# Patient Record
Sex: FEMALE | Race: WHITE
[De-identification: names, ages, dates, MRNs, and addresses within clinical notes are randomized per-mention and may not be internally consistent; named-entity substitution may affect disease eponyms.]

---

## 2018-01-20 ENCOUNTER — HOSPITAL ENCOUNTER (EMERGENCY)
Dept: HOSPITAL 75 - ER | Age: 48
Discharge: HOME | End: 2018-01-20
Payer: COMMERCIAL

## 2018-01-20 VITALS — SYSTOLIC BLOOD PRESSURE: 134 MMHG | DIASTOLIC BLOOD PRESSURE: 71 MMHG

## 2018-01-20 VITALS — HEIGHT: 62 IN | WEIGHT: 156 LBS | BODY MASS INDEX: 28.71 KG/M2

## 2018-01-20 DIAGNOSIS — Z90.710: ICD-10-CM

## 2018-01-20 DIAGNOSIS — D72.829: Primary | ICD-10-CM

## 2018-01-20 DIAGNOSIS — N39.0: ICD-10-CM

## 2018-01-20 DIAGNOSIS — Z90.89: ICD-10-CM

## 2018-01-20 DIAGNOSIS — Z90.49: ICD-10-CM

## 2018-01-20 DIAGNOSIS — J02.0: ICD-10-CM

## 2018-01-20 DIAGNOSIS — J45.909: ICD-10-CM

## 2018-01-20 LAB
ALBUMIN SERPL-MCNC: 4.1 GM/DL (ref 3.2–4.5)
ALP SERPL-CCNC: 161 U/L (ref 40–136)
ALT SERPL-CCNC: 45 U/L (ref 0–55)
APTT PPP: YELLOW S
BACTERIA #/AREA URNS HPF: (no result) /HPF
BASOPHILS # BLD AUTO: 0.2 10^3/UL (ref 0–0.1)
BASOPHILS NFR BLD AUTO: 1 % (ref 0–10)
BASOPHILS NFR BLD MANUAL: 0 %
BILIRUB SERPL-MCNC: 0.5 MG/DL (ref 0.1–1)
BILIRUB UR QL STRIP: NEGATIVE
BUN/CREAT SERPL: 14
CALCIUM SERPL-MCNC: 9.6 MG/DL (ref 8.5–10.1)
CHLORIDE SERPL-SCNC: 98 MMOL/L (ref 98–107)
CO2 SERPL-SCNC: 24 MMOL/L (ref 21–32)
CREAT SERPL-MCNC: 0.73 MG/DL (ref 0.6–1.3)
EOSINOPHIL # BLD AUTO: 0.3 10^3/UL (ref 0–0.3)
EOSINOPHIL NFR BLD AUTO: 2 % (ref 0–10)
EOSINOPHIL NFR BLD MANUAL: 3 %
ERYTHROCYTE [DISTWIDTH] IN BLOOD BY AUTOMATED COUNT: 13 % (ref 10–14.5)
FIBRINOGEN PPP-MCNC: (no result) MG/DL
GFR SERPLBLD BASED ON 1.73 SQ M-ARVRAT: > 60 ML/MIN
GLUCOSE SERPL-MCNC: 133 MG/DL (ref 70–105)
GLUCOSE UR STRIP-MCNC: NEGATIVE MG/DL
HCT VFR BLD CALC: 39 % (ref 35–52)
HGB BLD-MCNC: 13.4 G/DL (ref 11.5–16)
KETONES UR QL STRIP: (no result)
LEUKOCYTE ESTERASE UR QL STRIP: (no result)
LIPASE SERPL-CCNC: 34 U/L (ref 8–78)
LYMPHOCYTES # BLD AUTO: 2.4 X 10^3 (ref 1–4)
LYMPHOCYTES NFR BLD AUTO: 11 % (ref 12–44)
MANUAL DIFFERENTIAL PERFORMED BLD QL: YES
MCH RBC QN AUTO: 30 PG (ref 25–34)
MCHC RBC AUTO-ENTMCNC: 35 G/DL (ref 32–36)
MCV RBC AUTO: 86 FL (ref 80–99)
MONOCYTES # BLD AUTO: 1.1 X 10^3 (ref 0–1)
MONOCYTES NFR BLD AUTO: 5 % (ref 0–12)
MONOCYTES NFR BLD: 1 %
NEUTROPHILS # BLD AUTO: 17.5 X 10^3 (ref 1.8–7.8)
NEUTROPHILS NFR BLD AUTO: 81 % (ref 42–75)
NEUTS BAND NFR BLD MANUAL: 80 %
NEUTS BAND NFR BLD: 1 %
NITRITE UR QL STRIP: NEGATIVE
PH UR STRIP: 5 [PH] (ref 5–9)
PLATELET # BLD: 293 10^3/UL (ref 130–400)
PMV BLD AUTO: 10.1 FL (ref 7.4–10.4)
POTASSIUM SERPL-SCNC: 3.2 MMOL/L (ref 3.6–5)
PROT SERPL-MCNC: 8.6 GM/DL (ref 6.4–8.2)
PROT UR QL STRIP: (no result)
RBC # BLD AUTO: 4.46 10^6/UL (ref 4.35–5.85)
RBC #/AREA URNS HPF: (no result) /HPF
RBC MORPH BLD: NORMAL
SODIUM SERPL-SCNC: 140 MMOL/L (ref 135–145)
SP GR UR STRIP: 1.01 (ref 1.02–1.02)
UROBILINOGEN UR-MCNC: NORMAL MG/DL
VARIANT LYMPHS NFR BLD MANUAL: 15 %
WBC # BLD AUTO: 21.5 10^3/UL (ref 4.3–11)
WBC #/AREA URNS HPF: (no result) /HPF
YEAST #/AREA URNS HPF: (no result) /HPF

## 2018-01-20 PROCEDURE — 81000 URINALYSIS NONAUTO W/SCOPE: CPT

## 2018-01-20 PROCEDURE — 84703 CHORIONIC GONADOTROPIN ASSAY: CPT

## 2018-01-20 PROCEDURE — 87430 STREP A AG IA: CPT

## 2018-01-20 PROCEDURE — 83690 ASSAY OF LIPASE: CPT

## 2018-01-20 PROCEDURE — 87088 URINE BACTERIA CULTURE: CPT

## 2018-01-20 PROCEDURE — 96374 THER/PROPH/DIAG INJ IV PUSH: CPT

## 2018-01-20 PROCEDURE — 96375 TX/PRO/DX INJ NEW DRUG ADDON: CPT

## 2018-01-20 PROCEDURE — 80053 COMPREHEN METABOLIC PANEL: CPT

## 2018-01-20 PROCEDURE — 85027 COMPLETE CBC AUTOMATED: CPT

## 2018-01-20 PROCEDURE — 36415 COLL VENOUS BLD VENIPUNCTURE: CPT

## 2018-01-20 PROCEDURE — 71046 X-RAY EXAM CHEST 2 VIEWS: CPT

## 2018-01-20 PROCEDURE — 83605 ASSAY OF LACTIC ACID: CPT

## 2018-01-20 PROCEDURE — 85007 BL SMEAR W/DIFF WBC COUNT: CPT

## 2018-01-20 PROCEDURE — 87040 BLOOD CULTURE FOR BACTERIA: CPT

## 2018-01-20 PROCEDURE — 74177 CT ABD & PELVIS W/CONTRAST: CPT

## 2018-01-20 NOTE — DIAGNOSTIC IMAGING REPORT
PROCEDURE: CT abdomen and pelvis with contrast.



TECHNIQUE: Multiple contiguous axial images were obtained through

the abdomen and pelvis after administration of intravenous

contrast. 



INDICATION:  Left side abdominal pain



Lung bases are clear. Liver appears normal. Gallbladder is

present. Pancreas normal. Spleen is not enlarged. Kidneys and

adrenals appear normal. There is diverticulosis of the colon with

no evidence of diverticulitis. Small bowel is not dilated.

Appendix appears to be surgically absent. Uterus appears to be

surgically absent. There is no intraperitoneal free air or free

fluid.



IMPRESSION: Uncomplicated diverticulosis of the colon. No acute

abnormalities seen.



Dictated by: 



  Dictated on workstation # [id number]

## 2018-01-20 NOTE — DIAGNOSTIC IMAGING REPORT
INDICATION: Left-sided chest pain



PA and lateral chest



Heart size and pulmonary vascularity are normal. Lungs are clear.

There are no effusions or pneumothoraces.



IMPRESSION: Negative chest



Dictated by: 



  Dictated on workstation # [id number]

## 2018-01-20 NOTE — ED BACK PAIN
General


Chief Complaint:  Back Problems


Stated Complaint:  BACK PAIN


Nursing Triage Note:  


LOWER BACK PAIN, NAUSEA TODAY.


Nursing Sepsis Screen:  No Definite Risk


Source of Information:  Patient


Exam Limitations:  No Limitations





History of Present Illness


Date Seen by Provider:  2018


Time Seen by Provider:  19:26


Initial Comments


To ER with mid back pain, nausea today. No fever. Slight cough. She is on 

Tamiflu, hydrocodone/chlorpheniramine, Omnicef for testing positive for strep 

throat and influenza-like symptoms. She's been on these medicines since 18 

prescribed by [facility] urgent care.


Location:  Paraspinous Muscles


Timing/Duration:  1-2 Days


Severity:  Moderate





Allergies and Home Medications


Allergies


Coded Allergies:  


     No Known Drug Allergies (Unverified , 18)





Home Medications


Cefdinir 300 Mg Capsule, (Reported)


Clonidine HCl 0.1 Mg Tablet, (Reported)


Hydrocodone/Chlorphen P-Stirex 473 Ml Ne.er.12h, (Reported)


Oseltamivir Phosphate 75 Mg Capsule, (Reported)





Constitutional:  see HPI


EENTM:  see HPI


Respiratory:  see HPI, cough


Cardiovascular:  no symptoms reported


Genitourinary:  no symptoms reported


Musculoskeletal:  no symptoms reported


Skin:  no symptoms reported


Psychiatric/Neurological:  No Symptoms Reported





Past Medical-Social-Family Hx


Patient Social History


Alcohol Use:  Rarely Uses


Recreational Drug Use:  No


Smoking Status:  Never a Smoker


2nd Hand Smoke Exposure:  No


Recent Foreign Travel:  No


Contact w/Someone Who Travel:  No


Recent Infectious Disease Expo:  No


Recent Hopitalizations:  No





Immunizations Up To Date


Tetanus Booster (TDap):  Unknown





Seasonal Allergies


Seasonal Allergies:  Yes





Surgeries


History of Surgeries:  Yes


Surgeries:  Appendectomy, Hysterectomy, Tonsillectomy





Respiratory


History of Respiratory Disorde:  Yes


Respiratory Disorders:  Asthma





Cardiovascular


History of Cardiac Disorders:  No





Neurological


History of Neurological Disord:  No





Reproductive System


Pregnant:  No


GYN History:  Hysterectomy





Genitourinary


History of Genitourinary Disor:  No





Gastrointestinal


History of Gastrointestinal Di:  No





Musculoskeletal


History of Musculoskeletal Dis:  No





Endocrine


History of Endocrine Disorders:  No





HEENT


History of HEENT Disorders:  No





Cancer


History of Cancer:  No





Psychosocial


History of Psychiatric Problem:  No





Integumentary


History of Skin or Integumenta:  No





Blood Transfusions


History of Blood Disorders:  No





Physical Exam


Vital Signs





Vital Sign - Last 12Hours








 18





 19:15


 


Temp 98.3


 


Pulse 109


 


Resp 18


 


B/P (MAP) 133/82 (99)


 


Pulse Ox 95


 


O2 Delivery Room Air





Capillary Refill : Less Than 3 Seconds


General Appearance:  No Apparent Distress, WD/WN


HEENT:  PERRL/EOMI, TMs Normal, Other (mild cobblestoning and erythema of the 

oropharynx without exudate. Tonsils are absent.)


Neck:  Full Range of Motion, Normal Inspection, Lymphadenopathy (L), 

Lymphadenopathy (R)


Respiratory:  Normal Breath Sounds, No Accessory Muscle Use, No Respiratory 

Distress


Gastrointestinal:  Non Tender, Soft


Extremity:  Normal Capillary Refill, Normal Inspection


Neurologic/Psychiatric:  Alert, Oriented x3


Skin:  Normal Color, Warm/Dry





Progress/Results/Core Measures


Results/Orders


Lab Results





Laboratory Tests








Test


  18


19:20 18


19:25 18


20:10 18


20:38 Range/Units


 


 


White Blood Count


  21.5 H


  


  


  


  4.3-11.0


10^3/uL


 


Red Blood Count


  4.46 


  


  


  


  4.35-5.85


10^6/uL


 


Hemoglobin 13.4     11.5-16.0  G/DL


 


Hematocrit 39     35-52  %


 


Mean Corpuscular Volume 86     80-99  FL


 


Mean Corpuscular Hemoglobin 30     25-34  PG


 


Mean Corpuscular Hemoglobin


Concent 35 


  


  


  


  32-36  G/DL


 


 


Red Cell Distribution Width 13.0     10.0-14.5  %


 


Platelet Count


  293 


  


  


  


  130-400


10^3/uL


 


Mean Platelet Volume 10.1     7.4-10.4  FL


 


Neutrophils (%) (Auto) 81 H    42-75  %


 


Lymphocytes (%) (Auto) 11 L    12-44  %


 


Monocytes (%) (Auto) 5     0-12  %


 


Eosinophils (%) (Auto) 2     0-10  %


 


Basophils (%) (Auto) 1     0-10  %


 


Neutrophils # (Auto) 17.5 H    1.8-7.8  X 10^3


 


Lymphocytes # (Auto) 2.4     1.0-4.0  X 10^3


 


Monocytes # (Auto) 1.1 H    0.0-1.0  X 10^3


 


Eosinophils # (Auto)


  0.3 


  


  


  


  0.0-0.3


10^3/uL


 


Basophils # (Auto)


  0.2 H


  


  


  


  0.0-0.1


10^3/uL


 


Neutrophils % (Manual) 80      %


 


Lymphocytes % (Manual) 15      %


 


Monocytes % (Manual) 1      %


 


Eosinophils % (Manual) 3      %


 


Basophils % (Manual) 0      %


 


Band Neutrophils 1      %


 


Blood Morphology Comment NORMAL      


 


Sodium Level 140     135-145  MMOL/L


 


Potassium Level 3.2 L    3.6-5.0  MMOL/L


 


Chloride Level 98       MMOL/L


 


Carbon Dioxide Level 24     21-32  MMOL/L


 


Anion Gap 18 H    5-14  MMOL/L


 


Blood Urea Nitrogen 10     7-18  MG/DL


 


Creatinine


  0.73 


  


  


  


  0.60-1.30


MG/DL


 


Estimat Glomerular Filtration


Rate > 60 


  


  


  


   


 


 


BUN/Creatinine Ratio 14      


 


Glucose Level 133 H      MG/DL


 


Calcium Level 9.6     8.5-10.1  MG/DL


 


Total Bilirubin 0.5     0.1-1.0  MG/DL


 


Aspartate Amino Transf


(AST/SGOT) 20 


  


  


  


  5-34  U/L


 


 


Alanine Aminotransferase


(ALT/SGPT) 45 


  


  


  


  0-55  U/L


 


 


Alkaline Phosphatase 161 H      U/L


 


Total Protein 8.6 H    6.4-8.2  GM/DL


 


Albumin 4.1     3.2-4.5  GM/DL


 


Lipase 34     8-78  U/L


 


Urine Color  YELLOW     


 


Urine Clarity


  


  SLIGHTLY


CLOUDY 


  


   


 


 


Urine pH  5    5-9  


 


Urine Specific Gravity  1.015 L   1.016-1.022  


 


Urine Protein  2+ H   NEGATIVE  


 


Urine Glucose (UA)  NEGATIVE    NEGATIVE  


 


Urine Ketones  3+ H   NEGATIVE  


 


Urine Nitrite  NEGATIVE    NEGATIVE  


 


Urine Bilirubin  NEGATIVE    NEGATIVE  


 


Urine Urobilinogen  NORMAL    NORMAL  MG/DL


 


Urine Leukocyte Esterase  2+ H   NEGATIVE  


 


Urine RBC (Auto)  3+ H   NEGATIVE  


 


Urine RBC  2-5 H    /HPF


 


Urine WBC  5-10 H    /HPF


 


Urine Squamous Epithelial


Cells 


  10-25 H


  


  


   /HPF


 


 


Urine Crystals  NONE     /LPF


 


Urine Bacteria  TRACE     /HPF


 


Urine Casts  NONE     /LPF


 


Urine Mucus  MODERATE H    /LPF


 


Urine Yeast  FEW H    /HPF


 


Urine Culture Indicated  YES     


 


Urine Pregnancy Test  NEGATIVE    NEGATIVE  


 


Lactic Acid Level


  


  


  1.08 


  


  0.50-2.00


MMOL/L


 


Group A Streptococcus Screen    NEGATIVE  NEGATIVE  








My Orders





Orders - JAKE HERBERT


Cbc With Automated Diff (18 19:25)


Comprehensive Metabolic Panel (18 19:25)


Ua Culture If Indicated (18 19:25)


Ketorolac Injection (Toradol Injection) (18 19:30)


Lactated Ringers (Lr 1000 Ml Iv Solution (18 19:30)


Lipase (18 19:25)


Chest Pa/Lat (2 View) (18 19:25)


Ondansetron Injection (Zofran Injectio (18 19:30)


Saline Lock/Iv-Start (18 19:34)


Manual Differential (18 19:20)


Urine Culture (18 19:25)


Ct Abdomen/Pelvis W (18 19:48)


Hcg,Qualitative Urine (18 19:49)


Iohexol Injection (Omnipaque 350 Mg/Ml 1 (18 20:00)


Ns (Ivpb) (Sodium Chloride 0.9% Ivpb Bag (18 20:00)


Blood Culture (18 19:52)


Lactic Acid Analyzer (18 19:52)


Rapid Strep A Screen (18 20:08)


Ceftriaxone Injection (Rocephin Injectio (18 21:00)


Potassium Chloride (Tablet) (Klor Con Ta (18 21:15)


Fluconazole Tablet (Diflucan Tablet) (18 21:15)


Potassium Chloride (Tablet) (K Dur Table (18 21:03)


Fluconazole Tablet (Ed Only) (Diflucan T (18 21:03)





Medications Given in ED





Vital Signs/I&O





Vital Sign - Last 12Hours








 18





 19:15 19:30 21:20


 


Temp 98.3 98.3 98.3


 


Pulse 109  72


 


Resp 18  16


 


B/P (MAP) 133/82 (99)  


 


Pulse Ox 95  98


 


O2 Delivery Room Air  Room Air














Intake and Output 


 


 18





 00:00


 


Intake Total 1050 ml


 


Balance 1050 ml














Blood Pressure Mean:  99











Diagnostic Imaging





   Diagonstic Imaging:  Xray


   Plain Films/CT/US/NM/MRI:  chest


Comments


NAME:   MIO CAPPS WIN


MED REC#:   [id number]


ACCOUNT#:   [id number]


PT STATUS:   REG ER


:   1970


PHYSICIAN:   JAKE HERBERT


ADMIT DATE:   18/ER


 ***Draft***


Date of Exam:18





CHEST PA/LAT (2 VIEW)








INDICATION: Left-sided chest pain





PA and lateral chest





Heart size and pulmonary vascularity are normal. Lungs are clear.


There are no effusions or pneumothoraces.





IMPRESSION: Negative chest





  Dictated on workstation # [id number]








Dict:   18


Trans:   18


ARTURO [id number]





Interpreted by:     VANESSA MACKAY MD


Electronically signed by:  


NAME:   MIO CAPPS


MED REC#:   [id number]


ACCOUNT#:   [id number]


PT STATUS:   REG ER


:   1970


PHYSICIAN:   JAKE HERBERT APRN


ADMIT DATE:   18/ER


 ***Signed***


Date of Exam:18





CT ABDOMEN/PELVIS W








PROCEDURE: CT abdomen and pelvis with contrast.





TECHNIQUE: Multiple contiguous axial images were obtained through


the abdomen and pelvis after administration of intravenous


contrast. 





INDICATION:  Left side abdominal pain





Lung bases are clear. Liver appears normal. Gallbladder is


present. Pancreas normal. Spleen is not enlarged. Kidneys and


adrenals appear normal. There is diverticulosis of the colon with


no evidence of diverticulitis. Small bowel is not dilated.


Appendix appears to be surgically absent. Uterus appears to be


surgically absent. There is no intraperitoneal free air or free


fluid.





IMPRESSION: Uncomplicated diverticulosis of the colon. No acute


abnormalities seen.





Dictated by: 





  Dictated on workstation # [id number]








Dict:   18


Trans:   18


ARTURO [id number]





Interpreted by:     VANESSA MACKAY MD


Electronically signed by: VANESSA MACKAY MD 18





Departure


Communication (Admissions)


Progress Notes


-patient states that her back is feeling much better at this time. Her 

heart rate is down to 72, blood pressure 130s over 80s. Afebrile. She does have 

leukocytosis without obvious source. I will give Rocephin 1 g for the bladder 

infection as well as Diflucan and discharged home with return precautions.





Impression


Impression:  


 Primary Impression:  


 Leukocytosis


 Additional Impressions:  


 Urinary tract infection


 recent streptococcal pharyngitis


Disposition:   HOME, SELF-CARE


Condition:  Stable





Departure-Patient Inst.


Decision time for Depature:  21:05


Referrals:  


[facility] (PCP/Family)


Primary Care Physician


Patient Instructions:  Low Back Pain  (DC), Urinary Tract Infection, Adult (DC)





Add. Discharge Instructions:  


1. Follow-up with your doctor next week for recheck. Please follow up within 

about 3-4 days. Your white blood cells were high tonight so we should recheck 

this next week.


2. Return to ER for any pain, fevers, any other concerns.. Continue taking your 

antibiotics. All discharge instructions reviewed with patient and/or family. 

Voiced understanding.





Images


Torso/Trunk











1 - Tenderness














JAKE HERBERT 2018 19:28

## 2019-08-22 ENCOUNTER — HOSPITAL ENCOUNTER (OUTPATIENT)
Dept: HOSPITAL 75 - PREOP | Age: 49
LOS: 1 days | Discharge: HOME | End: 2019-08-23
Attending: SURGERY
Payer: COMMERCIAL

## 2019-08-22 VITALS — HEIGHT: 62 IN | BODY MASS INDEX: 28.71 KG/M2 | WEIGHT: 156 LBS

## 2019-08-22 DIAGNOSIS — Z01.818: Primary | ICD-10-CM

## 2019-08-27 ENCOUNTER — HOSPITAL ENCOUNTER (OUTPATIENT)
Dept: HOSPITAL 75 - ENDO | Age: 49
Discharge: HOME | End: 2019-08-27
Attending: SURGERY
Payer: COMMERCIAL

## 2019-08-27 VITALS — DIASTOLIC BLOOD PRESSURE: 59 MMHG | SYSTOLIC BLOOD PRESSURE: 110 MMHG

## 2019-08-27 VITALS — DIASTOLIC BLOOD PRESSURE: 55 MMHG | SYSTOLIC BLOOD PRESSURE: 110 MMHG

## 2019-08-27 VITALS — DIASTOLIC BLOOD PRESSURE: 55 MMHG | SYSTOLIC BLOOD PRESSURE: 105 MMHG

## 2019-08-27 VITALS — WEIGHT: 156 LBS | HEIGHT: 62 IN | BODY MASS INDEX: 28.71 KG/M2

## 2019-08-27 VITALS — DIASTOLIC BLOOD PRESSURE: 87 MMHG | SYSTOLIC BLOOD PRESSURE: 134 MMHG

## 2019-08-27 VITALS — DIASTOLIC BLOOD PRESSURE: 72 MMHG | SYSTOLIC BLOOD PRESSURE: 130 MMHG

## 2019-08-27 DIAGNOSIS — K51.40: ICD-10-CM

## 2019-08-27 DIAGNOSIS — K21.9: ICD-10-CM

## 2019-08-27 DIAGNOSIS — Z88.0: ICD-10-CM

## 2019-08-27 DIAGNOSIS — Z90.710: ICD-10-CM

## 2019-08-27 DIAGNOSIS — F41.9: ICD-10-CM

## 2019-08-27 DIAGNOSIS — Z82.49: ICD-10-CM

## 2019-08-27 DIAGNOSIS — K62.1: Primary | ICD-10-CM

## 2019-08-27 DIAGNOSIS — Z90.89: ICD-10-CM

## 2019-08-27 DIAGNOSIS — K92.1: ICD-10-CM

## 2019-08-27 DIAGNOSIS — J45.909: ICD-10-CM

## 2019-08-27 DIAGNOSIS — Z79.899: ICD-10-CM

## 2019-08-27 DIAGNOSIS — K57.30: ICD-10-CM

## 2019-08-27 PROCEDURE — 88305 TISSUE EXAM BY PATHOLOGIST: CPT

## 2019-08-27 NOTE — PROGRESS NOTE-POST OPERATIVE
Post-Operative Progess Note


Surgeon (s)/Assistant (s)


Surgeon


KIM WAYNE DO


Assistant:  na





Pre-Operative Diagnosis


blood in stools





Post-Operative Diagnosis





minimal diverticulosis, rectosigmoid polyp





Procedure & Operative Findings


Date of Procedure


8/27/19


Procedure Performed/Findings


colonoscopy c snare polypectomy and eda inking


Anesthesia Type


per crna





Estimated Blood Loss


Estimated blood loss (mL):  min





Specimens/Packing


Specimens Removed


rectosigmoid polyp











KIM WAYNE DO              Aug 27, 2019 08:28

## 2019-08-27 NOTE — ANESTHESIA-GENERAL POST-OP
MAC


Patient Condition


Mental Status/LOC:  Same as Preop


Cardiovascular:  Satisfactory


Nausea/Vomiting:  Absent


Respiratory:  Satisfactory


Pain:  Controlled


Complications:  Absent





Post Op Complications


Complications


None





Follow Up Care/Instructions


Patient Instructions


None needed.





Anesthesiology Discharge Order


Discharge Order


Patient was seen after the procedure and she was doing well, no complaints, 

stable vital signs, no apparent adverse anesthesia problems.











RONALD AGUAYO DO         Aug 27, 2019 15:27

## 2019-08-27 NOTE — DISCHARGE INST-SIMPLE/STANDARD
Discharge Inst-Standard


Patient Instructions/Follow Up


Plan of Care/Instructions/FU:  


2 weeks ai


Activity as Tolerated:  Yes


Discharge Diet:  Regular Diet (high fiber)











KIM WAYNE DO              Aug 27, 2019 08:30

## 2019-08-27 NOTE — PROGRESS NOTE-PRE OPERATIVE
Pre-Operative Progress Note


H&P Reviewed


The H&P was reviewed, patient examined and no changes noted.


Date Seen by Provider:  Aug 27, 2019


Time Seen by Provider:  07:44


Date H&P Reviewed:  Aug 27, 2019


Time H&P Reviewed:  07:45


Pre-Operative Diagnosis:  blood in stools











KIM WAYNE DO              Aug 27, 2019 07:45

## 2019-08-27 NOTE — OPERATIVE REPORT
DATE OF SERVICE:  08/27/2019



PREOPERATIVE DIAGNOSIS:

Blood in stool.



POSTOPERATIVE DIAGNOSES:

1.  Minimal diverticulosis.

2.  Rectosigmoid polyp.



PROCEDURE:

Colonoscopy with snare polypectomy with Celina inking.



SURGEON:

Kim Ray DO



ANESTHESIA:

Per CRNA.



ESTIMATED BLOOD LOSS:

None.



COMPLICATIONS:

None.



INDICATIONS:

The patient is a 48-year-old female with blood in stools.  She understands risks

and benefits of procedure and wished to proceed with procedure.  Consent was

signed in the chart.



DESCRIPTION OF PROCEDURE:

The patient was taken to the endoscopy suite, placed in left lateral recumbent

position.  Timeout was performed.  Digital rectal exam was performed.  There

were no palpable polyps, masses or ulcerations.  Scope was inserted in the

rectum and advanced all the way to the cecum with minimal difficulty.  Prep was

adequate.  Scope was then slowly retracted back.  There were no polyps, masses

or ulcerations of the cecum, ascending, transverse and descending colon.  In the

sigmoid colon, minimal amount of diverticulosis was present.  No polyps, masses

or ulcerations except over the rectosigmoid junction, a large pedunculated polyp

was present.  Snare polypectomy was performed.  This had to be suctioned and

withdrawn.  Scope was inserted back into the area of snare polypectomy.  Just

distal to this area, 2 mL of Celina ink was injected.  The scope was then

continuously retracted back noting no other polyps, masses or ulcerations.  Once

in the rectum, scope was retroflexed noting no other pathology.  Scope was

returned to its normal position, slowly withdrawn until completely removed.  The

patient tolerated the procedure well without any complications.  She was taken

to recovery room in stable condition.



RECOMMENDATION:

The patient will need repeat colonoscopy in 6 months to reevaluate this area. 

Any issues before that, will be seen at that time.  The patient was recommended

high fiber diet.





Job ID: [id number]

DocumentID: [id number]

Dictated Date:  08/27/2019 08:33:16

Transcription Date: 08/27/2019 09:36:07

Dictated By: KIM RAY DO

## 2019-12-09 ENCOUNTER — HOSPITAL ENCOUNTER (OUTPATIENT)
Dept: HOSPITAL 75 - RAD | Age: 49
End: 2019-12-09
Attending: ORTHOPAEDIC SURGERY
Payer: COMMERCIAL
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
## 2019-12-09 DIAGNOSIS — M65.872: ICD-10-CM

## 2019-12-09 DIAGNOSIS — S86.012D: Primary | ICD-10-CM

## 2019-12-09 DIAGNOSIS — M67.472: ICD-10-CM

## 2019-12-09 PROCEDURE — 73721 MRI JNT OF LWR EXTRE W/O DYE: CPT

## 2019-12-09 NOTE — DIAGNOSTIC IMAGING REPORT
PROCEDURE: MRI left joint lower extremity without contrast.



TECHNIQUE: Multiplanar, multisequence non contrast-enhanced MRI

of the left lower extremity was accomplished.



INDICATION: Left ankle pain after injury. Pain is most pronounced

at the posterolateral ankle.



COMPARISON: None.



FINDINGS:



No acute fracture is seen in the left ankle. Alignment appears

normal. No joint effusion is seen.



The anterior and posterior syndesmotic ligaments are intact. The

anterior talofibular ligament and posterior talofibular ligament

are intact. The calcaneofibular ligament is intact. The deep

fibers of the deltoid ligament demonstrate mild irregularity with

small subcortical fluid signal at the medial talus. The spring

ligament appears intact. There is thickening and increased signal

at the origin of the central plantar fascia. There is partial

tearing present. The sinus tarsi demonstrates normal fatty

signal.



The Achilles tendon is intact. There is mildly increased fluid in

the peroneal tendon sheath without a tear seen. The flexor

tendons appear intact. The extensor tendons are intact. The soft

tissues about the ankle demonstrate no focal muscular atrophy.

The tarsal tunnel is unremarkable. There is a small fluid

collection at the lateral aspect of the posterior subtalar joint

which measures 7 x 7 x 5 mm in size and likely represents a

ganglion cyst. There is a fatty prominence overlying the

anterolateral ankle measuring approximately 3.5 x 4.1 x 1.4 cm in

size, may represent a subcutaneous lipoma.



IMPRESSION:

1. Findings concerning for grade 2 sprain with underlying

avulsive injury at the deep fibers of the deltoid ligament,

however this does not correlate with the indicated site of pain.

2. Mild peroneal tenosynovitis. No tear is seen.

3. Small ganglion cyst at the lateral aspect of the posterior

subtalar joint.

4. Fatty prominence at the anterolateral aspect of the left

ankle, may represent a subcutaneous lipoma.

5. Findings concerning for plantar fasciitis with low-grade

partial tearing of the fascia.



Dictated by: 



  Dictated on workstation # [id number]

## 2020-05-08 ENCOUNTER — HOSPITAL ENCOUNTER (OUTPATIENT)
Dept: HOSPITAL 75 - PREOP | Age: 50
Discharge: HOME | End: 2020-05-08
Attending: SURGERY
Payer: COMMERCIAL

## 2020-05-08 VITALS — HEIGHT: 62.99 IN | BODY MASS INDEX: 28.05 KG/M2 | WEIGHT: 158.29 LBS

## 2020-05-08 DIAGNOSIS — Z01.818: Primary | ICD-10-CM

## 2020-05-08 DIAGNOSIS — Z11.59: ICD-10-CM

## 2020-05-08 DIAGNOSIS — Z86.010: ICD-10-CM

## 2020-05-08 PROCEDURE — 87635 SARS-COV-2 COVID-19 AMP PRB: CPT

## 2020-05-12 ENCOUNTER — HOSPITAL ENCOUNTER (OUTPATIENT)
Dept: HOSPITAL 75 - ENDO | Age: 50
Discharge: HOME | End: 2020-05-12
Attending: SURGERY
Payer: COMMERCIAL

## 2020-05-12 VITALS — DIASTOLIC BLOOD PRESSURE: 77 MMHG | SYSTOLIC BLOOD PRESSURE: 132 MMHG

## 2020-05-12 VITALS — DIASTOLIC BLOOD PRESSURE: 65 MMHG | SYSTOLIC BLOOD PRESSURE: 109 MMHG

## 2020-05-12 VITALS — SYSTOLIC BLOOD PRESSURE: 112 MMHG | DIASTOLIC BLOOD PRESSURE: 65 MMHG

## 2020-05-12 VITALS — DIASTOLIC BLOOD PRESSURE: 56 MMHG | SYSTOLIC BLOOD PRESSURE: 108 MMHG

## 2020-05-12 VITALS — DIASTOLIC BLOOD PRESSURE: 54 MMHG | SYSTOLIC BLOOD PRESSURE: 103 MMHG

## 2020-05-12 VITALS — HEIGHT: 62.99 IN | BODY MASS INDEX: 28.05 KG/M2 | WEIGHT: 158.29 LBS

## 2020-05-12 VITALS — SYSTOLIC BLOOD PRESSURE: 109 MMHG | DIASTOLIC BLOOD PRESSURE: 65 MMHG

## 2020-05-12 VITALS — SYSTOLIC BLOOD PRESSURE: 110 MMHG | DIASTOLIC BLOOD PRESSURE: 56 MMHG

## 2020-05-12 DIAGNOSIS — Z87.891: ICD-10-CM

## 2020-05-12 DIAGNOSIS — Z90.710: ICD-10-CM

## 2020-05-12 DIAGNOSIS — Z88.0: ICD-10-CM

## 2020-05-12 DIAGNOSIS — J45.909: ICD-10-CM

## 2020-05-12 DIAGNOSIS — Z86.010: ICD-10-CM

## 2020-05-12 DIAGNOSIS — K21.9: ICD-10-CM

## 2020-05-12 DIAGNOSIS — Z79.899: ICD-10-CM

## 2020-05-12 DIAGNOSIS — Z82.49: ICD-10-CM

## 2020-05-12 DIAGNOSIS — Z12.11: Primary | ICD-10-CM

## 2020-05-12 NOTE — PROGRESS NOTE-POST OPERATIVE
Post-Operative Progess Note


Surgeon (s)/Assistant (s)


Surgeon


KIM WAYNE DO


Assistant:  na





Pre-Operative Diagnosis


history of colon polyps





Post-Operative Diagnosis





normal colon





Procedure & Operative Findings


Date of Procedure


5/12/20


Procedure Performed/Findings


colonoscopy


Anesthesia Type


per crna





Estimated Blood Loss


Estimated blood loss (mL):  min





Specimens/Packing


Specimens Removed


KIM Demarco DO              May 12, 2020 09:08

## 2020-05-12 NOTE — XMS REPORT
CCD document using C-CDA

                             Created on: 2019



Rosibel Cole

External Reference #: [id number]

: 1970

Sex: Female



Demographics





                          Address                   [address]

[place]  [address]

 

                          Home Phone                [phone]

 

                          Preferred Language        English

 

                          Marital Status            Unknown

 

                          Anabaptism Affiliation     Unknown

 

                          Race                      White

 

                          Ethnic Group              Not  or 





Author





                          Author                    Rosibel Crump

 

                          Organization              [facility]

 

                          Address                   [address] [place]  [address]



 

                          Phone                     [phone]







Care Team Providers





                    Care Team Member Name Role                Phone

 

                          PP                        Unavailable

 

                          CCM                       Unavailable



                                            



Summary Purpose

          Interface Exchange                                                    
               



Insurance Providers

                      



                    Payer name                   Policy type / Coverage type    

               

Covered party ID                   Effective Begin Date                   

Effective End Date                

 

                          [facility]

mercial Insurance         

                    [id number]                   Unknown                   Unk

nown       

        



                                                                              



Family history

                      



Mother            



                          Diagnosis                   Age At Onset              

  

 

                          Diabetes mellitus Type 2                   Unknown    

            

 

                          Alcoholism                   Unknown                

 

                          Depression                   Unknown                



            



Father            



                          Diagnosis                   Age At Onset              

  

 

                          Diabetes mellitus Type 2                   Unknown    

            



                                                                                
       



Social History

                      



                    Social History Element                   Codes              

     Description    

                                        Effective Dates                

 

                    Marital status                   Unknown                   M

arried

Ha                                   08/15/2019                

 

                    Number of children                   Unknown                

   2                

                                        08/15/2019                

 

                    Employment                   Unknown                   [employer]

ntly employed

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                

 

                    Tobacco history                   SNOMED CT: [id number]

                   

Current some days smoker                   08/15/2019                

 

                    Alcohol history                   SNOMED CT: [id number]      

             Never 

drinks alcohol                          08/15/2019                



                                                                                
                                               



Allergies, Adverse Reactions, Alerts

                      



                Substance                   Reaction                   Codes    

               

Entered Date                   Inactivated Date                   Status        

       

 

                                              Penicillin                        

                          

                    Unknown                   08/15/2019                   No In

active Date   

                                        Active                



                                                                              



Past Medical History

                      



                    Illness                   Codes                   Condition 

Status              

                          Onset Date                   Resolved Date            

    

 

                                                            Pain in left ankle a

nd joints of left foot                  

                                        ICD-9: 719.47

ICD-10: M25.572                   Active                    2019          

 

                                        Unknown                

 

                                              Pain in left knee                 

                    ICD-9:

719.46

ICD-10: M25.562                   Active                    2019          

 

                                        Unknown                

 

                                                            Encounter for genera

l adult medical examination without 

abnormal findings                                     ICD-9: V70.0

ICD-10: Z00.00                   Active                    08/15/2019           

 

                                        Unknown                

 

                                                            Generalized anxiety 

disorder                                

                                        ICD-9: 300.00

ICD-10: F41.1                   Active                    08/15/2019            

 

                                        Unknown                

 

                                                            Menopausal and femal

e climacteric states                    

                                        ICD-9: 627.2

ICD-10: N95.1                   Active                    08/15/2019            

 

                                        Unknown                



                                                                                
                                               



Problems

                      



                    Condition                   Codes                   Effectiv

e Dates             

                                        Condition Status                

 

                                                            Pain in left ankle a

nd joints of left foot                  

                                        ICD-9: 719.47

ICD-10: M25.572                   2019                   Active           

    

 

                                              Pain in left knee                 

                    ICD-9:

719.46

ICD-10: M25.562                   2019                   Active           

    

 

                                                            Encounter for genera

l adult medical examination without 

abnormal findings                                     ICD-9: V70.0

ICD-10: Z00.00                   08/15/2019                   Active            

   

 

                                                            Generalized anxiety 

disorder                                

                                        ICD-9: 300.00

ICD-10: F41.1                   08/15/2019                   Active             

  

 

                                                            Menopausal and femal

e climacteric states                    

                                        ICD-9: 627.2

ICD-10: N95.1                   08/15/2019                   Active             

  



                                                                                
                                               



Medications

                      



                    Medication                   Codes                   Instruc

tions               

                    Start Date                   Stop Date                   Sta

tus              

                                        Fill Instructions                

 

                                                            gabapentin 100 mg ca

psule                                   

                    RxNorm: 154035                   1 Capsule(s) PO QHS        

           

2019                   10/11/2019                   Active                

                                                        

 

                                              naproxen 500 mg tablet            

                         

RxNorm: 237886                   1 Tablet(s) PO BID                   2019                   Active                         

  

       

 

                                                            escitalopram 5 mg ta

blet                                    

                    RxNorm: 373755                   1 Tablet(s) PO QHS         

          08/15/2019

                    02/10/2020                   Active                         

  

       

 

                                                            ranitidine 150 mg ca

psule                                   

                    RxNorm: 444563                   1 Capsule(s) PO BID        

           No Start

Date                                       Active                               

   

 

                                              eszopiclone 1 mg tablet           

                          

RxNorm: 978126                   Tablet(s) PO as needed                   No 

Start Date                                       Active                         

         

 

                                              acyclovir 400 mg tablet           

                          

RxNorm: 732616                          1 Tablet(s) PO PRN fever blisters       

       

                    No Start Date                                       Active  

                

                                                        

 

                                                            clonidine HCl 0.1 mg

 tablet                                 

                    RxNorm: 319029                   Tablet(s) PO as needed     

              No 

Start Date                   2019                   Inactive              

                                                        



                                                                                
                                                         



Medication Administered

          No Medication Administered data                                       
                            



Immunizations

          No Immunization data                                                  
       



Assessments

                      



                    Condition                   Codes                   Effectiv

e Dates             

  

 

                          Pain in left knee                   ICD-10: M25.562

ICD-9: 719.46                           2019                

 

                          Pain in left ankle and joints of left foot            

       ICD-10: M25.572

ICD-9: 719.47                           2019                

 

                          Generalized anxiety disorder                   ICD-10:

 F41.1

ICD-9: 300.00                           08/15/2019                

 

                                        Encounter for general adult medical exam

ination without abnormal findings       

                                        ICD-10: Z00.00

ICD-9: V70.0                            08/15/2019                

 

                          Menopausal and female climacteric states              

     ICD-10: N95.1

ICD-9: 627.2                            08/15/2019                



                                                                    



Reason For Visit

                      



                    Reason For Visit                   Effective Dates          

         Notes      

         

 

                    lower leg pain                   2019                 

                  

 

                    lower leg pain                   2019                 

                  

 

                    anxiety                   08/15/2019                        

           



                                                                              



Results

          No Results data                                                       
  



Review of Systems

                      



                    System                   Result                   Effective 

Dates               



 

                    Constitutional                   No recent illness          

         2019 

              

 

                    Constitutional                   No chills                  

 2019         

      

 

                    Constitutional                   No fever                   

2019          

     

 

                    Eyes                   No eye erythema                   2019             

  

 

                    Ears/Nose/Throat/Neck                   No nasal discharge  

                 

2019                

 

                    Cardiovascular                   No chest pain/pressure     

              

2019                

 

                    Cardiovascular                   No dyspnea                 

  2019        

       

 

                    Respiratory                   No cough                   2019             

  

 

                    Respiratory                   No dyspnea                   0

2019           

    

 

                    Musculoskeletal                   joint complaint           

        2019  

             

 

                    Neurologic                   No alteration of consciousness 

                  

2019                

 

                    Neurologic                   No mental status change        

           

2019                

 

                    Constitutional                   No recent illness          

         2019 

              

 

                    Constitutional                   No chills                  

 2019         

      

 

                    Constitutional                   No fever                   

2019          

     

 

                    Eyes                   No eye erythema                   2019             

  

 

                    Ears/Nose/Throat/Neck                   No nasal discharge  

                 

2019                

 

                    Cardiovascular                   No chest pain/pressure     

              

2019                

 

                    Cardiovascular                   No dyspnea                 

  2019        

       

 

                    Respiratory                   No cough                   2019             

  

 

                    Respiratory                   No dyspnea                   0

2019           

    

 

                    Musculoskeletal                   joint complaint           

        2019  

             

 

                    Neurologic                   No alteration of consciousness 

                  

2019                

 

                    Neurologic                   No mental status change        

           

2019                

 

                    Constitutional                   No recent illness          

         08/15/2019 

              

 

                    Constitutional                   No chills                  

 08/15/2019         

      

 

                    Constitutional                   No fatigue                 

  08/15/2019        

       

 

                    Constitutional                   No fever                   

08/15/2019          

     

 

                    Constitutional                   No insomnia                

   08/15/2019       

        

 

                    Constitutional                   No malaise                 

  08/15/2019        

       

 

                    Eyes                   No vision change                   08

/15/2019            

   

 

                    Ears/Nose/Throat/Neck                   No dental pain      

             

08/15/2019                

 

                    Ears/Nose/Throat/Neck                   No dizziness        

           

08/15/2019                

 

                    Ears/Nose/Throat/Neck                   No dysphagia        

           

08/15/2019                

 

                    Ears/Nose/Throat/Neck                   No headache         

          08/15/2019

               

 

                    Ears/Nose/Throat/Neck                   No hearing loss     

              

08/15/2019                

 

                    Ears/Nose/Throat/Neck                   No nasal allergies  

                 

08/15/2019                

 

                    Ears/Nose/Throat/Neck                   No sore throat      

             

08/15/2019                

 

                    Ears/Nose/Throat/Neck                   No postnasal drip   

                

08/15/2019                

 

                    Ears/Nose/Throat/Neck                   No sinus congestion 

                  

08/15/2019                

 

                    Cardiovascular                   No chest pain/pressure     

              

08/15/2019                

 

                    Cardiovascular                   No dyspnea                 

  08/15/2019        

       

 

                    Cardiovascular                   No edema                   

08/15/2019          

     

 

                    Cardiovascular                   No exercise intolerance    

               

08/15/2019                

 

                    Cardiovascular                   No fatigue                 

  08/15/2019        

       

 

                    Cardiovascular                   No near-syncope/dizziness  

                 

08/15/2019                

 

                    Respiratory                   No chest tightness            

       08/15/2019   

            

 

                    Respiratory                   No cough                   08/

15/2019             

  

 

                    Respiratory                   No dyspnea                   0

8/15/2019           

    

 

                    Respiratory                   No pedal edema                

   08/15/2019       

        

 

                    Gastrointestinal                   No abdominal pain        

           

08/15/2019                

 

                    Gastrointestinal                   No constipation          

         08/15/2019 

              

 

                    Gastrointestinal                   No diarrhea              

     08/15/2019     

          

 

                    Gastrointestinal                   No gastroesophageal reflu

x                   

08/15/2019                

 

                    Gastrointestinal                   No nausea                

   08/15/2019       

        

 

                    Gastrointestinal                   No vomiting              

     08/15/2019     

          

 

                    Genitourinary/Nephrology                   No dysuria       

            

08/15/2019                

 

                    Genitourinary/Nephrology                   No nocturia      

             

08/15/2019                

 

                          Genitourinary/Nephrology                   No urinary 

incontinence              

                                        08/15/2019                

 

                    Musculoskeletal                   No stiffness              

     08/15/2019     

          

 

                    Musculoskeletal                   No swelling               

    08/15/2019      

         

 

                    Musculoskeletal                   No muscle weakness        

           

08/15/2019                

 

                    Musculoskeletal                   No myalgias               

    08/15/2019      

         

 

                    Dermatologic                   No rash                   08/

15/2019             

  

 

                    Dermatologic                   No sores                   08

/15/2019            

   

 

                    Neurologic                   No dizziness                   

08/15/2019          

     

 

                    Neurologic                   No headache                   0

8/15/2019           

    

 

                    Neurologic                   No neck pain                   

08/15/2019          

     

 

                    Neurologic                   No syncope                   08

/15/2019            

   

 

                    Psychiatric                   anxiety                                 

 

 

                    Psychiatric                   No depression                 

  08/15/2019        

       



                                                                    



Physical Exam

                      



                    Exam Name                   System Name                   It

em Name             

                    Status                   Result                   Effective 

Dates          

                                        Notes                

 

                    Full Exam - Orthopedics                   Constitutional    

                

general appearance                   Overall:                   well nourished  

                          2019                   None                

 

                    Full Exam - Orthopedics                   Constitutional    

                

general appearance                   Overall:                   well developed  

                          2019                   None                

 

                    Full Exam - Orthopedics                   Constitutional    

                

general appearance                   Overall:                   in no acute 

distress                   2019                   None                

 

                    Full Exam - Orthopedics                   Eyes              

     

conjunctiva/eyelids                   Overall:                   conjunctiva 

clear                     2019                   None                

 

                    Full Exam - Orthopedics                   Eyes              

     

conjunctiva/eyelids                   Overall:                   eyelids normal 

                          2019                   None                

 

                    Full Exam - Orthopedics                   Ears/Nose/Throat  

                 

lips/teeth/gingiva                   Overall:                   benign lips     

                          2019                   None                

 

                    Full Exam - Orthopedics                   Ears/Nose/Throat  

                 

oral cavity/pharynx/larynx                    Overall:                   oral 

mucosa clear                   2019                   None                

 

                    Full Exam - Orthopedics                   Respiratory       

            

respiratory effort/rhythm                   Overall:                   no 

retractions                   2019                   None                

 

                    Full Exam - Orthopedics                   Respiratory       

            

respiratory effort/rhythm                   Overall:                   normal 

rate                      2019                   None                

 

                    Full Exam - Orthopedics                   MS: head/neck     

              insp &

palp - H/N                   Overall:                   head atraumatic         

                          2019                   None                

 

                          Full Exam - Orthopedics                   MS: left low

er extremity              

                    insp & palp - LLE                   Ankle:                  

 joint swelling 

                          2019                   None                

 

                          Full Exam - Orthopedics                   MS: left low

er extremity              

                    insp & palp - LLE                   Ankle:                  

 tender         

                          2019                   None                

 

                          Full Exam - Orthopedics                   MS: left low

er extremity              

                    insp & palp - LLE                   Foot:                   

tenderness at 

the calcaneus                   2019                   None               



 

                    Full Exam - Orthopedics                   Psychiatric       

            

orientation/consciousness                   Overall:                   oriented 

to person, place and time                   2019                   None   

            

 

                    Full Exam - Orthopedics                   Psychiatric       

            mood and

affect                    Overall:                   normal mood and affect     

 

                          2019                   None                

 

                    Full Exam - Orthopedics                   Psychiatric       

            

appearance                   Overall:                   well-groomed, good eye 

contact                   2019                   None                

 

                          Full Exam - Orthopedics                   MS: left low

er extremity              

                    insp & palp - LLE                   Foot:                   

tenderness at 

the metatarsals                   2019                   None             

  

 

                    Full Exam - Orthopedics                   Constitutional    

                

general appearance                   Overall:                   well nourished  

                          2019                   None                

 

                    Full Exam - Orthopedics                   Constitutional    

                

general appearance                   Overall:                   well developed  

                          2019                   None                

 

                    Full Exam - Orthopedics                   Constitutional    

                

general appearance                   Overall:                   in no acute 

distress                   2019                   None                

 

                    Full Exam - Orthopedics                   Eyes              

     

conjunctiva/eyelids                   Overall:                   conjunctiva 

clear                     2019                   None                

 

                    Full Exam - Orthopedics                   Eyes              

     

conjunctiva/eyelids                   Overall:                   eyelids normal 

                          2019                   None                

 

                    Full Exam - Orthopedics                   Ears/Nose/Throat  

                 

lips/teeth/gingiva                   Overall:                   benign lips     

                          2019                   None                

 

                    Full Exam - Orthopedics                   Ears/Nose/Throat  

                 

oral cavity/pharynx/larynx                    Overall:                   oral 

mucosa clear                   2019                   None                

 

                    Full Exam - Orthopedics                   Respiratory       

            

respiratory effort/rhythm                   Overall:                   no 

retractions                   2019                   None                

 

                    Full Exam - Orthopedics                   Respiratory       

            

respiratory effort/rhythm                   Overall:                   normal 

rate                      2019                   None                

 

                    Full Exam - Orthopedics                   Psychiatric       

            

orientation/consciousness                   Overall:                   oriented 

to person, place and time                   2019                   None   

            

 

                    Full Exam - Orthopedics                   Psychiatric       

            mood and

affect                    Overall:                   normal mood and affect     

 

                          2019                   None                

 

                    Full Exam - Orthopedics                   Psychiatric       

            

appearance                   Overall:                   well-groomed, good eye 

contact                   2019                   None                

 

                    Full Exam - Orthopedics                   MS: head/neck     

              insp &

palp - H/N                   Overall:                   head atraumatic         

                          2019                   None                

 

                          Full Exam - Orthopedics                   MS: left low

er extremity              

                    insp & palp - LLE                   Knee:                   

joint tenderness

                          2019                   None                

 

                          Full Exam - Orthopedics                   MS: left low

er extremity              

                    insp & palp - LLE                   Ankle:                  

 joint swelling 

                          2019                   None                

 

                          Full Exam - Orthopedics                   MS: left low

er extremity              

                    insp & palp - LLE                   Ankle:                  

 tender         

                          2019                   None                

 

                          Full Exam - Orthopedics                   MS: left low

er extremity              

                    insp & palp - LLE                   Foot:                   

tenderness at 

the calcaneus                   2019                   None               



 

                    Full Exam - General                    Constitutional   

                 

general appearance                   Development:                   well 

developed                   08/15/2019                   None                

 

                    Full Exam - General                    Constitutional   

                 

general appearance                   Development:                   appears 

stated age                   08/15/2019                   None                

 

                    Full Exam - General                    Constitutional   

                 

general appearance                      Hygiene/Attention to Grooming:          

   

                    good hygiene                   08/15/2019                   

None           

    

 

                    Full Exam - General                    Eyes             

      

conjunctiva/eyelids                   Overall:                   conjunctiva 

clear                     08/15/2019                   None                

 

                    Full Exam - General                    Eyes             

      

conjunctiva/eyelids                   Overall:                   cornea clear   

                          08/15/2019                   None                

 

                    Full Exam - General                    Eyes             

      

conjunctiva/eyelids                   Overall:                   eyelids normal 

                          08/15/2019                   None                

 

                    Full Exam - General                    Eyes             

      pupils and 

irises                    Overall:                   pupils equal, round, 

reactive to light and accomodation                   08/15/2019                 

                                        None                

 

                    Full Exam - General                    Ears/Nose/Throat 

                  

otoscopic exam                   Overall:                   external auditory 

canals clear                   08/15/2019                   None                

 

                    Full Exam - General                    Ears/Nose/Throat 

                  

otoscopic exam                   Overall:                   tympanic membranes 

clear                     08/15/2019                   None                

 

                    Full Exam - General                    Ears/Nose/Throat 

                  

lips/teeth/gingiva                   Overall:                   benign lips     

                          08/15/2019                   None                

 

                    Full Exam - General                    Ears/Nose/Throat 

                  

lips/teeth/gingiva                   Overall:                   normal dentition

                          08/15/2019                   None                

 

                    Full Exam - General                    Ears/Nose/Throat 

                  

oral cavity/pharynx/larynx                    Overall:                   oral 

mucosa clear                   08/15/2019                   None                

 

                    Full Exam - General 1995                   Ears/Nose/Throat 

                  

oral cavity/pharynx/larynx                    Overall:                   

oropharyngeal mucosa clear                   08/15/2019                   None  

             

 

                    Full Exam - General                    Ears/Nose/Throat 

                  

oral cavity/pharynx/larynx                    Overall:                   

hypopharynx benign                   08/15/2019                   None          

     

 

                    Full Exam - General                    Ears/Nose/Throat 

                  

oral cavity/pharynx/larynx                    Overall:                   no 

masses                    08/15/2019                   None                

 

                    Full Exam - General                    Respiratory      

             

auscultation                   Overall:                   breath sounds clear 

bilaterally                   08/15/2019                   None                

 

                    Full Exam - General                    Respiratory      

             

respiratory effort/rhythm                   Overall:                   no 

retractions                   08/15/2019                   None                

 

                    Full Exam - General                    Respiratory      

             

respiratory effort/rhythm                   Overall:                   normal 

rate                      08/15/2019                   None                

 

                    Full Exam - General                    Cardiovascular   

                

extremities                   Overall:                   no clubbing            

                          08/15/2019                   None                

 

                    Full Exam - General                    Cardiovascular   

                

auscultation of heart                   Overall:                   regular rate 

                          08/15/2019                   None                

 

                    Full Exam - General                    Cardiovascular   

                

auscultation of heart                   Overall:                   normal heart 

sounds                    08/15/2019                   None                

 

                    Full Exam - General                    Abdomen          

         abdominal 

exam                      Overall:                   no tenderness              

   

                          08/15/2019                   None                

 

                    Full Exam - General                    Abdomen          

         abdominal 

exam                      Overall:                   normal bowel sounds        

   

                          08/15/2019                   None                

 

                    Full Exam - General                    Lymphatic        

           neck 

nodes                     Overall:                   anterior cervical chain 

benign                    08/15/2019                   None                

 

                    Full Exam - General                    Lymphatic        

           neck 

nodes                     Overall:                   posterior cervical chain 

benign                    08/15/2019                   None                

 

                    Full Exam - General                    Musculoskeletal  

                 

spine, ribs and pelvis                   Overall:                   spine benign

                          08/15/2019                   None                

 

                    Full Exam - General                    Musculoskeletal  

                 

spine, ribs and pelvis                   Overall:                   sacroiliac 

joint benign                   08/15/2019                   None                

 

                    Full Exam - General                    Musculoskeletal  

                 

spine, ribs and pelvis                   Overall:                   good posture

                          08/15/2019                   None                

 

                    Full Exam - General                    Musculoskeletal  

                 

head and neck                   Overall:                   head atraumatic      

                          08/15/2019                   None                

 

                    Full Exam - General                    Musculoskeletal  

                 

head and neck                   Overall:                   cervical spine benign

                          08/15/2019                   None                

 

                    Full Exam - General                    Integument       

            

inspection of skin                   Overall:                   few scattered 

moles, no gross abnormalities                   08/15/2019                   

None                

 

                    Full Exam - General                    Neurologic       

            deep 

tendon reflexes                   Overall:                   deep tendon 

reflexes intact                   08/15/2019                   None             

  

 

                    Full Exam - General                    Neurologic       

            cranial 

nerves                    Overall:                   crainial nerves 2 - 12 

grossly intact                   08/15/2019                   None              

 

 

                    Full Exam - General                    Psychiatric      

             

orientation/consciousness                   Overall:                   oriented 

to person, place and time                   08/15/2019                   None   

            

 

                    Full Exam - General                    Psychiatric      

             mood 

and affect                   Overall:                   normal mood and affect  

                          08/15/2019                   None                



                                                                              



Procedures

          No Procedures data                                                    
               



Vital Signs

                      



                          Date                      Vital                

 

                          2019                                       Heart

 Rate 1: 68 bpm           

                                                            Height:             

               

                                                            Weight:             

                      

 

                          2019                                       Blood

 Pressure 1: 132/74 Code: 

8480-6                                                         BMI: 27.4 Code: 

23401-6                                                         Heart Rate 1: 66

bpm                                                         Height: 5'2"        

                                                            SpO2: 98%           

            

                                                            Weight: 150 lbs     

                           

  

 

                          08/15/2019                                       Blood

 Pressure 1: 104/60 Code: 

8480-6                                                         BMI: 27.5 Code: 

99326-3                                                         Heart Rate 1: 66

bpm                                                         Height: 5'2"        

                                                            SpO2: 98%           

            

                                                            Weight: 150 lbs 5 oz

                           

      



                                                                                
                           



Functional Status

          No Functional Status data                                             
            



History of Present Illness

                      



                    Symptom Name                   Status                   Resu

lt                  

                          Effective Date                   Notes                

 

                                   Location                   on the left       

            

2019                              None                

 

                                   Quality                   constant           

        

2019                              None                

 

                                        Onset and Resolution                   s

udden in onset       

                          2019                   None                

 

                                        Onset of Symptom                   2 wee

ks ago               

                          2019                   None                

 

                                        Frequency of Episodes                   

daily                

                          2019                   None                

 

                                        Pertinent Findings                   dec

reased range of 

motion                    2019                   None                

 

                                        Pertinent Findings                   [place]

ping                 

                          2019                   None                

 

                                        Pertinent Findings                   [place]

n with movement      

                          2019                   None                

 

                                        Pertinent Findings                   sti

ffness               

                          2019                   None                

 

                                   Location                   on the left       

            

2019                              None                

 

                                   Quality                   constant           

        

2019                              None                

 

                                        Onset and Resolution                   s

udden in onset       

                          2019                   None                

 

                                        Onset of Symptom                   2 wee

ks ago               

                          2019                   None                

 

                                        Frequency of Episodes                   

daily                

                          2019                   None                

 

                                        Pertinent Findings                   dec

reased range of 

motion                    2019                   None                

 

                                        Pertinent Findings                   [place]

ping                 

                          2019                   None                

 

                                        Pertinent Findings                   [place]

n with movement      

                          2019                   None                

 

                                        Pertinent Findings                   sti

ffness               

                          2019                   None                

 

                                   Quality                   intermittent       

            

08/15/2019                              None                

 

                                        Onset and Resolution                   o

ngoing               

                          08/15/2019                   None                

 

                                   Quality                   intermittent       

            

08/15/2019                              None                

 

                                        Onset and Resolution                   g

radual in onset      

                          08/15/2019                   None                

 

                                        Onset of Symptom                   2 wee

ks ago               

                          08/15/2019                   None                

 

                                        Pertinent Findings                   blo

ating                

                          08/15/2019                   hx of constipation       

          

 

                                        Onset of Symptom                   Denie

s _ years ago        

                          08/15/2019                   None                

 

                                        Pertinent Findings                   Den

ies nausea           

                          08/15/2019                   None                

 

                                        Pertinent Findings                   Den

ies decreased energy 

level                     08/15/2019                   None                



                                                                              



Advance Directives

          No Advance Directive data                                             
                      



Encounters

                      



                    Encounter                   Performer                   Loca

tion                

                          Codes                     Date                

 

                                                            41008 EST. PATIENT, 

LEVEL III

Diagnosis: Pain in left ankle and joints of left foot[ICD10: M25.572]

Diagnosis: Pain in left knee[ICD10: M25.562]                                    
                    Flakita Crump MD, [facility]       

            CPT-4: 

60256                                   2019                

 

                                                            44180 EST. PATIENT, 

LEVEL III

Diagnosis: Pain in left ankle and joints of left foot[ICD10: M25.572]

Diagnosis: Pain in left knee[ICD10: M25.562]                                    
                    [facility]       

            CPT-4: 

85700                                   2019                

 

                                                            ([id number]) PREV VISIT N

EW AGE 40-64

Diagnosis: Encounter for general adult medical examination without abnormal 
findings[ICD10: Z00.00]                                     [facility]                   CPT-4: 70865 

              

                                        08/15/2019                



                                                                                
                                                                   



Plan of Care

                      



                    Planned Activity                   Notes                   C

odes                

                          Status                    Date                

 

                          Visit Plan:                    Left knee, ankle, foot 

pain - will send RX - will

refer to ortho - The pt is to use prn antiinflammatories to manage acute pain. 
The patient is to call the office if the pain is worsening or does not improve.

                                                            2019          

  

   

 

                          Patient Education: Patient Medication Summary         

                          

                                        Completed                   2019  

              

 

                    Care Plan: Referral Order                                   

    SNOMED-CT : 

[id number]

                          Pending                   2019                

 

                          Visit Plan:                    Left knee, ankle, foot 

pain - will send RX - if 

no improvement will refer to ortho - The pt is to use prn antiinflammatories to 
manage acute pain. The patient is to call the office if the pain is worsening or
does not improve.

                                                            2019          

  

   

 

                                        Appointment: Flakita Castro 

WPtel:[phone]

                                        [address] [place], [place] [id number]

                                                           (30 min) Complex   

 

                                        2019                

 

                          Patient Education: Patient Medication Summary         

                          

                                        Completed                   2019  

              

 

                          Visit Plan:                    Well Adult - pt was cou

nseled about diet, 

exercise, and encouraged to follow a heart healthy diet and increase activity 
level. The patient was instructed to RTC yearly for well adult exams and PRN for
acute illnesses. The pt was also instructed to have yearly labs for check of 
cholesterol, thyroid, chem panel, CBC, and renal functioning. Post-surgical 
menopause - I have advised the pt that we will look for her medications from [facility]
to find out what her depo dose is so I can wean her off of it. Anxiety - the 
patient has uncontrolled anxiety and will benefit from an SSRI on a daily basis 
to attempt control of the symptoms of anxiety (tachycardia, overwhelming 
sensations, stress, insomnia, etc). stop clonidine due to hypotension

                                                            08/15/2019          

  

   

 

                          Patient Education: Patient Medication Summary         

                          

                                        Completed                   08/15/2019  

              

 

                    Referral: Álvaro Sellers                    Referral       

                    

                          Appointment Confirmed                                 

  



                                                                                
                                                                             



Instructions

                      



                                        Comment                

 

                                                            . Well Adult - pt wa

s counseled about diet, exercise, and 

encouraged to follow a heart healthy diet and increase activity level.  The 
patient was instructed to RTC yearly for well adult exams and PRN for acute 
illnesses.  The pt was also instructed to have yearly labs for check of 
cholesterol, thyroid, chem panel, CBC, and renal functioning.



Post-surgical menopause - I have advised the pt that we will look for her 
medications from [facility] to find out what her depo dose is so I can wean her off of 
it.



Anxiety - the patient has uncontrolled anxiety and will benefit from an SSRI on 
a daily basis to attempt control of the symptoms of anxiety (tachycardia, 
overwhelming sensations, stress, insomnia, etc). 

stop clonidine due to hypotension                                  

 

                                                            tomorrow at 10:15 wi

th the nurse practitioner. Left knee, 

ankle, foot pain - will send RX - will refer to ortho - The pt is to use prn 
antiinflammatories to manage acute pain. The patient is to call the office if 
the pain is worsening or does not improve. 



                                  

 

                                                            . Left knee, ankle, 

foot pain - will send RX - if no 

improvement will refer to ortho - The pt is to use prn antiinflammatories to 
manage acute pain. The patient is to call the office if the pain is worsening or
does not improve.

## 2020-05-12 NOTE — XMS REPORT
CCD document using C-CDA

                             Created on: 2019



Rosibel Cole

External Reference #: [id number]

: 1970

Sex: Female



Demographics





                          Address                   [address]

[place]  [address]

 

                          Home Phone                [phone]

 

                          Preferred Language        English

 

                          Marital Status            Unknown

 

                          Scientology Affiliation     Unknown

 

                          Race                      White

 

                          Ethnic Group              Not  or 





Author





                          Author                    Rosibel Crump

 

                          Organization              [facility]

 

                          Address                   [address] [place]  [address]



 

                          Phone                     [phone]







Care Team Providers





                    Care Team Member Name Role                Phone

 

                          PP                        Unavailable

 

                          CCM                       Unavailable



                                            



Summary Purpose

          Interface Exchange                                                    
               



Insurance Providers

                      



                    Payer name                   Policy type / Coverage type    

               

Covered party ID                   Effective Begin Date                   

Effective End Date                

 

                          [facility]

mercial Insurance         

                    [id number]                   Unknown                   Unk

nown       

        



                                                                              



Family history

                      



Mother            



                          Diagnosis                   Age At Onset              

  

 

                          Diabetes mellitus Type 2                   Unknown    

            

 

                          Alcoholism                   Unknown                

 

                          Depression                   Unknown                



            



Father            



                          Diagnosis                   Age At Onset              

  

 

                          Diabetes mellitus Type 2                   Unknown    

            



                                                                                
       



Social History

                      



                    Social History Element                   Codes              

     Description    

                                        Effective Dates                

 

                    Marital status                   Unknown                   M

arried

Ha                                   08/15/2019                

 

                    Number of children                   Unknown                

   2                

                                        08/15/2019                

 

                    Employment                   Unknown                   [employer]

ntly employed

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                

 

                    Tobacco history                   SNOMED CT: [id number]

                   

Current some days smoker                   08/15/2019                

 

                    Alcohol history                   SNOMED CT: [id number]      

             Never 

drinks alcohol                          08/15/2019                



                                                                                
                                               



Allergies, Adverse Reactions, Alerts

                      



                Substance                   Reaction                   Codes    

               

Entered Date                   Inactivated Date                   Status        

       

 

                                              Penicillin                        

                          

                    Unknown                   08/15/2019                   No In

active Date   

                                        Active                



                                                                              



Past Medical History

                      



                    Illness                   Codes                   Condition 

Status              

                          Onset Date                   Resolved Date            

    

 

                                                            Pain in left ankle a

nd joints of left foot                  

                                        ICD-9: 719.47

ICD-10: M25.572                   Active                    2019          

 

                                        Unknown                

 

                                              Pain in left knee                 

                    ICD-9:

719.46

ICD-10: M25.562                   Active                    2019          

 

                                        Unknown                

 

                                                            Encounter for genera

l adult medical examination without 

abnormal findings                                     ICD-9: V70.0

ICD-10: Z00.00                   Active                    08/15/2019           

 

                                        Unknown                

 

                                                            Generalized anxiety 

disorder                                

                                        ICD-9: 300.00

ICD-10: F41.1                   Active                    08/15/2019            

 

                                        Unknown                

 

                                                            Menopausal and femal

e climacteric states                    

                                        ICD-9: 627.2

ICD-10: N95.1                   Active                    08/15/2019            

 

                                        Unknown                



                                                                                
                                               



Problems

                      



                    Condition                   Codes                   Effectiv

e Dates             

                                        Condition Status                

 

                                                            Pain in left ankle a

nd joints of left foot                  

                                        ICD-9: 719.47

ICD-10: M25.572                   2019                   Active           

    

 

                                              Pain in left knee                 

                    ICD-9:

719.46

ICD-10: M25.562                   2019                   Active           

    

 

                                                            Encounter for genera

l adult medical examination without 

abnormal findings                                     ICD-9: V70.0

ICD-10: Z00.00                   08/15/2019                   Active            

   

 

                                                            Generalized anxiety 

disorder                                

                                        ICD-9: 300.00

ICD-10: F41.1                   08/15/2019                   Active             

  

 

                                                            Menopausal and femal

e climacteric states                    

                                        ICD-9: 627.2

ICD-10: N95.1                   08/15/2019                   Active             

  



                                                                                
                                               



Medications

                      



                    Medication                   Codes                   Instruc

tions               

                    Start Date                   Stop Date                   Sta

tus              

                                        Fill Instructions                

 

                                                            gabapentin 100 mg ca

psule                                   

                    RxNorm: 385241                   1 Capsule(s) PO QHS        

           

2019                   10/11/2019                   Active                

                                                        

 

                                              naproxen 500 mg tablet            

                         

RxNorm: 948556                   1 Tablet(s) PO BID                   2019                   Inactive                       

  

         

 

                                                            escitalopram 5 mg ta

blet                                    

                    RxNorm: 308663                   1 Tablet(s) PO QHS         

          08/15/2019

                    02/10/2020                   Active                         

  

       

 

                                                            ranitidine 150 mg ca

psule                                   

                    RxNorm: 270096                   1 Capsule(s) PO BID        

           No Start

Date                                       Active                               

   

 

                                              eszopiclone 1 mg tablet           

                          

RxNorm: 362188                   Tablet(s) PO as needed                   No 

Start Date                                       Active                         

         

 

                                              acyclovir 400 mg tablet           

                          

RxNorm: 164003                          1 Tablet(s) PO PRN fever blisters       

       

                    No Start Date                                       Active  

                

                                                        

 

                                                            clonidine HCl 0.1 mg

 tablet                                 

                    RxNorm: 106207                   Tablet(s) PO as needed     

              No 

Start Date                   2019                   Inactive              

                                                        



                                                                                
                                                         



Medication Administered

          No Medication Administered data                                       
                            



Immunizations

          No Immunization data                                                  
       



Assessments

                      



                    Condition                   Codes                   Effectiv

e Dates             

  

 

                          Pain in left knee                   ICD-10: M25.562

ICD-9: 719.46                           2019                

 

                          Pain in left ankle and joints of left foot            

       ICD-10: M25.572

ICD-9: 719.47                           2019                

 

                          Generalized anxiety disorder                   ICD-10:

 F41.1

ICD-9: 300.00                           08/15/2019                

 

                                        Encounter for general adult medical exam

ination without abnormal findings       

                                        ICD-10: Z00.00

ICD-9: V70.0                            08/15/2019                

 

                          Menopausal and female climacteric states              

     ICD-10: N95.1

ICD-9: 627.2                            08/15/2019                



                                                                    



Reason For Visit

                      



                    Reason For Visit                   Effective Dates          

         Notes      

         

 

                    lower leg pain                   2019                 

                  

 

                    lower leg pain                   2019                 

                  

 

                    anxiety                   08/15/2019                        

           



                                                                              



Results

          No Results data                                                       
  



Review of Systems

                      



                    System                   Result                   Effective 

Dates               



 

                    Constitutional                   No recent illness          

         2019 

              

 

                    Constitutional                   No chills                  

 2019         

      

 

                    Constitutional                   No fever                   

2019          

     

 

                    Eyes                   No eye erythema                   2019             

  

 

                    Ears/Nose/Throat/Neck                   No nasal discharge  

                 

2019                

 

                    Cardiovascular                   No chest pain/pressure     

              

2019                

 

                    Cardiovascular                   No dyspnea                 

  2019        

       

 

                    Respiratory                   No cough                   2019             

  

 

                    Respiratory                   No dyspnea                   0

2019           

    

 

                    Musculoskeletal                   joint complaint           

        2019  

             

 

                    Neurologic                   No alteration of consciousness 

                  

2019                

 

                    Neurologic                   No mental status change        

           

2019                

 

                    Constitutional                   No recent illness          

         2019 

              

 

                    Constitutional                   No chills                  

 2019         

      

 

                    Constitutional                   No fever                   

2019          

     

 

                    Eyes                   No eye erythema                   2019             

  

 

                    Ears/Nose/Throat/Neck                   No nasal discharge  

                 

2019                

 

                    Cardiovascular                   No chest pain/pressure     

              

2019                

 

                    Cardiovascular                   No dyspnea                 

  2019        

       

 

                    Respiratory                   No cough                   2019             

  

 

                    Respiratory                   No dyspnea                   0

2019           

    

 

                    Musculoskeletal                   joint complaint           

        2019  

             

 

                    Neurologic                   No alteration of consciousness 

                  

2019                

 

                    Neurologic                   No mental status change        

           

2019                

 

                    Constitutional                   No recent illness          

         08/15/2019 

              

 

                    Constitutional                   No chills                  

 08/15/2019         

      

 

                    Constitutional                   No fatigue                 

  08/15/2019        

       

 

                    Constitutional                   No fever                   

08/15/2019          

     

 

                    Constitutional                   No insomnia                

   08/15/2019       

        

 

                    Constitutional                   No malaise                 

  08/15/2019        

       

 

                    Eyes                   No vision change                   08

/15/2019            

   

 

                    Ears/Nose/Throat/Neck                   No dental pain      

             

08/15/2019                

 

                    Ears/Nose/Throat/Neck                   No dizziness        

           

08/15/2019                

 

                    Ears/Nose/Throat/Neck                   No dysphagia        

           

08/15/2019                

 

                    Ears/Nose/Throat/Neck                   No headache         

          08/15/2019

               

 

                    Ears/Nose/Throat/Neck                   No hearing loss     

              

08/15/2019                

 

                    Ears/Nose/Throat/Neck                   No nasal allergies  

                 

08/15/2019                

 

                    Ears/Nose/Throat/Neck                   No sore throat      

             

08/15/2019                

 

                    Ears/Nose/Throat/Neck                   No postnasal drip   

                

08/15/2019                

 

                    Ears/Nose/Throat/Neck                   No sinus congestion 

                  

08/15/2019                

 

                    Cardiovascular                   No chest pain/pressure     

              

08/15/2019                

 

                    Cardiovascular                   No dyspnea                 

  08/15/2019        

       

 

                    Cardiovascular                   No edema                   

08/15/2019          

     

 

                    Cardiovascular                   No exercise intolerance    

               

08/15/2019                

 

                    Cardiovascular                   No fatigue                 

  08/15/2019        

       

 

                    Cardiovascular                   No near-syncope/dizziness  

                 

08/15/2019                

 

                    Respiratory                   No chest tightness            

       08/15/2019   

            

 

                    Respiratory                   No cough                   08/

15/2019             

  

 

                    Respiratory                   No dyspnea                   0

8/15/2019           

    

 

                    Respiratory                   No pedal edema                

   08/15/2019       

        

 

                    Gastrointestinal                   No abdominal pain        

           

08/15/2019                

 

                    Gastrointestinal                   No constipation          

         08/15/2019 

              

 

                    Gastrointestinal                   No diarrhea              

     08/15/2019     

          

 

                    Gastrointestinal                   No gastroesophageal reflu

x                   

08/15/2019                

 

                    Gastrointestinal                   No nausea                

   08/15/2019       

        

 

                    Gastrointestinal                   No vomiting              

     08/15/2019     

          

 

                    Genitourinary/Nephrology                   No dysuria       

            

08/15/2019                

 

                    Genitourinary/Nephrology                   No nocturia      

             

08/15/2019                

 

                          Genitourinary/Nephrology                   No urinary 

incontinence              

                                        08/15/2019                

 

                    Musculoskeletal                   No stiffness              

     08/15/2019     

          

 

                    Musculoskeletal                   No swelling               

    08/15/2019      

         

 

                    Musculoskeletal                   No muscle weakness        

           

08/15/2019                

 

                    Musculoskeletal                   No myalgias               

    08/15/2019      

         

 

                    Dermatologic                   No rash                   08/

15/2019             

  

 

                    Dermatologic                   No sores                   08

/15/2019            

   

 

                    Neurologic                   No dizziness                   

08/15/2019          

     

 

                    Neurologic                   No headache                   0

8/15/2019           

    

 

                    Neurologic                   No neck pain                   

08/15/2019          

     

 

                    Neurologic                   No syncope                   08

/15/2019            

   

 

                    Psychiatric                   anxiety                                 

 

 

                    Psychiatric                   No depression                 

  08/15/2019        

       



                                                                    



Physical Exam

                      



                    Exam Name                   System Name                   It

em Name             

                    Status                   Result                   Effective 

Dates          

                                        Notes                

 

                    Full Exam - Orthopedics                   Constitutional    

                

general appearance                   Overall:                   well nourished  

                          2019                   None                

 

                    Full Exam - Orthopedics                   Constitutional    

                

general appearance                   Overall:                   well developed  

                          2019                   None                

 

                    Full Exam - Orthopedics                   Constitutional    

                

general appearance                   Overall:                   in no acute 

distress                   2019                   None                

 

                    Full Exam - Orthopedics                   Eyes              

     

conjunctiva/eyelids                   Overall:                   conjunctiva 

clear                     2019                   None                

 

                    Full Exam - Orthopedics                   Eyes              

     

conjunctiva/eyelids                   Overall:                   eyelids normal 

                          2019                   None                

 

                    Full Exam - Orthopedics                   Ears/Nose/Throat  

                 

lips/teeth/gingiva                   Overall:                   benign lips     

                          2019                   None                

 

                    Full Exam - Orthopedics                   Ears/Nose/Throat  

                 

oral cavity/pharynx/larynx                    Overall:                   oral 

mucosa clear                   2019                   None                

 

                    Full Exam - Orthopedics                   Respiratory       

            

respiratory effort/rhythm                   Overall:                   no 

retractions                   2019                   None                

 

                    Full Exam - Orthopedics                   Respiratory       

            

respiratory effort/rhythm                   Overall:                   normal 

rate                      2019                   None                

 

                    Full Exam - Orthopedics                   MS: head/neck     

              insp &

palp - H/N                   Overall:                   head atraumatic         

                          2019                   None                

 

                          Full Exam - Orthopedics                   MS: left low

er extremity              

                    insp & palp - LLE                   Ankle:                  

 joint swelling 

                          2019                   None                

 

                          Full Exam - Orthopedics                   MS: left low

er extremity              

                    insp & palp - LLE                   Ankle:                  

 tender         

                          2019                   None                

 

                          Full Exam - Orthopedics                   MS: left low

er extremity              

                    insp & palp - LLE                   Foot:                   

tenderness at 

the calcaneus                   2019                   None               



 

                    Full Exam - Orthopedics                   Psychiatric       

            

orientation/consciousness                   Overall:                   oriented 

to person, place and time                   2019                   None   

            

 

                    Full Exam - Orthopedics                   Psychiatric       

            mood and

affect                    Overall:                   normal mood and affect     

 

                          2019                   None                

 

                    Full Exam - Orthopedics                   Psychiatric       

            

appearance                   Overall:                   well-groomed, good eye 

contact                   2019                   None                

 

                          Full Exam - Orthopedics                   MS: left low

er extremity              

                    insp & palp - LLE                   Foot:                   

tenderness at 

the metatarsals                   2019                   None             

  

 

                    Full Exam - Orthopedics                   Constitutional    

                

general appearance                   Overall:                   well nourished  

                          2019                   None                

 

                    Full Exam - Orthopedics                   Constitutional    

                

general appearance                   Overall:                   well developed  

                          2019                   None                

 

                    Full Exam - Orthopedics                   Constitutional    

                

general appearance                   Overall:                   in no acute 

distress                   2019                   None                

 

                    Full Exam - Orthopedics                   Eyes              

     

conjunctiva/eyelids                   Overall:                   conjunctiva 

clear                     2019                   None                

 

                    Full Exam - Orthopedics                   Eyes              

     

conjunctiva/eyelids                   Overall:                   eyelids normal 

                          2019                   None                

 

                    Full Exam - Orthopedics                   Ears/Nose/Throat  

                 

lips/teeth/gingiva                   Overall:                   benign lips     

                          2019                   None                

 

                    Full Exam - Orthopedics                   Ears/Nose/Throat  

                 

oral cavity/pharynx/larynx                    Overall:                   oral 

mucosa clear                   2019                   None                

 

                    Full Exam - Orthopedics                   Respiratory       

            

respiratory effort/rhythm                   Overall:                   no 

retractions                   2019                   None                

 

                    Full Exam - Orthopedics                   Respiratory       

            

respiratory effort/rhythm                   Overall:                   normal 

rate                      2019                   None                

 

                    Full Exam - Orthopedics                   Psychiatric       

            

orientation/consciousness                   Overall:                   oriented 

to person, place and time                   2019                   None   

            

 

                    Full Exam - Orthopedics                   Psychiatric       

            mood and

affect                    Overall:                   normal mood and affect     

 

                          2019                   None                

 

                    Full Exam - Orthopedics                   Psychiatric       

            

appearance                   Overall:                   well-groomed, good eye 

contact                   2019                   None                

 

                    Full Exam - Orthopedics                   MS: head/neck     

              insp &

palp - H/N                   Overall:                   head atraumatic         

                          2019                   None                

 

                          Full Exam - Orthopedics                   MS: left low

er extremity              

                    insp & palp - LLE                   Knee:                   

joint tenderness

                          2019                   None                

 

                          Full Exam - Orthopedics                   MS: left low

er extremity              

                    insp & palp - LLE                   Ankle:                  

 joint swelling 

                          2019                   None                

 

                          Full Exam - Orthopedics                   MS: left low

er extremity              

                    insp & palp - LLE                   Ankle:                  

 tender         

                          2019                   None                

 

                          Full Exam - Orthopedics                   MS: left low

er extremity              

                    insp & palp - LLE                   Foot:                   

tenderness at 

the calcaneus                   2019                   None               



 

                    Full Exam - General                    Constitutional   

                 

general appearance                   Development:                   well 

developed                   08/15/2019                   None                

 

                    Full Exam - General                    Constitutional   

                 

general appearance                   Development:                   appears 

stated age                   08/15/2019                   None                

 

                    Full Exam - General                    Constitutional   

                 

general appearance                      Hygiene/Attention to Grooming:          

   

                    good hygiene                   08/15/2019                   

None           

    

 

                    Full Exam - General                    Eyes             

      

conjunctiva/eyelids                   Overall:                   conjunctiva 

clear                     08/15/2019                   None                

 

                    Full Exam - General                    Eyes             

      

conjunctiva/eyelids                   Overall:                   cornea clear   

                          08/15/2019                   None                

 

                    Full Exam - General                    Eyes             

      

conjunctiva/eyelids                   Overall:                   eyelids normal 

                          08/15/2019                   None                

 

                    Full Exam - General                    Eyes             

      pupils and 

irises                    Overall:                   pupils equal, round, 

reactive to light and accomodation                   08/15/2019                 

                                        None                

 

                    Full Exam - General                    Ears/Nose/Throat 

                  

otoscopic exam                   Overall:                   external auditory 

canals clear                   08/15/2019                   None                

 

                    Full Exam - General                    Ears/Nose/Throat 

                  

otoscopic exam                   Overall:                   tympanic membranes 

clear                     08/15/2019                   None                

 

                    Full Exam - General                    Ears/Nose/Throat 

                  

lips/teeth/gingiva                   Overall:                   benign lips     

                          08/15/2019                   None                

 

                    Full Exam - General                    Ears/Nose/Throat 

                  

lips/teeth/gingiva                   Overall:                   normal dentition

                          08/15/2019                   None                

 

                    Full Exam - General                    Ears/Nose/Throat 

                  

oral cavity/pharynx/larynx                    Overall:                   oral 

mucosa clear                   08/15/2019                   None                

 

                    Full Exam - General 1995                   Ears/Nose/Throat 

                  

oral cavity/pharynx/larynx                    Overall:                   

oropharyngeal mucosa clear                   08/15/2019                   None  

             

 

                    Full Exam - General                    Ears/Nose/Throat 

                  

oral cavity/pharynx/larynx                    Overall:                   

hypopharynx benign                   08/15/2019                   None          

     

 

                    Full Exam - General                    Ears/Nose/Throat 

                  

oral cavity/pharynx/larynx                    Overall:                   no 

masses                    08/15/2019                   None                

 

                    Full Exam - General                    Respiratory      

             

auscultation                   Overall:                   breath sounds clear 

bilaterally                   08/15/2019                   None                

 

                    Full Exam - General                    Respiratory      

             

respiratory effort/rhythm                   Overall:                   no 

retractions                   08/15/2019                   None                

 

                    Full Exam - General                    Respiratory      

             

respiratory effort/rhythm                   Overall:                   normal 

rate                      08/15/2019                   None                

 

                    Full Exam - General                    Cardiovascular   

                

extremities                   Overall:                   no clubbing            

                          08/15/2019                   None                

 

                    Full Exam - General                    Cardiovascular   

                

auscultation of heart                   Overall:                   regular rate 

                          08/15/2019                   None                

 

                    Full Exam - General                    Cardiovascular   

                

auscultation of heart                   Overall:                   normal heart 

sounds                    08/15/2019                   None                

 

                    Full Exam - General                    Abdomen          

         abdominal 

exam                      Overall:                   no tenderness              

   

                          08/15/2019                   None                

 

                    Full Exam - General                    Abdomen          

         abdominal 

exam                      Overall:                   normal bowel sounds        

   

                          08/15/2019                   None                

 

                    Full Exam - General                    Lymphatic        

           neck 

nodes                     Overall:                   anterior cervical chain 

benign                    08/15/2019                   None                

 

                    Full Exam - General                    Lymphatic        

           neck 

nodes                     Overall:                   posterior cervical chain 

benign                    08/15/2019                   None                

 

                    Full Exam - General                    Musculoskeletal  

                 

spine, ribs and pelvis                   Overall:                   spine benign

                          08/15/2019                   None                

 

                    Full Exam - General                    Musculoskeletal  

                 

spine, ribs and pelvis                   Overall:                   sacroiliac 

joint benign                   08/15/2019                   None                

 

                    Full Exam - General                    Musculoskeletal  

                 

spine, ribs and pelvis                   Overall:                   good posture

                          08/15/2019                   None                

 

                    Full Exam - General                    Musculoskeletal  

                 

head and neck                   Overall:                   head atraumatic      

                          08/15/2019                   None                

 

                    Full Exam - General                    Musculoskeletal  

                 

head and neck                   Overall:                   cervical spine benign

                          08/15/2019                   None                

 

                    Full Exam - General                    Integument       

            

inspection of skin                   Overall:                   few scattered 

moles, no gross abnormalities                   08/15/2019                   

None                

 

                    Full Exam - General                    Neurologic       

            deep 

tendon reflexes                   Overall:                   deep tendon 

reflexes intact                   08/15/2019                   None             

  

 

                    Full Exam - General                    Neurologic       

            cranial 

nerves                    Overall:                   crainial nerves 2 - 12 

grossly intact                   08/15/2019                   None              

 

 

                    Full Exam - General                    Psychiatric      

             

orientation/consciousness                   Overall:                   oriented 

to person, place and time                   08/15/2019                   None   

            

 

                    Full Exam - General                    Psychiatric      

             mood 

and affect                   Overall:                   normal mood and affect  

                          08/15/2019                   None                



                                                                              



Procedures

          No Procedures data                                                    
               



Vital Signs

                      



                          Date                      Vital                

 

                          2019                                       Heart

 Rate 1: 68 bpm           

                                                            Height:             

               

                                                            Weight:             

                      

 

                          2019                                       Blood

 Pressure 1: 132/74 Code: 

8480-6                                                         BMI: 27.4 Code: 

70282-5                                                         Heart Rate 1: 66

bpm                                                         Height: 5'2"        

                                                            SpO2: 98%           

            

                                                            Weight: 150 lbs     

                           

  

 

                          08/15/2019                                       Blood

 Pressure 1: 104/60 Code: 

8480-6                                                         BMI: 27.5 Code: 

22640-5                                                         Heart Rate 1: 66

bpm                                                         Height: 5'2"        

                                                            SpO2: 98%           

            

                                                            Weight: 150 lbs 5 oz

                           

      



                                                                                
                           



Functional Status

          No Functional Status data                                             
            



History of Present Illness

                      



                    Symptom Name                   Status                   Resu

lt                  

                          Effective Date                   Notes                

 

                                   Location                   on the left       

            

2019                              None                

 

                                   Quality                   constant           

        

2019                              None                

 

                                        Onset and Resolution                   s

udden in onset       

                          2019                   None                

 

                                        Onset of Symptom                   2 wee

ks ago               

                          2019                   None                

 

                                        Frequency of Episodes                   

daily                

                          2019                   None                

 

                                        Pertinent Findings                   dec

reased range of 

motion                    2019                   None                

 

                                        Pertinent Findings                   [place]

ping                 

                          2019                   None                

 

                                        Pertinent Findings                   [place]

n with movement      

                          2019                   None                

 

                                        Pertinent Findings                   sti

ffness               

                          2019                   None                

 

                                   Location                   on the left       

            

2019                              None                

 

                                   Quality                   constant           

        

2019                              None                

 

                                        Onset and Resolution                   s

udden in onset       

                          2019                   None                

 

                                        Onset of Symptom                   2 wee

ks ago               

                          2019                   None                

 

                                        Frequency of Episodes                   

daily                

                          2019                   None                

 

                                        Pertinent Findings                   dec

reased range of 

motion                    2019                   None                

 

                                        Pertinent Findings                   [place]

ping                 

                          2019                   None                

 

                                        Pertinent Findings                   [place]

n with movement      

                          2019                   None                

 

                                        Pertinent Findings                   sti

ffness               

                          2019                   None                

 

                                   Quality                   intermittent       

            

08/15/2019                              None                

 

                                        Onset and Resolution                   o

ngoing               

                          08/15/2019                   None                

 

                                   Quality                   intermittent       

            

08/15/2019                              None                

 

                                        Onset and Resolution                   g

radual in onset      

                          08/15/2019                   None                

 

                                        Onset of Symptom                   2 wee

ks ago               

                          08/15/2019                   None                

 

                                        Pertinent Findings                   blo

ating                

                          08/15/2019                   hx of constipation       

          

 

                                        Onset of Symptom                   Denie

s _ years ago        

                          08/15/2019                   None                

 

                                        Pertinent Findings                   Den

ies nausea           

                          08/15/2019                   None                

 

                                        Pertinent Findings                   Den

ies decreased energy 

level                     08/15/2019                   None                



                                                                              



Advance Directives

          No Advance Directive data                                             
                      



Encounters

                      



                    Encounter                   Performer                   Loca

tion                

                          Codes                     Date                

 

                                                            46073 EST. PATIENT, 

LEVEL III

Diagnosis: Pain in left ankle and joints of left foot[ICD10: M25.572]

Diagnosis: Pain in left knee[ICD10: M25.562]                                    
                    Flakita Crump MD, [facility]       

            CPT-4: 

23975                                   2019                

 

                                                            04448 EST. PATIENT, 

LEVEL III

Diagnosis: Pain in left ankle and joints of left foot[ICD10: M25.572]

Diagnosis: Pain in left knee[ICD10: M25.562]                                    
                    [facility]       

            CPT-4: 

53542                                   2019                

 

                                                            ([id number]) PREV VISIT N

EW AGE 40-64

Diagnosis: Encounter for general adult medical examination without abnormal 
findings[ICD10: Z00.00]                                     [facility]                   CPT-4: 14818 

              

                                        08/15/2019                



                                                                                
                                                                   



Plan of Care

                      



                    Planned Activity                   Notes                   C

odes                

                          Status                    Date                

 

                          Visit Plan:                    Left knee, ankle, foot 

pain - will send RX - will

refer to ortho - The pt is to use prn antiinflammatories to manage acute pain. 
The patient is to call the office if the pain is worsening or does not improve.

                                                            2019          

  

   

 

                                        Appointment: Flakita Castro 

WPtel:[phone]

                                        [facility]

                                                           (30 min) Complex   

 

                                        2019                

 

                          Patient Education: Patient Medication Summary         

                          

                                        Completed                   2019  

              

 

                    Care Plan: Referral Order                                   

    SNOMED-CT : 

[id number]

                          Pending                   2019                

 

                          Visit Plan:                    Left knee, ankle, foot 

pain - will send RX - if 

no improvement will refer to ortho - The pt is to use prn antiinflammatories to 
manage acute pain. The patient is to call the office if the pain is worsening or
does not improve.

                                                            2019          

  

   

 

                                        Appointment: Flakita Castro 

WPtel:[phone]

                                        [facility]

                                                           (30 min) Complex   

 

                                        2019                

 

                          Patient Education: Patient Medication Summary         

                          

                                        Completed                   2019  

              

 

                          Visit Plan:                    Well Adult - pt was cou

nseled about diet, 

exercise, and encouraged to follow a heart healthy diet and increase activity 
level. The patient was instructed to RTC yearly for well adult exams and PRN for
acute illnesses. The pt was also instructed to have yearly labs for check of 
cholesterol, thyroid, chem panel, CBC, and renal functioning. Post-surgical 
menopause - I have advised the pt that we will look for her medications from [facility]
to find out what her depo dose is so I can wean her off of it. Anxiety - the 
patient has uncontrolled anxiety and will benefit from an SSRI on a daily basis 
to attempt control of the symptoms of anxiety (tachycardia, overwhelming 
sensations, stress, insomnia, etc). stop clonidine due to hypotension

                                                            08/15/2019          

  

   

 

                          Patient Education: Patient Medication Summary         

                          

                                        Completed                   08/15/2019  

              

 

                    Referral: Álvaro Sellers                    Referral       

                    

                          Appointment Confirmed                                 

  

 

                    Referral: Álvaro Sellers                    Referral       

                    

                          Completed                                   



                                                                                
                                                                                
      



Instructions

                      



                                        Comment                

 

                                                            . Well Adult - pt wa

s counseled about diet, exercise, and 

encouraged to follow a heart healthy diet and increase activity level.  The 
patient was instructed to RTC yearly for well adult exams and PRN for acute 
illnesses.  The pt was also instructed to have yearly labs for check of 
cholesterol, thyroid, chem panel, CBC, and renal functioning.



Post-surgical menopause - I have advised the pt that we will look for her 
medications from [facility] to find out what her depo dose is so I can wean her off of 
it.



Anxiety - the patient has uncontrolled anxiety and will benefit from an SSRI on 
a daily basis to attempt control of the symptoms of anxiety (tachycardia, 
overwhelming sensations, stress, insomnia, etc). 

stop clonidine due to hypotension                                  

 

                                                            tomorrow at 10:15 wi

th the nurse practitioner. Left knee, 

ankle, foot pain - will send RX - will refer to ortho - The pt is to use prn 
antiinflammatories to manage acute pain. The patient is to call the office if 
the pain is worsening or does not improve. 



                                  

 

                                                            . Left knee, ankle, 

foot pain - will send RX - if no 

improvement will refer to ortho - The pt is to use prn antiinflammatories to 
manage acute pain. The patient is to call the office if the pain is worsening or
does not improve.

## 2020-05-12 NOTE — XMS REPORT
CCD document using C-CDA

                             Created on: 2019



Rosibel Cole

External Reference #: [id number]

: 1970

Sex: Female



Demographics





                          Address                   [address]

[place]  [address]

 

                          Home Phone                [phone]

 

                          Preferred Language        English

 

                          Marital Status            Unknown

 

                          Sikh Affiliation     Unknown

 

                          Race                      White

 

                          Ethnic Group              Not  or 





Author





                          Author                    Rosibel Crump

 

                          Organization              [facility]

 

                          Address                   [address] [place]  [address]



 

                          Phone                     [phone]







Care Team Providers





                    Care Team Member Name Role                Phone

 

                          PP                        Unavailable

 

                          CCM                       Unavailable



                                            



Summary Purpose

          Interface Exchange                                                    
               



Insurance Providers

                      



                    Payer name                   Policy type / Coverage type    

               

Covered party ID                   Effective Begin Date                   

Effective End Date                

 

                          [facility]

mercial Insurance         

                    [id number]                   Unknown                   Unk

nown       

        



                                                                              



Family history

                      



Mother            



                          Diagnosis                   Age At Onset              

  

 

                          Diabetes mellitus Type 2                   Unknown    

            

 

                          Alcoholism                   Unknown                

 

                          Depression                   Unknown                



            



Father            



                          Diagnosis                   Age At Onset              

  

 

                          Diabetes mellitus Type 2                   Unknown    

            



                                                                                
       



Social History

                      



                    Social History Element                   Codes              

     Description    

                                        Effective Dates                

 

                    Marital status                   Unknown                   M

arried

Ha                                   08/15/2019                

 

                    Number of children                   Unknown                

   2                

                                        08/15/2019                

 

                    Employment                   Unknown                   [employer]

ntly employed

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                

 

                    Tobacco history                   SNOMED CT: [id number]

                   

Current some days smoker                   08/15/2019                

 

                    Alcohol history                   SNOMED CT: [id number]      

             Never 

drinks alcohol                          08/15/2019                



                                                                                
                                               



Allergies, Adverse Reactions, Alerts

                      



                Substance                   Reaction                   Codes    

               

Entered Date                   Inactivated Date                   Status        

       

 

                                              Penicillin                        

                          

                    Unknown                   08/15/2019                   No In

active Date   

                                        Active                



                                                                              



Past Medical History

                      



                    Illness                   Codes                   Condition 

Status              

                          Onset Date                   Resolved Date            

    

 

                                                            Pain in left ankle a

nd joints of left foot                  

                                        ICD-9: 719.47

ICD-10: M25.572                   Active                    2019          

 

                                        Unknown                

 

                                              Pain in left knee                 

                    ICD-9:

719.46

ICD-10: M25.562                   Active                    2019          

 

                                        Unknown                

 

                                                            Encounter for genera

l adult medical examination without 

abnormal findings                                     ICD-9: V70.0

ICD-10: Z00.00                   Active                    08/15/2019           

 

                                        Unknown                

 

                                                            Generalized anxiety 

disorder                                

                                        ICD-9: 300.00

ICD-10: F41.1                   Active                    08/15/2019            

 

                                        Unknown                

 

                                                            Menopausal and femal

e climacteric states                    

                                        ICD-9: 627.2

ICD-10: N95.1                   Active                    08/15/2019            

 

                                        Unknown                



                                                                                
                                               



Problems

                      



                    Condition                   Codes                   Effectiv

e Dates             

                                        Condition Status                

 

                                                            Pain in left ankle a

nd joints of left foot                  

                                        ICD-9: 719.47

ICD-10: M25.572                   2019                   Active           

    

 

                                              Pain in left knee                 

                    ICD-9:

719.46

ICD-10: M25.562                   2019                   Active           

    

 

                                                            Encounter for genera

l adult medical examination without 

abnormal findings                                     ICD-9: V70.0

ICD-10: Z00.00                   08/15/2019                   Active            

   

 

                                                            Generalized anxiety 

disorder                                

                                        ICD-9: 300.00

ICD-10: F41.1                   08/15/2019                   Active             

  

 

                                                            Menopausal and femal

e climacteric states                    

                                        ICD-9: 627.2

ICD-10: N95.1                   08/15/2019                   Active             

  



                                                                                
                                               



Medications

                      



                    Medication                   Codes                   Instruc

tions               

                    Start Date                   Stop Date                   Sta

tus              

                                        Fill Instructions                

 

                                              naproxen 500 mg tablet            

                         

RxNorm: 368993                   1 Tablet(s) PO BID                   2019                   Active                         

  

       

 

                                                            escitalopram 5 mg ta

blet                                    

                    RxNorm: 288461                   1 Tablet(s) PO QHS         

          08/15/2019

                    02/10/2020                   Active                         

  

       

 

                                                            ranitidine 150 mg ca

psule                                   

                    RxNorm: 190495                   1 Capsule(s) PO BID        

           No Start

Date                                       Active                               

   

 

                                              eszopiclone 1 mg tablet           

                          

RxNorm: 181925                   Tablet(s) PO as needed                   No 

Start Date                                       Active                         

         

 

                                              acyclovir 400 mg tablet           

                          

RxNorm: 589198                          1 Tablet(s) PO PRN fever blisters       

       

                    No Start Date                                       Active  

                

                                                        

 

                                                            clonidine HCl 0.1 mg

 tablet                                 

                    RxNorm: 814939                   Tablet(s) PO as needed     

              No 

Start Date                   2019                   Inactive              

                                                        



                                                                                
                                               



Medication Administered

          No Medication Administered data                                       
                            



Immunizations

          No Immunization data                                                  
       



Assessments

                      



                    Condition                   Codes                   Effectiv

e Dates             

  

 

                          Pain in left ankle and joints of left foot            

       ICD-10: M25.572

ICD-9: 719.47                           2019                

 

                          Pain in left knee                   ICD-10: M25.562

ICD-9: 719.46                           2019                

 

                          Generalized anxiety disorder                   ICD-10:

 F41.1

ICD-9: 300.00                           08/15/2019                

 

                                        Encounter for general adult medical exam

ination without abnormal findings       

                                        ICD-10: Z00.00

ICD-9: V70.0                            08/15/2019                

 

                          Menopausal and female climacteric states              

     ICD-10: N95.1

ICD-9: 627.2                            08/15/2019                



                                                                    



Reason For Visit

                      



                    Reason For Visit                   Effective Dates          

         Notes      

         

 

                    lower leg pain                   2019                 

                  

 

                    anxiety                   08/15/2019                        

           



                                                                              



Results

          No Results data                                                       
  



Review of Systems

                      



                    System                   Result                   Effective 

Dates               



 

                    Constitutional                   No recent illness          

         2019 

              

 

                    Constitutional                   No chills                  

 2019         

      

 

                    Constitutional                   No fever                   

2019          

     

 

                    Eyes                   No eye erythema                   2019             

  

 

                    Ears/Nose/Throat/Neck                   No nasal discharge  

                 

2019                

 

                    Cardiovascular                   No chest pain/pressure     

              

2019                

 

                    Cardiovascular                   No dyspnea                 

  2019        

       

 

                    Respiratory                   No cough                   2019             

  

 

                    Respiratory                   No dyspnea                   0

2019           

    

 

                    Musculoskeletal                   joint complaint           

        2019  

             

 

                    Neurologic                   No alteration of consciousness 

                  

2019                

 

                    Neurologic                   No mental status change        

           

2019                

 

                    Constitutional                   No recent illness          

         08/15/2019 

              

 

                    Constitutional                   No chills                  

 08/15/2019         

      

 

                    Constitutional                   No fatigue                 

  08/15/2019        

       

 

                    Constitutional                   No fever                   

08/15/2019          

     

 

                    Constitutional                   No insomnia                

   08/15/2019       

        

 

                    Constitutional                   No malaise                 

  08/15/2019        

       

 

                    Eyes                   No vision change                   08

/15/2019            

   

 

                    Ears/Nose/Throat/Neck                   No dental pain      

             

08/15/2019                

 

                    Ears/Nose/Throat/Neck                   No dizziness        

           

08/15/2019                

 

                    Ears/Nose/Throat/Neck                   No dysphagia        

           

08/15/2019                

 

                    Ears/Nose/Throat/Neck                   No headache         

          08/15/2019

               

 

                    Ears/Nose/Throat/Neck                   No hearing loss     

              

08/15/2019                

 

                    Ears/Nose/Throat/Neck                   No nasal allergies  

                 

08/15/2019                

 

                    Ears/Nose/Throat/Neck                   No sore throat      

             

08/15/2019                

 

                    Ears/Nose/Throat/Neck                   No postnasal drip   

                

08/15/2019                

 

                    Ears/Nose/Throat/Neck                   No sinus congestion 

                  

08/15/2019                

 

                    Cardiovascular                   No chest pain/pressure     

              

08/15/2019                

 

                    Cardiovascular                   No dyspnea                 

  08/15/2019        

       

 

                    Cardiovascular                   No edema                   

08/15/2019          

     

 

                    Cardiovascular                   No exercise intolerance    

               

08/15/2019                

 

                    Cardiovascular                   No fatigue                 

  08/15/2019        

       

 

                    Cardiovascular                   No near-syncope/dizziness  

                 

08/15/2019                

 

                    Respiratory                   No chest tightness            

       08/15/2019   

            

 

                    Respiratory                   No cough                   08/

15/2019             

  

 

                    Respiratory                   No dyspnea                   0

8/15/2019           

    

 

                    Respiratory                   No pedal edema                

   08/15/2019       

        

 

                    Gastrointestinal                   No abdominal pain        

           

08/15/2019                

 

                    Gastrointestinal                   No constipation          

         08/15/2019 

              

 

                    Gastrointestinal                   No diarrhea              

     08/15/2019     

          

 

                    Gastrointestinal                   No gastroesophageal reflu

x                   

08/15/2019                

 

                    Gastrointestinal                   No nausea                

   08/15/2019       

        

 

                    Gastrointestinal                   No vomiting              

     08/15/2019     

          

 

                    Genitourinary/Nephrology                   No dysuria       

            

08/15/2019                

 

                    Genitourinary/Nephrology                   No nocturia      

             

08/15/2019                

 

                          Genitourinary/Nephrology                   No urinary 

incontinence              

                                        08/15/2019                

 

                    Musculoskeletal                   No stiffness              

     08/15/2019     

          

 

                    Musculoskeletal                   No swelling               

    08/15/2019      

         

 

                    Musculoskeletal                   No muscle weakness        

           

08/15/2019                

 

                    Musculoskeletal                   No myalgias               

    08/15/2019      

         

 

                    Dermatologic                   No rash                   08/

15/2019             

  

 

                    Dermatologic                   No sores                   08

/15/2019            

   

 

                    Neurologic                   No dizziness                   

08/15/2019          

     

 

                    Neurologic                   No headache                   0

8/15/2019           

    

 

                    Neurologic                   No neck pain                   

08/15/2019          

     

 

                    Neurologic                   No syncope                   08

/15/2019            

   

 

                    Psychiatric                   anxiety                                 

 

 

                    Psychiatric                   No depression                 

  08/15/2019        

       



                                                                    



Physical Exam

                      



                    Exam Name                   System Name                   It

em Name             

                    Status                   Result                   Effective 

Dates          

                                        Notes                

 

                    Full Exam - Orthopedics                   Constitutional    

                

general appearance                   Overall:                   well nourished  

                          2019                   None                

 

                    Full Exam - Orthopedics                   Constitutional    

                

general appearance                   Overall:                   well developed  

                          2019                   None                

 

                    Full Exam - Orthopedics                   Constitutional    

                

general appearance                   Overall:                   in no acute 

distress                   2019                   None                

 

                    Full Exam - Orthopedics                   Eyes              

     

conjunctiva/eyelids                   Overall:                   conjunctiva 

clear                     2019                   None                

 

                    Full Exam - Orthopedics                   Eyes              

     

conjunctiva/eyelids                   Overall:                   eyelids normal 

                          2019                   None                

 

                    Full Exam - Orthopedics                   Ears/Nose/Throat  

                 

lips/teeth/gingiva                   Overall:                   benign lips     

                          2019                   None                

 

                    Full Exam - Orthopedics                   Ears/Nose/Throat  

                 

oral cavity/pharynx/larynx                    Overall:                   oral 

mucosa clear                   2019                   None                

 

                    Full Exam - Orthopedics                   Respiratory       

            

respiratory effort/rhythm                   Overall:                   no 

retractions                   2019                   None                

 

                    Full Exam - Orthopedics                   Respiratory       

            

respiratory effort/rhythm                   Overall:                   normal 

rate                      2019                   None                

 

                    Full Exam - Orthopedics                   Psychiatric       

            

orientation/consciousness                   Overall:                   oriented 

to person, place and time                   2019                   None   

            

 

                    Full Exam - Orthopedics                   Psychiatric       

            mood and

affect                    Overall:                   normal mood and affect     

 

                          2019                   None                

 

                    Full Exam - Orthopedics                   Psychiatric       

            

appearance                   Overall:                   well-groomed, good eye 

contact                   2019                   None                

 

                    Full Exam - Orthopedics                   MS: head/neck     

              insp &

palp - H/N                   Overall:                   head atraumatic         

                          2019                   None                

 

                          Full Exam - Orthopedics                   MS: left low

er extremity              

                    insp & palp - LLE                   Knee:                   

joint tenderness

                          2019                   None                

 

                          Full Exam - Orthopedics                   MS: left low

er extremity              

                    insp & palp - LLE                   Ankle:                  

 joint swelling 

                          2019                   None                

 

                          Full Exam - Orthopedics                   MS: left low

er extremity              

                    insp & palp - LLE                   Ankle:                  

 tender         

                          2019                   None                

 

                          Full Exam - Orthopedics                   MS: left low

er extremity              

                    insp & palp - LLE                   Foot:                   

tenderness at 

the calcaneus                   2019                   None               



 

                    Full Exam - General                    Constitutional   

                 

general appearance                   Development:                   well 

developed                   08/15/2019                   None                

 

                    Full Exam - General                    Constitutional   

                 

general appearance                   Development:                   appears 

stated age                   08/15/2019                   None                

 

                    Full Exam - General                    Constitutional   

                 

general appearance                      Hygiene/Attention to Grooming:          

   

                    good hygiene                   08/15/2019                   

None           

    

 

                    Full Exam - General                    Eyes             

      

conjunctiva/eyelids                   Overall:                   conjunctiva 

clear                     08/15/2019                   None                

 

                    Full Exam - General                    Eyes             

      

conjunctiva/eyelids                   Overall:                   cornea clear   

                          08/15/2019                   None                

 

                    Full Exam - General                    Eyes             

      

conjunctiva/eyelids                   Overall:                   eyelids normal 

                          08/15/2019                   None                

 

                    Full Exam - General                    Eyes             

      pupils and 

irises                    Overall:                   pupils equal, round, 

reactive to light and accomodation                   08/15/2019                 

                                        None                

 

                    Full Exam - General                    Ears/Nose/Throat 

                  

otoscopic exam                   Overall:                   external auditory 

canals clear                   08/15/2019                   None                

 

                    Full Exam - General                    Ears/Nose/Throat 

                  

otoscopic exam                   Overall:                   tympanic membranes 

clear                     08/15/2019                   None                

 

                    Full Exam - General                    Ears/Nose/Throat 

                  

lips/teeth/gingiva                   Overall:                   benign lips     

                          08/15/2019                   None                

 

                    Full Exam - General                    Ears/Nose/Throat 

                  

lips/teeth/gingiva                   Overall:                   normal dentition

                          08/15/2019                   None                

 

                    Full Exam - General                    Ears/Nose/Throat 

                  

oral cavity/pharynx/larynx                    Overall:                   oral 

mucosa clear                   08/15/2019                   None                

 

                    Full Exam - General                    Ears/Nose/Throat 

                  

oral cavity/pharynx/larynx                    Overall:                   

oropharyngeal mucosa clear                   08/15/2019                   None  

             

 

                    Full Exam - General                    Ears/Nose/Throat 

                  

oral cavity/pharynx/larynx                    Overall:                   

hypopharynx benign                   08/15/2019                   None          

     

 

                    Full Exam - General                    Ears/Nose/Throat 

                  

oral cavity/pharynx/larynx                    Overall:                   no 

masses                    08/15/2019                   None                

 

                    Full Exam - General                    Respiratory      

             

auscultation                   Overall:                   breath sounds clear 

bilaterally                   08/15/2019                   None                

 

                    Full Exam - General                    Respiratory      

             

respiratory effort/rhythm                   Overall:                   no 

retractions                   08/15/2019                   None                

 

                    Full Exam - General                    Respiratory      

             

respiratory effort/rhythm                   Overall:                   normal 

rate                      08/15/2019                   None                

 

                    Full Exam - General                    Cardiovascular   

                

extremities                   Overall:                   no clubbing            

                          08/15/2019                   None                

 

                    Full Exam - General                    Cardiovascular   

                

auscultation of heart                   Overall:                   regular rate 

                          08/15/2019                   None                

 

                    Full Exam - General                    Cardiovascular   

                

auscultation of heart                   Overall:                   normal heart 

sounds                    08/15/2019                   None                

 

                    Full Exam - General                    Abdomen          

         abdominal 

exam                      Overall:                   no tenderness              

   

                          08/15/2019                   None                

 

                    Full Exam - General                    Abdomen          

         abdominal 

exam                      Overall:                   normal bowel sounds        

   

                          08/15/2019                   None                

 

                    Full Exam - General                    Lymphatic        

           neck 

nodes                     Overall:                   anterior cervical chain 

benign                    08/15/2019                   None                

 

                    Full Exam - General                    Lymphatic        

           neck 

nodes                     Overall:                   posterior cervical chain 

benign                    08/15/2019                   None                

 

                    Full Exam - General                    Musculoskeletal  

                 

spine, ribs and pelvis                   Overall:                   spine benign

                          08/15/2019                   None                

 

                    Full Exam - General                    Musculoskeletal  

                 

spine, ribs and pelvis                   Overall:                   sacroiliac 

joint benign                   08/15/2019                   None                

 

                    Full Exam - General                    Musculoskeletal  

                 

spine, ribs and pelvis                   Overall:                   good posture

                          08/15/2019                   None                

 

                    Full Exam - General                    Musculoskeletal  

                 

head and neck                   Overall:                   head atraumatic      

                          08/15/2019                   None                

 

                    Full Exam - General                    Musculoskeletal  

                 

head and neck                   Overall:                   cervical spine benign

                          08/15/2019                   None                

 

                    Full Exam - General                    Integument       

            

inspection of skin                   Overall:                   few scattered 

moles, no gross abnormalities                   08/15/2019                   

None                

 

                    Full Exam - General                    Neurologic       

            deep 

tendon reflexes                   Overall:                   deep tendon 

reflexes intact                   08/15/2019                   None             

  

 

                    Full Exam - General                    Neurologic       

            cranial 

nerves                    Overall:                   crainial nerves 2 - 12 

grossly intact                   08/15/2019                   None              

 

 

                    Full Exam - General                    Psychiatric      

             

orientation/consciousness                   Overall:                   oriented 

to person, place and time                   08/15/2019                   None   

            

 

                    Full Exam - General                    Psychiatric      

             mood 

and affect                   Overall:                   normal mood and affect  

                          08/15/2019                   None                



                                                                              



Procedures

          No Procedures data                                                    
               



Vital Signs

                      



                          Date                      Vital                

 

                          2019                                       Blood

 Pressure 1: 132/74 Code: 

8480-6                                                         BMI: 27.4 Code: 

85008-4                                                         Heart Rate 1: 66

bpm                                                         Height: 5'2"        

                                                            SpO2: 98%           

            

                                                            Weight: 150 lbs     

                           

  

 

                          08/15/2019                                       Blood

 Pressure 1: 104/60 Code: 

8480-6                                                         BMI: 27.5 Code: 

76339-3                                                         Heart Rate 1: 66

bpm                                                         Height: 5'2"        

                                                            SpO2: 98%           

            

                                                            Weight: 150 lbs 5 oz

                           

      



                                                                                
                 



Functional Status

          No Functional Status data                                             
            



History of Present Illness

                      



                    Symptom Name                   Status                   Resu

lt                  

                          Effective Date                   Notes                

 

                                   Location                   on the left       

            

2019                              None                

 

                                   Quality                   constant           

        

2019                              None                

 

                                        Onset and Resolution                   s

udden in onset       

                          2019                   None                

 

                                        Onset of Symptom                   2 wee

ks ago               

                          2019                   None                

 

                                        Frequency of Episodes                   

daily                

                          2019                   None                

 

                                        Pertinent Findings                   dec

reased range of 

motion                    2019                   None                

 

                                        Pertinent Findings                   [place]

ping                 

                          2019                   None                

 

                                        Pertinent Findings                   [place]

n with movement      

                          2019                   None                

 

                                        Pertinent Findings                   sti

ffness               

                          2019                   None                

 

                                   Quality                   intermittent       

            

08/15/2019                              None                

 

                                        Onset and Resolution                   o

ngoing               

                          08/15/2019                   None                

 

                                   Quality                   intermittent       

            

08/15/2019                              None                

 

                                        Onset and Resolution                   g

radual in onset      

                          08/15/2019                   None                

 

                                        Onset of Symptom                   2 wee

ks ago               

                          08/15/2019                   None                

 

                                        Pertinent Findings                   blo

ating                

                          08/15/2019                   hx of constipation       

          

 

                                        Onset of Symptom                   Denie

s _ years ago        

                          08/15/2019                   None                

 

                                        Pertinent Findings                   Den

ies nausea           

                          08/15/2019                   None                

 

                                        Pertinent Findings                   Den

ies decreased energy 

level                     08/15/2019                   None                



                                                                              



Advance Directives

          No Advance Directive data                                             
                      



Encounters

                      



                    Encounter                   Performer                   Loca

tion                

                          Codes                     Date                

 

                                                            74589 EST. PATIENT, 

LEVEL III

Diagnosis: Pain in left ankle and joints of left foot[ICD10: M25.572]

Diagnosis: Pain in left knee[ICD10: M25.562]                                    
                    [facility]       

            CPT-4: 

85434                                   2019                

 

                                                            (75959) PREV VISIT N

EW AGE 40-64

Diagnosis: Encounter for general adult medical examination without abnormal 
findings[ICD10: Z00.00]                                     [facility]                   CPT-4: 04541 

              

                                        08/15/2019                



                                                                                
                                                         



Plan of Care

                      



                    Planned Activity                   Notes                   C

odes                

                          Status                    Date                

 

                          Visit Plan:                    Left knee, ankle, foot 

pain - will send RX - if 

no improvement will refer to ortho - The pt is to use prn antiinflammatories to 
manage acute pain. The patient is to call the office if the pain is worsening or
does not improve.

                                                            2019          

  

   

 

                          Patient Education: Patient Medication Summary         

                          

                                        Completed                   2019  

              

 

                          Visit Plan:                    Well Adult - pt was cou

nseled about diet, 

exercise, and encouraged to follow a heart healthy diet and increase activity 
level. The patient was instructed to RTC yearly for well adult exams and PRN for
acute illnesses. The pt was also instructed to have yearly labs for check of 
cholesterol, thyroid, chem panel, CBC, and renal functioning. Post-surgical 
menopause - I have advised the pt that we will look for her medications from [facility]
to find out what her depo dose is so I can wean her off of it. Anxiety - the 
patient has uncontrolled anxiety and will benefit from an SSRI on a daily basis 
to attempt control of the symptoms of anxiety (tachycardia, overwhelming 
sensations, stress, insomnia, etc). stop clonidine due to hypotension

                                                            08/15/2019          

  

   

 

                          Patient Education: Patient Medication Summary         

                          

                                        Completed                   08/15/2019  

              



                                                                                
                                     



Instructions

                      



                                        Comment                

 

                                                            . Well Adult - pt wa

s counseled about diet, exercise, and 

encouraged to follow a heart healthy diet and increase activity level.  The 
patient was instructed to RTC yearly for well adult exams and PRN for acute 
illnesses.  The pt was also instructed to have yearly labs for check of 
cholesterol, thyroid, chem panel, CBC, and renal functioning.



Post-surgical menopause - I have advised the pt that we will look for her 
medications from [facility] to find out what her depo dose is so I can wean her off of 
it.



Anxiety - the patient has uncontrolled anxiety and will benefit from an SSRI on 
a daily basis to attempt control of the symptoms of anxiety (tachycardia, 
overwhelming sensations, stress, insomnia, etc). 

stop clonidine due to hypotension                                  

 

                                                            . Left knee, ankle, 

foot pain - will send RX - if no 

improvement will refer to ortho - The pt is to use prn antiinflammatories to 
manage acute pain. The patient is to call the office if the pain is worsening or
does not improve.

## 2020-05-12 NOTE — OPERATIVE REPORT
DATE OF SERVICE:  05/12/2020



PREOPERATIVE DIAGNOSIS:

History of polyps.



POSTOPERATIVE DIAGNOSIS:

Normal colon.



PROCEDURE:

Colonoscopy.



SURGEON:

Kim Ray DO



ANESTHESIA:

Per CRNA.



ESTIMATED BLOOD LOSS:

None.



COMPLICATIONS:

None.



INDICATIONS:

The patient is a 49-year-old female with history of colon polyps.  She

understands risks and benefits of procedure and wished to proceed with

procedure.  Consent was signed in the chart.



DESCRIPTION OF PROCEDURE:

The patient was taken to the endoscopy suite, placed in left lateral recumbent

position.  Timeout was performed.  Digital rectal exam was performed.  There

were no palpable polyps, masses or ulcerations.  Scope was inserted in the

rectum and advanced all the way to cecum with minimal difficulty.  Prep was

adequate.  Scope was then slowly retracted back.  There were no polyps, masses

or ulcerations in the cecum, ascending, transverse and descending and sigmoid

colon.  The tattoo previously placed was identified.  No evidence of any

recurrence polyp.  Scope was continuously retracted back in the rectum, it was

also retroflexed noting no other pathology.  Scope was returned to its normal

position, slowly withdrawn until completely removed.  The patient tolerated

procedure well without any complications.  She was taken to recovery room in

stable condition.



RECOMMENDATIONS:

The patient will need repeat colonoscopy in 5 years.  Any issues before that be

seen at that time.





Job ID: [id number]

DocumentID: [id number]

Dictated Date:  05/12/2020 09:11:34

Transcription Date: 05/12/2020 12:39:00

Dictated By: KIM RAY DO

## 2020-05-12 NOTE — DISCHARGE INST-SIMPLE/STANDARD
Discharge Inst-Standard


Patient Instructions/Follow Up


Plan of Care/Instructions/FU:  


5 years for repeat colonoscopy.  Any issues before that be seen at that


time.


Activity as Tolerated:  Yes


Discharge Diet:  Regular Diet











KIM WAYNE DO              May 12, 2020 09:09

## 2020-05-12 NOTE — XMS REPORT
Continuity of Care Document

                             Created on: 2020



MIO CAPPS

External Reference #: [id number]

: 1970

Sex: Female



Demographics





                          Address                   [address]

[place] [address]

 

                          Home Phone                [phone] x

 

                          Preferred Language        Unknown

 

                          Marital Status            Unknown

 

                          Yarsanism Affiliation     Unknown

 

                          Race                      Unknown

 

                          Ethnic Group              Unknown





Author





                          Organization              Unknown

 

                          Address                   Unknown

 

                          Phone                     Unavailable



              



Allergies

      



             Active           Description           Code           Type         

  Severity   

                Reaction           Onset           Reported/Identified          

 

Relationship to Patient                 Clinical Status        

 

                Yes             No Known Drug Allergies           [id number]    

       Drug 

Allergy           Unknown           N/A                             2018  

      

                                                             

 

             Yes           Penicillins           [id number]           Drug Aller

gy           

Unknown           FROM CHILDHOOD                           2019           

    

                                                 



                    



Medications

      



There is no data.                  



Problems

      



             Date Dx Coded           Attending           Type           Code    

       

Diagnosis                               Diagnosed By        

 

                1542           KIM WAYNE DO           Ot            

  Z01.818          

                          ENCOUNTER FOR OTHER PREPROCEDURAL EXAMIN              

      

 

             1542           KIM WAYNE DO           Ot           Z11.

59           

ENCOUNTER FOR SCREENING FOR OTHER VIRAL                     

 

                1542           KIM WAYNE DO           Ot            

  Z86.010          

                          PERSONAL HISTORY OF COLONIC POLYPS                    

 

                2018           JAKE HERBERT           Ot           

   D72.829         

                          ELEVATED WHITE BLOOD CELL COUNT, UNSPECI              

      

 

             2018           JAKE HERBERT           Ot           J02

.0           

STREPTOCOCCAL PHARYNGITIS                        

 

                2018           JAKE HERBERT           Ot           

   J45.909         

                          UNSPECIFIED ASTHMA, UNCOMPLICATED                    

 

             2018           JAKE HERBERT           Ot           M54

.6           

PAIN IN THORACIC SPINE                           

 

             2018           JAKE HERBERT           Ot           N39

.0           

URINARY TRACT INFECTION, SITE NOT SPECIF                    

 

                2018           JAKE HERBERT           Ot           

   Z90.49          

                          ACQUIRED ABSENCE OF OTHER SPECIFIED PART              

      

 

                2018           JAKE HERBERT           Ot           

   Z90.710         

                          ACQUIRED ABSENCE OF BOTH CERVIX AND UTER              

      

 

                2018           JAKE HERBERT           Ot           

   Z90.89          

                          ACQUIRED ABSENCE OF OTHER ORGANS                    

 

                2019           KIM WAYNE DO           Ot            

  Z01.818          

                          ENCOUNTER FOR OTHER PREPROCEDURAL EXAMIN              

      

 

                2019           KIM WAYNE DO           Ot            

  Z01.818          

                          ENCOUNTER FOR OTHER PREPROCEDURAL EXAMIN              

      

 

             2019           KIM WAYNE DO           Ot           F41.

9           

ANXIETY DISORDER, UNSPECIFIED                    

 

                2019           KIM WAYNE DO           Ot            

  J45.909          

                          UNSPECIFIED ASTHMA, UNCOMPLICATED                    

 

             2019           KIM WAYNE DO           Ot           K21.

9           

GASTRO-ESOPHAGEAL REFLUX DISEASE WITHOUT                    

 

             2019           KIM WAYNE DO           Ot           K51.

40           

INFLAMMATORY POLYPS OF COLON WITHOUT COM                    

 

             2019           KIM WAYNE DO           Ot           K57.

30           

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                    

 

             2019           KIM WAYNE DO           Ot           K62.

1           

RECTAL POLYP                                     

 

             2019           KIM WAYNE DO           Ot           K92.

1           

MELENA                                           

 

                2019           KIM WAYNE DO           Ot            

  Z79.899          

                          OTHER LONG TERM (CURRENT) DRUG THERAPY                

    

 

             2019           KIM WAYNE DO           Ot           Z82.

49           

FAMILY HX OF ISCHEM HEART DIS AND OTH DI                    

 

             2019           KIM WAYNE DO           Ot           Z88.

0           

ALLERGY STATUS TO PENICILLIN                     

 

                2019           KIM WAYNE DO           Ot            

  Z90.710          

                          ACQUIRED ABSENCE OF BOTH CERVIX AND UTER              

      

 

             2019           KIM WAYNE DO           Ot           Z90.

89           

ACQUIRED ABSENCE OF OTHER ORGANS                    

 

             2019           KIM WAYNE DO           Ot           F41.

9           

ANXIETY DISORDER, UNSPECIFIED                    

 

                2019           KIM WAYNE DO           Ot            

  J45.909          

                          UNSPECIFIED ASTHMA, UNCOMPLICATED                    

 

             2019           KIM WAYNE DO           Ot           K21.

9           

GASTRO-ESOPHAGEAL REFLUX DISEASE WITHOUT                    

 

             2019           KIM WAYNE DO           Ot           K51.

40           

INFLAMMATORY POLYPS OF COLON WITHOUT COM                    

 

             2019           KIM WAYNE DO           Ot           K57.

30           

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                    

 

             2019           KIM WAYNE DO           Ot           K62.

1           

RECTAL POLYP                                     

 

             2019           KIM WAYNE DO           Ot           K92.

1           

MELENA                                           

 

                2019           KIM WAYNE DO           Ot            

  Z79.899          

                          OTHER LONG TERM (CURRENT) DRUG THERAPY                

    

 

             2019           KIM WAYNE DO           Ot           Z82.

49           

FAMILY HX OF ISCHEM HEART DIS AND OTH DI                    

 

             2019           KIM WAYNE DO           Ot           Z88.

0           

ALLERGY STATUS TO PENICILLIN                     

 

                2019           KIM WAYNE DO           Ot            

  Z90.710          

                          ACQUIRED ABSENCE OF BOTH CERVIX AND UTER              

      

 

             2019           TONE MONTOYAKIM BRANDO           Ot           Z90.

89           

ACQUIRED ABSENCE OF OTHER ORGANS                    

 

                2019           ANNA CASTRO, TREVIN ELLIS           Ot          

    M65.872        

                          OTHER SYNOVITIS AND TENOSYNOVITIS, LEFT               

      

 

                2019           ANNA CASTRO, TREVIN ELLIS           Ot          

    M67.472        

                          GANGLION, LEFT ANKLE AND FOOT                    

 

                2019           ANNA CASTRO, TREVIN ELILS           Ot          

    S86.012D       

                          STRAIN OF LEFT ACHILLES TENDON, SUBSEQUE              

      

 

                2019           TREVIN CASTILLO MD           Ot          

    X58.XXXD       

                          EXPOSURE TO OTHER SPECIFIED FACTORS, SUB              

      

 

                2019           ANNA CASTRO, TREVIN ELLIS           Ot          

    M65.872        

                          OTHER SYNOVITIS AND TENOSYNOVITIS, LEFT               

      

 

                2019           ANNA CASTRO, TREVIN ELLIS           Ot          

    M67.472        

                          GANGLION, LEFT ANKLE AND FOOT                    

 

                2019           ANNA CASTRO, TREVIN ELLIS           Ot          

    S86.012D       

                          STRAIN OF LEFT ACHILLES TENDON, SUBSEQUE              

      

 

                2019           TREVIN CASTILLO MD           Ot          

    X58.XXXD       

                          EXPOSURE TO OTHER SPECIFIED FACTORS, SUB              

      

 

                2020           TREVIN CASTILLO MD           Ot          

    M65.872        

                          OTHER SYNOVITIS AND TENOSYNOVITIS, LEFT               

      

 

                2020           ANNA CASTRO, TREVIN ELLIS           Ot          

    M67.472        

                          GANGLION, LEFT ANKLE AND FOOT                    

 

                2020           TREVIN CASTILLO MD           Ot          

    S86.012D       

                          STRAIN OF LEFT ACHILLES TENDON, SUBSEQUE              

      

 

                2020           TREVIN CASTILLO MD           Ot          

    X58.XXXD       

                          EXPOSURE TO OTHER SPECIFIED FACTORS, SUB              

      

 

             2020                        W            N95.1           [place]

pausal and 

female climacteric states               Yesica Crump        

 

             2020                        W            Z01.419           En

counter for 

gynecological examination (general) (routine) without abnormal findings         
                                        Bree Oleary        

 

             2020                        W            Z01.419           En

counter for 

gynecological examination (general) (routine) without abnormal findings         
                                        Bree Oleary        

 

             2020                        W            Z01.419           En

counter for 

gynecological examination (general) (routine) without abnormal findings         
                                        Bree Oleary        

 

             2020                        W            N95.1           [place]

pausal and 

female climacteric states               Yesica Crump        



                                                                                
                                           



Procedures

      



There is no data.                  



Results

      



                    Test                Result              Range        

 

                                        Complete blood count (CBC) with automate

d white blood cell (WBC) differential - 

18 19:20         

 

                          Blood leukocytes automated count (number/volume)      

     21.5 10*3/uL         

                                        4.3-11.0        

 

                          Blood erythrocytes automated count (number/volume)    

       4.46 10*6/uL       

                                        4.35-5.85        

 

                    Venous blood hemoglobin measurement (mass/volume)           

13.4 g/dL           

11.5-16.0        

 

                    Blood hematocrit (volume fraction)           39 %           

     35-52        

 

                    Automated erythrocyte mean corpuscular volume           86 [

foz_us]           

80-99        

 

                                        Automated erythrocyte mean corpuscular h

emoglobin (mass per erythrocyte)        

                          30 pg                     25-34        

 

                                        Automated erythrocyte mean corpuscular h

emoglobin concentration measurement 

(mass/volume)             35 g/dL                   32-36        

 

                    Automated erythrocyte distribution width ratio           13.

0 %              10.0-

14.5        

 

                    Automated blood platelet count (count/volume)           293 

10*3/uL           

130-400        

 

                          Automated blood platelet mean volume measurement      

     10.1 [foz_us]        

                                        7.4-10.4        

 

                    Automated blood neutrophils/100 leukocytes           81 %   

             42-75       

 

 

                    Automated blood lymphocytes/100 leukocytes           11 %   

             12-44       

 

 

                    Blood monocytes/100 leukocytes           5 %                

 0-12        

 

                    Automated blood eosinophils/100 leukocytes           2 %    

             0-10        

 

                    Automated blood basophils/100 leukocytes           1 %      

           0-10        

 

                    Blood neutrophils automated count (number/volume)           

17.5 10*3           

1.8-7.8        

 

                    Blood lymphocytes automated count (number/volume)           

2.4 10*3            

1.0-4.0        

 

                    Blood monocytes automated count (number/volume)           1.

1 10*3            

0.0-1.0        

 

                    Automated eosinophil count           0.3 10*3/uL           0

.0-0.3        

 

                    Automated blood basophil count (count/volume)           0.2 

10*3/uL           

0.0-0.1        

 

                                        Blood manual differential performed dete

ction - 18 19:20         

 

                    Blood monocytes/100 leukocytes           1 %                

 NRG        

 

                    Manual blood segmented neutrophils/100 leukocytes           

80 %                NRG  

      

 

                    Blood band neutrophils/100 leukocytes           1 %         

        NRG        

 

                    Manual blood lymphocytes/100 leukocytes           15 %      

          NRG        

 

                    Manual eosinophils/100 leukocytes in nose           3 %     

            NRG        

 

                    Manual blood basophils/100 leukocytes           0 %         

        NRG        

 

                    Blood erythrocyte morphology finding identification         

  NORMAL              

NRG        

 

                                        Comprehensive metabolic panel - 18

 19:20         

 

                          Serum or plasma sodium measurement (moles/volume)     

      140 mmol/L          

                                        135-145        

 

                          Serum or plasma potassium measurement (moles/volume)  

         3.2 mmol/L       

                                        3.6-5.0        

 

                          Serum or plasma chloride measurement (moles/volume)   

        98 mmol/L         

                                                

 

                    Carbon dioxide           24 mmol/L           21-32        

 

                          Serum or plasma anion gap determination (moles/volume)

           18 mmol/L      

                                        5-14        

 

                          Serum or plasma urea nitrogen measurement (mass/volume

)           10 mg/dL      

                                        7-18        

 

                          Serum or plasma creatinine measurement (mass/volume)  

         0.73 mg/dL       

                                        0.60-1.30        

 

                    Serum or plasma urea nitrogen/creatinine mass ratio         

  14                  NRG 

       

 

                                        Serum or plasma creatinine measurement w

ith calculation of estimated glomerular 

filtration rate           >                         NRG        

 

                    Serum or plasma glucose measurement (mass/volume)           

133 mg/dL           

        

 

                    Serum or plasma calcium measurement (mass/volume)           

9.6 mg/dL           

8.5-10.1        

 

                          Serum or plasma total bilirubin measurement (mass/volu

me)           0.5 mg/dL   

                                        0.1-1.0        

 

                                        Serum or plasma alkaline phosphatase angeline

surement (enzymatic activity/volume)    

                          161 U/L                           

 

                                        Serum or plasma aspartate aminotransfera

se measurement (enzymatic 

activity/volume)           20 U/L                    5-34        

 

                                        Serum or plasma alanine aminotransferase

 measurement (enzymatic activity/volume)

                          45 U/L                    0-55        

 

                    Serum or plasma protein measurement (mass/volume)           

8.6 g/dL            

6.4-8.2        

 

                    Serum or plasma albumin measurement (mass/volume)           

4.1 g/dL            

3.2-4.5        

 

                                        Lipase - 18 19:20         

 

                    Lipase              34 U/L              8-78        

 

                                        Complete urinalysis with reflex to cultu

re - 18 19:25         

 

                    Urine color determination           YELLOW              NRG 

       

 

                    Urine clarity determination           SLIGHTLY CLOUDY       

     NRG        

 

                    Urine pH measurement by test strip           5              

     5-9        

 

                    Specific gravity of urine by test strip           1.015     

          1.016-1.022  

      

 

                    Urine protein assay by test strip, semi-quantitative        

   2+                  

NEGATIVE        

 

                    Urine glucose detection by automated test strip           NE

GATIVE            

NEGATIVE        

 

                          Erythrocytes detection in urine sediment by light micr

oscopy           3+       

                                        NEGATIVE        

 

                    Urine ketones detection by automated test strip           3+

                  NEGATIVE

        

 

                    Urine nitrite detection by test strip           NEGATIVE    

        NEGATIVE    

    

 

                    Urine total bilirubin detection by test strip           NEGA

TIVE            

NEGATIVE        

 

                          Urine urobilinogen measurement by automated test strip

 (mass/volume)           

NORMAL                                  NORMAL        

 

                    Urine leukocyte esterase detection by dipstick           2+ 

                 NEGATIVE 

       

 

                                        Automated urine sediment erythrocyte cou

nt by microscopy (number/high power 

field)                     [HPF]                    NRG        

 

                                        Automated urine sediment leukocyte count

 by microscopy (number/high power field)

                           [HPF]                    NRG        

 

                          Bacteria detection in urine sediment by light microsco

py           TRACE        

                                        NRG        

 

                                        Squamous epithelial cells detection in u

rine sediment by light microscopy       

                          10-25                     NRG        

 

                          Crystals detection in urine sediment by light microsco

py           NONE         

                                        NRG        

 

                    Casts detection in urine sediment by light microscopy       

    NONE                

NRG        

 

                          Mucus detection in urine sediment by light microscopy 

          MODERATE        

                                        NRG        

 

                    Complete urinalysis with reflex to culture           YES    

             NRG        

 

                    Yeast detection in urine sediment by light microscopy       

    FEW                 

NRG        

 

                                        Urine beta human chorionic gonadotropin 

(hCG) measurement - 18 19:25      

   

 

                          Urine beta human chorionic gonadotropin (hCG) measurem

ent           NEGATIVE    

                                        NEGATIVE        

 

                                        Bacterial urine culture - 18 19:25

         

 

                    Bacterial urine culture           [id number]            NRG   

     

 

                    COLONY COUNT           10,000/ML - 100,000/ML            NRG

        

 

                                        Blood lactic acid measurement (moles/vol

ume) - 18 20:10         

 

                    Blood lactic acid measurement (moles/volume)           1.08 

mmol/L           

0.50-2.00        

 

                                        Bacterial blood culture - 18 20:10

         

 

                    QUANTITY OF GROWTH           .                   NRG        

 

                    Bacterial blood culture           SEE COMMEN            NRG 

       

 

                                        Bacterial blood culture - 18 20:36

         

 

                    Bacterial blood culture           NG                  NRG   

     

 

                                        Streptococcus pyogenes antigen detection

 - 18 20:38         

 

                    Streptococcus pyogenes antigen detection           NEGATIVE 

           NEGATIVE 

       

 

                                        Bacterial throat culture - 18 20:3

8         

 

                    Bacterial throat culture           NBS                 NRG  

      

 

                                        CMP - 18 11:54         

 

                    GLUCOSE             77 mg/dL            65-99        

 

                    UREA NITROGEN (BUN)           15 mg/dL            7-25      

  

 

                    CREATININE           0.64 mg/dL           0.50-1.10        

 

                    eGFR NON-AFR. AMERICAN           105 mL/min/1.73m2          

 > OR = 60        

 

                    eGFR            121 mL/min/1.73m2           

> OR = 60        

 

                    BUN/CREATININE RATIO           NOT APPLICABLE (calc)        

   6-22        

 

                    SODIUM              141 mmol/L           135-146        

 

                    POTASSIUM           4.3 mmol/L           3.5-5.3        

 

                    CHLORIDE            105 mmol/L                   

 

                    CARBON DIOXIDE           30 mmol/L           20-31        

 

                    CALCIUM             9.3 mg/dL           8.6-10.2        

 

                    PROTEIN, TOTAL           7.4 g/dL            6.1-8.1        

 

                    ALBUMIN             4.7 g/dL            3.6-5.1        

 

                    GLOBULIN            2.7 g/dL (calc)           1.9-3.7       

 

 

                    ALBUMIN/GLOBULIN RATIO           1.7 (calc)           1.0-2.

5        

 

                    BILIRUBIN, TOTAL           0.3 mg/dL           0.2-1.2      

  

 

                    ALKALINE PHOSPHATASE           80 U/L                 

     

 

                    AST                 26 U/L              10-35        

 

                    ALT                 30 U/L              6-29        

 

                                        TSH - 18 08:28         

 

                    TSH                 1.33 mIU/L           NRG        

 

                                        Coronavirus SARS-CoV-2 SO 2019 - 

0 13:37         

 

                    Coronavirus Ab [Units/volume] in Serum           Negative   

         Negative   

     



                                            



Encounters

      



                ACCT No.           Visit Date/Time           Discharge          

 Status         

             Pt. Type           Provider           Facility           Loc./Unit 

          

Complaint        

 

                [id number]           2019 12:57:00           2019 23:59:

00           DIS

                Outpatient           BERNADINE PEREZ                          

         

                                                 

 

                [id number]           2017 11:00:00           2017 23:59:

00           DIS

                Outpatient           CATRACHITA CHAMORRO                          

         

                                                 

 

                [id number]           06/15/2017 09:23:00           06/15/2017 23:59:

00           DIS

                Outpatient           CATRACHITA CHAMORRO                          

         

                                                 

 

                [id number]           2019 09:40:00           2019 23:59:5

9           CLS 

                Outpatient           BERNADINE PEREZ                        

   [facility]                                   

 

             [id number]           2018 08:40:00                              

       Document

 Registration                                                                   

 

 

             [id number]           2018 11:20:00                              

       Document

 Registration                                                                   

 

 

                [id number]            2019 11:31:59           2019 23:59:5

9           CLS  

             Outpatient                                                         

  

 

                    [id number]           2020 06:30:00            15:43:00        

                DIS             Outpatient           KIM WAYNE DO         

  [facility]           PREOP                     COLONOSCOPY        

 

                    [id number]           2020 09:00:00            23:59:59        

                CLS             Preadmit           KIM WAYNE DO           

[facility]           SDC                       HX POLYPS        

 

                    [id number]           2019 08:39:00            23:59:59        

                CLS             Outpatient           ANNA CASTRO, TREVIN ELLIS       

    [facility]           RAD                       STRAIN OF LT ACHILLES 

TENDON      

  

 

                    [id number]           2019 06:42:00            09:00:00        

                DIS             Outpatient           KIM WAYNE DO         

  [facility]           ENDO                      BLOOD IN STOOLS        

 

                    [id number]           2019 06:12:00           

019 09:22:00        

                DIS             Outpatient           KIM WAYNE DO         

  [facility]           PREOP                     BLOOD IN STOOLS        

 

                    [id number]           2018 19:11:00           

018 21:20:00        

                DIS             Emergency           JAKE HERBERT APRN         

  [facility]           ER                        BACK PAIN        

 

                    [id number]           2017 11:37:00           

017 23:59:59        

                CLS             Preadmit           CATRACHITA CHAMORROP     

      [facility] 

[facility]           RAD                       SCREENING      

  

 

             [id number]           2020 09:20:00                        P

EN           

Preadmit                  KIM WAYNE DO           [facility]

                          ENDO                      HX POLYPS

## 2020-05-12 NOTE — ANESTHESIA-GENERAL POST-OP
MAC


Patient Condition


Mental Status/LOC:  Same as Preop


Cardiovascular:  Satisfactory


Nausea/Vomiting:  Absent


Respiratory:  Satisfactory


Pain:  Controlled


Complications:  Absent





Post Op Complications


Complications


None





Follow Up Care/Instructions


Patient Instructions


None needed.





Anesthesiology Discharge Order


Discharge Order


Patient is doing well, no complaints, stable vital signs, no apparent adverse 

anesthesia problems.   


No complications reported per nursing.











SHARLA DILLON CRNA              May 12, 2020 13:44

## 2021-12-06 ENCOUNTER — HOSPITAL ENCOUNTER (OUTPATIENT)
Dept: HOSPITAL 75 - RAD | Age: 51
End: 2021-12-06
Attending: NURSE PRACTITIONER
Payer: COMMERCIAL

## 2021-12-06 DIAGNOSIS — R05.3: Primary | ICD-10-CM

## 2021-12-06 PROCEDURE — 71046 X-RAY EXAM CHEST 2 VIEWS: CPT

## 2021-12-06 NOTE — DIAGNOSTIC IMAGING REPORT
PATIENT HISTORY: 

Chronic cough. 



TECHNIQUE: 

Two views of the chest.



COMPARISON: 

01/20/2018.



FINDINGS:

The lung volumes are normal. No focal consolidation is seen. No

large pleural effusion or pneumothorax is seen. The

cardiomediastinal silhouette is normal in size and contour. No

acute osseous abnormality is seen.



IMPRESSION:

No acute pulmonary abnormality seen.



Dictated by: 



  Dictated on workstation # [id number]

## 2021-12-23 ENCOUNTER — HOSPITAL ENCOUNTER (OUTPATIENT)
Dept: HOSPITAL 75 - PREOP | Age: 51
Discharge: HOME | End: 2021-12-23
Attending: SURGERY
Payer: COMMERCIAL

## 2021-12-23 VITALS — BODY MASS INDEX: 29.08 KG/M2 | WEIGHT: 160.06 LBS | HEIGHT: 62.01 IN

## 2021-12-23 DIAGNOSIS — R13.10: ICD-10-CM

## 2021-12-23 DIAGNOSIS — Z01.812: Primary | ICD-10-CM

## 2021-12-23 DIAGNOSIS — Z20.822: ICD-10-CM

## 2021-12-23 PROCEDURE — 87635 SARS-COV-2 COVID-19 AMP PRB: CPT

## 2021-12-28 ENCOUNTER — HOSPITAL ENCOUNTER (OUTPATIENT)
Dept: HOSPITAL 75 - ENDO | Age: 51
End: 2021-12-28
Attending: SURGERY
Payer: COMMERCIAL

## 2021-12-28 VITALS — SYSTOLIC BLOOD PRESSURE: 140 MMHG | DIASTOLIC BLOOD PRESSURE: 72 MMHG

## 2021-12-28 VITALS — DIASTOLIC BLOOD PRESSURE: 62 MMHG | SYSTOLIC BLOOD PRESSURE: 127 MMHG

## 2021-12-28 VITALS — WEIGHT: 160.06 LBS | BODY MASS INDEX: 29.08 KG/M2 | HEIGHT: 62.01 IN

## 2021-12-28 DIAGNOSIS — K21.00: Primary | ICD-10-CM

## 2021-12-28 DIAGNOSIS — Z83.3: ICD-10-CM

## 2021-12-28 DIAGNOSIS — J45.909: ICD-10-CM

## 2021-12-28 DIAGNOSIS — Z79.899: ICD-10-CM

## 2021-12-28 DIAGNOSIS — Z90.710: ICD-10-CM

## 2021-12-28 DIAGNOSIS — F41.9: ICD-10-CM

## 2021-12-28 DIAGNOSIS — E78.00: ICD-10-CM

## 2021-12-28 PROCEDURE — 88305 TISSUE EXAM BY PATHOLOGIST: CPT

## 2021-12-28 NOTE — XMS REPORT
CCD document using C-CDA

                             Created on: 2021



AyalaRosibel vegas

External Reference #: [id number]

: 1970

Sex: Female



Demographics





                          Address                   [address]

[place] [id number]

 

                          Home Phone                [phone]

 

                          Preferred Language        Unknown

 

                          Marital Status            Unknown

 

                          Lutheran Affiliation     Unknown

 

                          Race                      White

 

                          Ethnic Group              Not  or 





Author





                          Author                    Rosibel Crump

 

                          Organization              Yesica Crump MD, KAREN

 

                          Address                   [address] [place]  [address]



 

                          Phone                     [phone]







Care Team Providers





                    Care Team Member Name Role                Phone

 

                    Yesica Crump     PP                  Unavailable

 

                          CCM                       Unavailable







Summary Purpose

Interface Exchange



Insurance Providers





             Payer name   Policy type / Coverage type Covered party ID Effective

 Begin Date 

Effective End Date

 

             [facility] Commercial Insurance [id number] 

Unknown      

Unknown







Family history





Mother



                          Diagnosis                 Age At Onset

 

                          Diabetes mellitus Type 2  Unknown

 

                          Alcoholism                Unknown

 

                          Depression                Unknown







Father



                          Diagnosis                 Age At Onset

 

                          Diabetes mellitus Type 2  Unknown







Social History





                Social History Element Codes           Description     Effective

 Dates

 

                    Marital status      Unknown             

Ha                                   08/15/2019

 

                Number of children Unknown         2               08/15/2019

 

                    Employment          Unknown             Currently employed

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

 

                Tobacco history SNOMED CT: 97071041 Current some days smoker 08/

15/2019

 

                Alcohol history SNOMED CT: 228991898 Never drinks alcohol 08/15/

2019







Allergies, Adverse Reactions, Alerts





           Substance  Reaction   Codes      Entered Date Inactivated Date Status

 

           Penicillin            Unknown    08/15/2019 No Inactive Date Active







Problems





                Condition       Codes           Effective Dates Condition Status

 

                          Acute bronchitis          ICD-10: J20.9

ICD-9: 466.0              2019                Active

 

                          Laryngitis                ICD-10: J04.0

ICD-9: 464.00             2021                Active

 

                          Other allergic rhinitis   ICD-10: J30.89

ICD-9: 477.8              2020                Active

 

                          Slow transit constipation ICD-10: K59.01

ICD-9: 564.01             2021                Active

 

                          Cough                     ICD-10: R05.9

ICD-9: 786.2              10/25/2021                Active

 

                          Encounter for screening mammogram for malignant neopla

sm of breast ICD-10: 

Z12.31

ICD-9: V76.12             11/10/2020                Active

 

                          Generalized anxiety disorder ICD-10: F41.9

ICD-9: 300.00             10/25/2021                Active

 

                          Menopausal and female climacteric states ICD-10: N95.1

ICD-9: 627.2              08/15/2019                Active

 

                          Encounter for general adult medical examination withou

t abnormal findings ICD-

10: Z00.00

ICD-9: V70.0              08/15/2019                Active

 

                          Screening, lipid          ICD-10: Z13.220

ICD-9: V77.91             10/22/2020                Active

 

                          Migraine without status migrainosus, not intractable, 

unspecified migraine type 

ICD-10: G43.909

ICD-9: 346.90             2020                Active

 

                          Headache                  ICD-10: R51

ICD-9: 784.0              2020                Active

 

                          Other acute sinusitis     ICD-10: J01.80

ICD-9: 461.8              2020                Active

 

                          Generalized anxiety disorder ICD-10: F41.1

ICD-9: 300.00             08/15/2019                Active

 

                                        Encounter for gynecological examination 

(general) (routine) without abnormal 

findings                                ICD-10: Z01.419

ICD-9: V72.31             2020                Active

 

                          Cough                     ICD-10: R05

ICD-9: 786.2              2019                Active

 

                          Body aches                ICD-10: R52

ICD-9: 780.96             2019                Active

 

                          Fever                     ICD-10: R50.9

ICD-9: 780.60             2019                Active

 

                          Pain in left ankle and joints of left foot ICD-10: M25

.572

ICD-9: 719.47             2019                Active

 

                          Pain in left knee         ICD-10: M25.562

ICD-9: 719.46             2019                Active







Medications





          Medication Codes     Instructions Start Date Stop Date Status    Fill 

Instructions

 

                    atorvastatin 20 mg tablet RxNorm: 591384      Take 1 Tablet(

s) Oral every night at 

bedtime         2021      Active           

 

                Miralax 17 gram/dose oral powder RxNorm: 212318  Take 1 scoop Or

al every day 

2021          12/15/2021          Active               

 

                    Allegra Allergy 180 mg tablet RxNorm: 594154      Take 1 Tab

let(s) Oral every day for

first 5 days take twice daily 2021      No Stop Date    Active           

 

                    omeprazole 20 mg tablet,delayed release RxNorm: 750344      

Take 1 Tablet(s) Oral 

every day       2021      Active           

 

                prednisone 20 mg tablet RxNorm: 449038  Take 2 Tablet(s) Oral ev

estevan day 

2021          Inactive             

 

                    azithromycin 250 mg tablet RxNorm: 174214      Take 1 Tablet

(s) Oral every day take 2

tablets day 1, then take 1 tablet daily on days 2-5 2021

21          

Inactive                                Please disregard Cefdinir RX.

 

                cefdinir 300 mg capsule RxNorm: 120590  Take 1 Capsule(s) Oral t

wo times a day 

2021          Inactive             

 

                    azithromycin 250 mg tablet RxNorm: 643789      Take 1 Tablet

(s) Oral every day take 2

tablets day 1, then take 1 tablet daily on days 2-5 2021

21          

Inactive                                Please disregard Cefdinir RX.

 

                    promethazine 6.25 mg-codeine 10 mg/5 mL syrup RxNorm: 493287

      Take 5 

Milliliter(s) Oral every 4-6 hours as needed cough 10/25/2021          10/25/202

1          

Inactive                                 

 

                prednisone 20 mg tablet RxNorm: 538905  Take 2 Tablet(s) Oral ev

estevan day 

10/25/2021          10/29/2021          Inactive             

 

                    promethazine 6.25 mg-codeine 10 mg/5 mL syrup RxNorm: 878730

      Take 5 

Milliliter(s) Oral every 4-6 hours as needed cough 10/25/2021          11/03/202

1          

Inactive                                 

 

                    Kenalog 40 mg/mL suspension for injection RxNorm: 0856540   

  Take 1 Milliliter(s) 

Injection       10/25/2021      10/25/2021      Inactive         

 

                    alprazolam 0.5 mg tablet RxNorm: 228204      Take 1 Tablet(s

) Oral two times a day as

needed FOR ANXIETY 10/15/2021      2021      Inactive         

 

                    alprazolam 0.5 mg tablet RxNorm: 533973      1 Tablet(s) Ora

l two times a day as 

needed anxiety  2021      Inactive         

 

                    Depo-Estradiol 5 mg/mL intramuscular oil RxNorm: 549930     

 0.75 Milliliter(s) 

Intramuscular   2021      Inactive         

 

                    alprazolam 0.5 mg tablet RxNorm: 785211      1 Tablet(s) Ora

l two times a day as 

needed anxiety  2021      Inactive         

 

                    Depo-Estradiol 5 mg/mL intramuscular oil RxNorm: 375395     

 0.75 Milliliter(s) 

Intramuscular   2021      Inactive         

 

                    alprazolam 0.5 mg tablet RxNorm: 121473      1 Tablet(s) Ora

l two times a day as 

needed anxiety  2021      Inactive         

 

                    alprazolam 0.5 mg tablet RxNorm: 959009      1 Tablet(s) Ora

l two times a day as 

needed anxiety  04/15/2021      2021      Inactive         

 

                    Depo-Provera 150 mg/mL intramuscular suspension RxNorm: 1000

128     Milliliter(s) 

Intramuscular   2021      Inactive         

 

                    escitalopram 10 mg tablet RxNorm: 507766      TAKE ONE TABLE

T BY MOUTH EVERY NIGHT AT

BEDTIME Tablet(s) Oral 2021      Inactive         

 

                    alprazolam 0.5 mg tablet RxNorm: 348902      1 Tablet(s) Ora

l two times a day as 

needed anxiety  2021      Inactive         

 

                    escitalopram 5 mg tablet RxNorm: 737261      TAKE ONE TABLET

 BY MOUTH EVERY NIGHT AT 

BEDTIME         02/15/2021      2021      Inactive         

 

                    alprazolam 0.5 mg tablet RxNorm: 787351      1 Tablet(s) Ora

l two times a day as 

needed anxiety  02/15/2021      2021      Inactive         

 

                atorvastatin 20 mg tablet RxNorm: 176409  TAKE ONE TABLET BY SHELLY

TH DAILY 

2021          Inactive             

 

                    Depo-Estradiol 5 mg/mL intramuscular oil RxNorm: 589011     

 3/4 Milliliter(s) 

Intramuscular monthly 2021      Inactive         

 

                    Depo-Provera 150 mg/mL intramuscular suspension RxNorm: 1000

128     Milliliter(s) 

Intramuscular   2021      Inactive         

 

                    alprazolam 0.5 mg tablet RxNorm: 978801      1 Tablet(s) Ora

l two times a day as 

needed anxiety  2020      Inactive         

 

                    acyclovir 400 mg tablet RxNorm: 806229      TAKE ONE TABLET 

BY MOUTH FOUR TIMES A DAY

                2020      Inactive         

 

                    alprazolam 0.5 mg tablet RxNorm: 678710      1 Tablet(s) Ora

l two times a day as 

needed anxiety  2020      12/15/2020      Inactive         

 

             atorvastatin 20 mg tablet RxNorm: 431032 1 Tablet(s) Oral every day

 2020                Inactive                   

 

             atorvastatin 20 mg tablet RxNorm: 420370 1 Tablet(s) Oral every day

 2020   

02/10/2021                Inactive                   

 

                    alprazolam 0.5 mg tablet RxNorm: 028548      1 Tablet(s) Ora

l two times a day as 

needed anxiety  10/23/2020      11/15/2020      Inactive         

 

                    Depo-Provera 150 mg/mL intramuscular suspension RxNorm: 1000

128     Milliliter(s) 

Intramuscular   10/20/2020      10/20/2020      Inactive         

 

                    acyclovir 400 mg tablet RxNorm: 570838      TAKE ONE TABLET 

BY MOUTH FOUR TIMES A DAY

                10/06/2020      11/15/2020      Inactive         

 

                    alprazolam 0.5 mg tablet RxNorm: 296076      1 Tablet(s) Ora

l two times a day as 

needed anxiety  2020      10/21/2020      Inactive         

 

                    alprazolam 0.5 mg tablet RxNorm: 171316      1 Tablet(s) Ora

l two times a day as 

needed anxiety  2020      Inactive         

 

                escitalopram 5 mg tablet RxNorm: 147395  1 Tablet(s) Oral every 

night at bedtime 

2020          Inactive             

 

             prednisone 20 mg tablet RxNorm: 776002 2 Tablet(s) Oral every day 0

2020                Inactive                   

 

                    Depo-Provera 150 mg/mL intramuscular suspension RxNorm: 1000

128     0.75 

Milliliter(s) Intramuscular 2020      Inactive         

 

                    Kenalog 40 mg/mL suspension for injection RxNorm: 4888629   

  1 Milliliter(s) 

Injection       2020      Inactive         

 

                Zithromax Z-Timur 250 mg tablet RxNorm: 363632  Tablet(s) Oral as 

directed 

2020          Inactive             

 

                    alprazolam 0.5 mg tablet RxNorm: 904847      1 Tablet(s) Ora

l two times a day as 

needed anxiety  2020      Inactive         

 

                    alprazolam 0.5 mg tablet RxNorm: 589471      1 Tablet(s) Ora

l two times a day as 

needed anxiety  2020      Inactive         

 

                    Depo-Provera 150 mg/mL intramuscular suspension RxNorm: 1000

128     Milliliter(s) 

Intramuscular   2020      Inactive         

 

                    acyclovir 400 mg tablet RxNorm: 131091      1 Tablet(s) Oral

 four times a day fever 

blisters        2020      Inactive         

 

                    Depo-Provera 150 mg/mL intramuscular suspension RxNorm: 1000

128     Milliliter(s) 

Intramuscular   2020      Inactive         

 

                pravastatin 10 mg tablet RxNorm: 681898  1 Tablet(s) Oral every 

night at bedtime 

2020          Inactive             

 

                pravastatin 10 mg tablet RxNorm: 477251  1 Tablet(s) Oral every 

night at bedtime 

2020          Inactive             

 

                    Depo-Estradiol 5 mg/mL intramuscular oil RxNorm: 243109     

 3/4 Milliliter(s) 

Intramuscular monthly 2020      Inactive         

 

                    Kenalog 40 mg/mL suspension for injection RxNorm: 8272079   

  Milliliter(s) 

Injection       2019      Inactive         

 

                    Phenergan with Codeine Syrup RxNorm:             5-10 Millil

iter(s) Oral Every 6 hrs as 

needed          2019      Inactive         

 

             prednisone 20 mg tablet RxNorm: 343782 2 Tablet(s) Oral every day 1

2019                Inactive                  start tomorrow

 

                    doxycycline hyclate 100 mg tablet RxNorm: 6513450     1 Tabl

et(s) Oral two times a 

day             2019      Inactive        start if symptom

s do not improve

 

                    Depo-Estradiol 5 mg/mL intramuscular oil RxNorm: 697344     

 3/4 Milliliter(s) 

Intramuscular monthly 2019      Inactive         

 

                    Depo-Estradiol 5 mg/mL intramuscular oil RxNorm: 348482     

 3/4 Milliliter(s) 

Intramuscular monthly 10/31/2019      2019      Inactive         

 

                    gabapentin 100 mg capsule RxNorm: 972772      1 Capsule(s) O

ral every night at 

bedtime         10/16/2019      2019      Inactive         

 

             gabapentin 100 mg capsule RxNorm: 737369 1 Capsule(s) PO QHS 09/12/

2019   

10/11/2019                Inactive                   

 

           naproxen 500 mg tablet RxNorm: 827240 1 Tablet(s) PO BID 2019 0

2019 

Inactive                                 

 

             escitalopram 5 mg tablet RxNorm: 304511 1 Tablet(s) PO QHS 08/15/20

19   2019

                          Inactive                   

 

          CoQ-10 oral RxNorm: 33015 oral      2021           Active     

 

             ranitidine 150 mg capsule RxNorm: 611830 1 Capsule(s) PO BID 2019                Inactive                   

 

             eszopiclone 1 mg tablet RxNorm: 505170 Tablet(s) PO as needed 2020                Inactive                   

 

                acyclovir 400 mg tablet RxNorm: 439872  1 Tablet(s) PO PRN fever

 blisters 

2020          Inactive             

 

             clonidine HCl 0.1 mg tablet RxNorm: 708618 Tablet(s) PO as needed 0

8/15/2019   

2019                Inactive                   







Medication Administered





             Medication   Codes        Instructions Start Date   Status

 

                Kenalog 40 mg/mL suspension for injection RxNorm: 0478621 1Milli

liter     10/25/2021

                                        No longer Active

 

                Depo-Estradiol 5 mg/mL intramuscular oil RxNorm: 111568  0.75Mil

liliter  

2021                              No longer Active

 

                Depo-Estradiol 5 mg/mL intramuscular oil RxNorm: 586077  0.75Mil

liliter  

2021                              No longer Active

 

                Depo-Provera 150 mg/mL intramuscular suspension RxNorm: 3172507 

Milliliter      

2021                              No longer Active

 

                Depo-Provera 150 mg/mL intramuscular suspension RxNorm: 4397094 

Milliliter      

2021                              No longer Active

 

                Depo-Provera 150 mg/mL intramuscular suspension RxNorm: 3605037 

Milliliter      

10/20/2020                              No longer Active

 

                Depo-Provera 150 mg/mL intramuscular suspension RxNorm: 1252432 

0.75Milliliter  

2020                              No longer Active

 

                Kenalog 40 mg/mL suspension for injection RxNorm: 8733325 1Milli

liter     2020

                                        No longer Active

 

                Depo-Provera 150 mg/mL intramuscular suspension RxNorm: 0187406 

Milliliter      

2020                              No longer Active

 

                Depo-Provera 150 mg/mL intramuscular suspension RxNorm: 5061334 

Milliliter      

2020                              No longer Active

 

                Depo-Estradiol 5 mg/mL intramuscular oil RxNorm: 985805  3/4Mill

ilitermonthly 

2020                              Active

 

             Kenalog 40 mg/mL suspension for injection RxNorm: 4140422 Millilite

r   2019   

No longer Active

 

                Depo-Estradiol 5 mg/mL intramuscular oil RxNorm: 430954  3/4Mill

ilitermonthly 

2019                              Active







Immunizations

No Immunization data



Results





          Observation Observation Code Item      Item Code Result    Date      S

[facility] Location

 

          Cbc With Differential Ord2      WBC                 11.77 K/ul 

021 Unknown

 

          Cbc With Differential Ord2      RBC                 4.50 M/ul 20

21 Unknown

 

          Cbc With Differential Ord2      HGB                 13.6 g/dl 20

21 Unknown

 

          Cbc With Differential Ord2      Neut%               69.1 %    20

21 Unknown

 

          Cbc With Differential Ord2      HCT                 43.0 %    20

21 Unknown

 

          Cbc With Differential Ord2      MCV                 95.6 fl   20

21 Unknown

 

          Cbc With Differential Ord2      Lymph%              22.0 %    20

21 Unknown

 

          Cbc With Differential Ord2      MCH                 30.2 pg   20

21 Unknown

 

          Cbc With Differential Ord2      Mono%               6.0 %     20

21 Unknown

 

          Cbc With Differential Ord2      Eos%                2.6 %     20

21 Unknown

 

          Cbc With Differential Ord2      MCHC                31.6 pg   20

21 Unknown

 

          Cbc With Differential Ord2      PLT                 288 K/ul  20

21 Unknown

 

          Cbc With Differential Ord2      Baso%               0.3 %     20

21 Unknown

 

          Cbc With Differential Ord2      RDW                 13.5 %    20

21 Unknown

 

          Cbc With Differential Ord2      Neut ABS#           8.13 K/ul 11/16/20

21 Unknown

 

          Cbc With Differential Ord2      Lymph ABS#           2.59 K/ul 

021 Unknown

 

          Cbc With Differential Ord2      Mono ABS#           0.7 K/ul  20

21 Unknown

 

          Cbc With Differential Ord2      Eos ABS#            0.3 K/ul  20

21 Unknown

 

          Cbc With Differential Ord2      Baso ABS#           0.0 K/ul  20

21 Unknown

 

          Comp Metabolic Mlz935    NA                  142 mEq/L 2021 Unkn

own

 

          Comp Metabolic Acf571    K                   4.4 mEq/L 2021 Unkn

own

 

          Comp Metabolic Gsl097    CL                  101 mEq/L 2021 Unkn

own

 

          Comp Metabolic Caq339    CO2                 32.0 mEq/L 2021 Unk

nown

 

          Comp Metabolic Wab790    ANION GAP           13        2021 Unkn

own

 

          Comp Metabolic Bzf606    GLUCOSE             89 mg/dL  2021 Unkn

own

 

          Comp Metabolic Pfv999    Creat               0.7 mg/dL 2021 Unkn

own

 

          Comp Metabolic Dzr031    eGFR                90 ml/min/1.73m2 20

21 Unknown

 

          Comp Metabolic Swl905    BUN                 19 mg/dL  2021 Unkn

own

 

          Comp Metabolic Pxg247    B/C Ratio           26.4 Ratio 2021 Unk

nown

 

          Comp Metabolic Dve951    CALCIUM             9.2 mg/dL 2021 Unkn

own

 

          Comp Metabolic Hbp233    ALK PHOS            85 U/L    2021 Unkn

own

 

          Comp Metabolic Npe093    AST(SGOT)           16 U/L    2021 Unkn

own

 

          Comp Metabolic Pts428    ALT(SGPT)           19 U/L    2021 Unkn

own

 

          Comp Metabolic Szj878    BILI T              0.6 mg/dL 2021 Unkn

own

 

          Comp Metabolic Uso704    ALBUMIN             4.2 g/dL  2021 Unkn

own

 

          Comp Metabolic Moq543    TPRO                7.1 g/dL  2021 Unkn

own

 

          Comp Metabolic Rnu775    GLOB                2.9 g/dL  2021 Unkn

own

 

          Comp Metabolic Xzb167    A/G Ratio           1.5 Ratio 2021 Unkn

own

 

          Comp Metabolic Odo954    Osmo                285 mOsmo 2021 Unkn

own

 

          Tsh       Ord6      TSH (3rd IS)           1.14 uIU/mL 2021 Unkn

own

 

          Lipid     Ord30     CHOL                233 mg/dL 2021 Unknown

 

          Lipid     Ord30     HDL                 54.0 mg/dl 2021 Unknown

 

          Lipid     Ord30     TRIG                101 mg/dL 2021 Unknown

 

          Lipid     Ord30     LDL                 159 mg/dL 2021 Unknown

 

          Lipid     Ord30     C/HDL               4.3 Ratio 2021 Unknown

 

          Lipid     Ord30     CHOL                249 mg/dL 10/22/2020 Unknown

 

          Lipid     Ord30     HDL                 55.0 mg/dl 10/22/2020 Unknown

 

          Lipid     Ord30     TRIG                82 mg/dL  10/22/2020 Unknown

 

          Lipid     Ord30     LDL                 178 mg/dL 10/22/2020 Unknown

 

          Lipid     Ord30     C/HDL               4.5 Ratio 10/22/2020 Unknown

 

          Comp Metabolic Weh772    NA                  138 mEq/L 10/22/2020 Unkn

own

 

          Comp Metabolic Zed839    K                   4.5 mEq/L 10/22/2020 Unkn

own

 

          Comp Metabolic Uwy972    CL                  103 mEq/L 10/22/2020 Unkn

own

 

          Comp Metabolic Gvo034    CO2                 28.0 mEq/L 10/22/2020 Unk

nown

 

          Comp Metabolic Xrb161    ANION GAP           12        10/22/2020 Unkn

own

 

          Comp Metabolic Rbg557    GLUCOSE             90 mg/dL  10/22/2020 Unkn

own

 

          Comp Metabolic Bij009    Creat               0.7 mg/dL 10/22/2020 Unkn

own

 

          Comp Metabolic Srw107    eGFR                88 ml/min/1.73m2 10/22/20

20 Unknown

 

          Comp Metabolic Djy288    BUN                 20 mg/dL  10/22/2020 Unkn

own

 

          Comp Metabolic Lqw241    B/C Ratio           27.0 Ratio 10/22/2020 Unk

nown

 

          Comp Metabolic Buy885    CALCIUM             9.5 mg/dL 10/22/2020 Unkn

own

 

          Comp Metabolic Ecj080    ALK PHOS            87 U/L    10/22/2020 Unkn

own

 

          Comp Metabolic Vei385    AST(SGOT)           17 U/L    10/22/2020 Unkn

own

 

          Comp Metabolic Ldu645    ALT(SGPT)           20 U/L    10/22/2020 Unkn

own

 

          Comp Metabolic Cqb556    BILI T              0.6 mg/dL 10/22/2020 Unkn

own

 

          Comp Metabolic Rms266    ALBUMIN             4.5 g/dL  10/22/2020 [facility]

[facility]

 

          Comp Metabolic Lzw951    TPRO                7.6 g/dL  10/22/2020 [facility]

own

 

          Comp Metabolic Uii444    GLOB                3.1 g/dL  10/22/2020 [facility]

own

 

          Comp Metabolic Lft264    A/G Ratio           1.5 Ratio 10/22/2020 [facility]

own

 

          Comp Metabolic Yiz052    Osmo                278 mOsmo 10/22/2020 [facility]

own

 

          Cbc With Differential Ord2      WBC                 10.56 K/ul 10/22/2

020 Unknown

 

          Cbc With Differential Ord2      RBC                 4.44 M/ul 10/22/20

20 Unknown

 

          Cbc With Differential Ord2      HGB                 14.0 g/dl 10/22/20

20 Unknown

 

          Cbc With Differential Ord2      HCT                 42.8 %    10/22/20

20 Unknown

 

          Cbc With Differential Ord2      Neut%               73.9 %    10/22/20

20 Unknown

 

          Cbc With Differential Ord2      MCV                 96.4 fl   10/22/20

20 Unknown

 

          Cbc With Differential Ord2      Lymph%              18.2 %    10/22/20

20 Unknown

 

          Cbc With Differential Ord2      MCH                 31.5 pg   10/22/20

20 Unknown

 

          Cbc With Differential Ord2      Mono%               5.4 %     10/22/20

20 Unknown

 

          Cbc With Differential Ord2      Eos%                2.2 %     10/22/20

20 Unknown

 

          Cbc With Differential Ord2      MCHC                32.7 pg   10/22/20

20 Unknown

 

          Cbc With Differential Ord2      PLT                 258 K/ul  10/22/20

20 Unknown

 

          Cbc With Differential Ord2      Baso%               0.3 %     10/22/20

20 Unknown

 

          Cbc With Differential Ord2      Neut ABS#           7.81 K/ul 10/22/20

20 Unknown

 

          Cbc With Differential Ord2      RDW                 13.4 %    10/22/20

20 Unknown

 

          Cbc With Differential Ord2      Lymph ABS#           1.92 K/ul 10/22/2

020 Unknown

 

          Cbc With Differential Ord2      Mono ABS#           0.6 K/ul  10/22/20

20 Unknown

 

          Cbc With Differential Ord2      Eos ABS#            0.2 K/ul  10/22/20

20 Unknown

 

          Cbc With Differential Ord2      Baso ABS#           0.0 K/ul  10/22/20

20 Unknown

 

          Tsh       Ord6      TSH (3rd IS)           1.38 uIU/mL 2020 [facility]

[facility]

 

          Comp Metabolic Rby178    NA                  141 mEq/L 2020 [facility]

[facility]

 

          Comp Metabolic Gdb966    K                   4.3 mEq/L 2020 [facility]

[facility]

 

          Comp Metabolic Nyv930    CL                  104 mEq/L 2020 [facility]

[facility]

 

          Comp Metabolic Imb498    CO2                 29.0 mEq/L 2020 [facility]

[facility]

 

          Comp Metabolic Aiu157    ANION GAP           12        2020 [facility]

[facility]

 

          Comp Metabolic Ljf551    GLUCOSE             83 mg/dL  2020 [facility]

[facility]

 

          Comp Metabolic Ara468    Creat               0.7 mg/dL 2020 [facility]

[facility]

 

          Comp Metabolic Efd844    eGFR                102 ml/min/1.73m2 

020 Unknown

 

          Comp Metabolic Xxf598    BUN                 16 mg/dL  2020 [facility]

[facility]

 

          Comp Metabolic Irg923    B/C Ratio           24.6 Ratio 2020 [facility]

[facility]

 

          Comp Metabolic Qzs956    CALCIUM             9.3 mg/dL 2020 [facility]

[facility]

 

          Comp Metabolic Iqp714    ALK PHOS            79 U/L    2020 [facility]

[facility]

 

          Comp Metabolic Wgv697    AST(SGOT)           19 U/L    2020 [facility]

[facility]

 

          Comp Metabolic Bhj186    ALT(SGPT)           26 U/L    2020 [facility]

[facility]

 

          Comp Metabolic Bxc383    BILI T              0.5 mg/dL 2020 [facility]

[facility]

 

          Comp Metabolic Dry411    ALBUMIN             4.2 g/dL  2020 [facility]

[facility]

 

          Comp Metabolic Ova897    TPRO                7.0 g/dL  2020 [facility]

[facility]

 

          Comp Metabolic Cts712    GLOB                2.8 g/dL  2020 [facility]

[facility]

 

          Comp Metabolic Gap833    A/G Ratio           1.5 Ratio 2020 [facility]

[facility]

 

          Comp Metabolic Vqa543    Osmo                282 mOsmo 2020 [facility]

[facility]

 

          Cbc With Differential Ord2      WBC                 8.94 K/ul 20

20 Unknown

 

          Cbc With Differential Ord2      RBC                 4.34 M/ul 20

20 Unknown

 

          Cbc With Differential Ord2      HGB                 13.3 g/dl 20

20 Unknown

 

          Cbc With Differential Ord2      HCT                 40.7 %    20

20 Unknown

 

          Cbc With Differential Ord2      Neut%               71.8 %    20

20 Unknown

 

          Cbc With Differential Ord2      MCV                 93.8 fl   20

20 Unknown

 

          Cbc With Differential Ord2      Lymph%              19.5 %    20

20 Unknown

 

          Cbc With Differential Ord2      MCH                 30.6 pg   20

20 Unknown

 

          Cbc With Differential Ord2      Mono%               6.2 %     20

20 Unknown

 

          Cbc With Differential Ord2      Eos%                2.2 %     20

20 Unknown

 

          Cbc With Differential Ord2      MCHC                32.7 pg   20

20 Unknown

 

          Cbc With Differential Ord2      Baso%               0.3 %     20

20 Unknown

 

          Cbc With Differential Ord2      PLT                 262 K/ul  20

20 Unknown

 

          Cbc With Differential Ord2      RDW                 13.4 %    20

20 Unknown

 

          Cbc With Differential Ord2      Neut ABS#           6.42 K/ul 20

20 Unknown

 

          Cbc With Differential Ord2      Lymph ABS#           1.74 K/ul 

020 Unknown

 

          Cbc With Differential Ord2      Mono ABS#           0.6 K/ul  20

20 Unknown

 

          Cbc With Differential Ord2      Eos ABS#            0.2 K/ul  20

20 Unknown

 

          Cbc With Differential Ord2      Baso ABS#           0.0 K/ul  20

20 Unknown

 

          Lipid     Ord30     CHOL                240 mg/dL 2020 Unknown

 

          Lipid     Ord30     HDL                 57.0 mg/dl 2020 Unknown

 

          Lipid     Ord30     TRIG                80 mg/dL  2020 Unknown

 

          Lipid     Ord30     LDL                 167 mg/dL 2020 Unknown

 

          Lipid     Ord30     C/HDL               4.2 Ratio 2020 Unknown

 

          C A/B FLU [id number]   Influenza A Scr           Negative  2019 [facility]

[facility]

 

          C A/B FLU [id number]   Influenza B Scr           Negative  2019 [facility]

[facility]

 

           C A/B FLU  [id number]    Influenza Intrp B AG:PRID:PT:NOSE:NOM:IF       

     See Footnote  

2019                              Unknown







Procedures





                    Procedure           Codes               Date

 

                    THER/PROPH/DIAG INJ SC/IM CPT-4: 54947        2021

 

                    THER/PROPH/DIAG INJ SC/IM CPT-4: 81209        2021

 

                    THER/PROPH/DIAG INJ SC/IM CPT-4: 57952        2021

 

                    THER/PROPH/DIAG INJ SC/IM CPT-4: 61008        2021

 

                    THER/PROPH/DIAG INJ SC/IM CPT-4: 32646        10/20/2020

 

                    THER/PROPH/DIAG INJ SC/IM CPT-4: 82169        2020

 

                    TRIAMCINOLONE ACET INJ NOS 10 mg CPT-4:         

020

 

                    THER/PROPH/DIAG INJ SC/IM CPT-4: 04792        2020

 

                    THER/PROPH/DIAG INJ SC/IM CPT-4: 28056        2020

 

                    THER/PROPH/DIAG INJ SC/IM CPT-4: 89737        2020

 

                    TRIAMCINOLONE ACET INJ NOS 10 mg CPT-4:         

019

 

                    THER/PROPH/DIAG INJ SC/IM CPT-4: 83501        2019







Vital Signs





                          Date                      Vital

 

                2021      Blood Pressure 1: 156/82 Code: 8480-6 Heart Rate

 1: 81 bpm Height: 

5'2" Code: 8302-2   Height: 5'2" Code: 8302-2 SpO2: 99%           Temperature: 3

6.2 (C) / 

97.1 (F)                                Weight:  Code: 88472-8

 

                10/25/2021      Blood Pressure 1: 142/68 Code: 8480-6 BMI: 30.7 

Code: 36997-7 Heart 

Rate 1: 63 bpm      Height: 5'2" Code: 8302-2 SpO2: 97%           Temperature: 3

6.4 (C) / 97.6 

(F)                                     Weight: 168 lbs  Code: 09202-4

 

                2020      Blood Pressure 1: 130/80 Code: 8480-6 BMI: 28.5 

Code: 27467-3 Heart 

Rate 1: 69 bpm      Height: 5'2" Code: 8302-2 SpO2: 97%           Temperature: 3

6.9 (C) / 98.4 

(F)                                     Weight: 156 lbs  Code: 53580-8

 

                2020      Blood Pressure 1: 128/78 Code: 8480-6 Heart Rate

 1: 74 bpm Height:  

Code: 8302-2              SpO2: 98%                 Weight:  Code: 59179-5

 

                    2020          Height:  Code: 8302-2 Weight:  Code: 294

63-7

 

                2020      Blood Pressure 1: 132/80 Code: 8480-6 BMI: 28.9 

Code: 63648-4 Heart 

Rate 1: 68 bpm      Height: 5'2" Code: 8302-2 SpO2: 97%           Weight: 158 lb

s  Code: 

61432-2

 

                2019      Blood Pressure 1: 110/60 Code: 8480-6 BMI: 28.5 

Code: 04023-5 Heart 

Rate 1: 76 bpm      Height: 5'2" Code: 8302-2 SpO2: 98%           Temperature: 3

6.9 (C) / 98.5 

(F)                                     Weight: 156 lbs  Code: 12103-5

 

             2019   Blood Pressure 1: 124/80 Code: 8480-6 Heart Rate 1: 81

 bpm SpO2: 98%    

Temperature: 36.7 (C) / 98.1 (F)

 

                2019      Blood Pressure 1: 130/78 Code: 8480-6 BMI: 28.0 

Code: 04981-3 Heart 

Rate 1: 68 bpm      Height: 5'2" Code: 8302-2 SpO2: 98%           Weight: 153 lb

s  Code: 

83201-3

 

                2019      Heart Rate 1: 68 bpm Height:  Code: 8302-2 Weigh

t:  Code: 31920-8

 

                2019      Blood Pressure 1: 132/74 Code: 8480-6 BMI: 27.4 

Code: 83896-2 Heart 

Rate 1: 66 bpm      Height: 5'2" Code: 8302-2 SpO2: 98%           Weight: 150 lb

s  Code: 

88230-9

 

                08/15/2019      Blood Pressure 1: 104/60 Code: 8480-6 BMI: 27.5 

Code: 12009-7 Heart 

Rate 1: 66 bpm      Height: 5'2" Code: 8302-2 SpO2: 98%           Weight: 150 lb

s 5 oz Code: 

22774-6







Functional Status

No Functional Status data



Reason For Visit





                    Reason For Visit    Effective Dates     Notes

 

                    cough               2021           

 

                    sinus congestion    10/25/2021           

 

                    headache            2020           

 

                    headache            2020           

 

                    well woman exam (40-65 years) 2020           

 

                    cough               2019           

 

                    fever               2019           

 

                    menopausal symptoms 2019           

 

                    lower leg pain      2019           

 

                    lower leg pain      2019           

 

                    anxiety             08/15/2019           







Encounters





             Encounter    Performer    Location     Codes        Date

 

                                        [phone]) 25398 EST. PATIENT, LEVEL IV

Diagnosis: Acute bronchitis[ICD10: J20.9]

Diagnosis: Laryngitis[ICD10: J04.0]

Diagnosis: Other allergic rhinitis[ICD10: J30.89]

Diagnosis: Slow transit constipation[ICD10: K59.01] [facility]                   CPT-4: 27905              2021

 

                                        [phone]) 04302 EST. PATIENT, LEVEL IV

Diagnosis: Generalized anxiety disorder[ICD10: F41.9]

Diagnosis: Cough[ICD10: R05.9]

Diagnosis: Other allergic rhinitis[ICD10: J30.89]

Diagnosis: Encounter for screening mammogram for malignant neoplasm of 
breast[ICD10: Z12.31] Eulalia Crump MD, [facility] CPT-4: 55429    

10/25/2021

 

                                        [phone]) 40756 EST. PATIENT, LEVEL III

Diagnosis: Headache[ICD10: R51]

Diagnosis: Other allergic rhinitis[ICD10: J30.89] Bree Gutierres MD, [facility]          CPT-4: 93180              2020

 

                                        83993 EST. PATIENT, LEVEL III

Diagnosis: Other acute sinusitis[ICD10: J01.80]

Diagnosis: Other allergic rhinitis[ICD10: J30.89]

Diagnosis: Migraine without status migrainosus, not intractable, unspecified 
migraine type[ICD10: G43.909]

Diagnosis: Menopausal and female climacteric states[ICD10: N95.1] Flakita Crump MD, [facility]    CPT-4: 40995              2020

 

                                        [phone]) 11552 EST. PATIENT, LEVEL III

Diagnosis: Generalized anxiety disorder[ICD10: F41.1] Yesica Crump MD, [facility]          CPT-4: 11085              2020

 

                                        (44424) PREV VISIT EST AGE 40-64

Diagnosis: Encounter for gynecological examination (general) (routine) without 
abnormal findings[ICD10: Z01.419] Bree Crump MD, [facility] CPT

-4:

95230                                   2020

 

                                        [phone]) 11601 EST. PATIENT, LEVEL III

Diagnosis: Cough[ICD10: R05]

Diagnosis: Acute bronchitis[ICD10: J20.9] Bree carrasco MD, 

[facility]                       CPT-4: 59945              2019

 

                                        [phone]) 65777 EST. PATIENT, LEVEL III

Diagnosis: Fever[ICD10: R50.9]

Diagnosis: Body aches[ICD10: R52] Bree Crump MD, [facility] CPT

-4:

93676                                   2019

 

                                        [phone]) 40414 EST. PATIENT, LEVEL III

Diagnosis: Menopausal and female climacteric states[ICD10: N95.1] Bree Crump MD, [facility] CPT-4: 34208        2019

 

                                        68621 EST. PATIENT, LEVEL III

Diagnosis: Pain in left ankle and joints of left foot[ICD10: M25.572]

Diagnosis: Pain in left knee[ICD10: M25.562] [facility]

                          CPT-4: 24028              2019

 

                                        12928 EST. PATIENT, LEVEL III

Diagnosis: Pain in left ankle and joints of left foot[ICD10: M25.572]

Diagnosis: Pain in left knee[ICD10: M25.562] [facility]

                          CPT-4: 28609              2019

 

                                        (24164) PREV VISIT NEW AGE 40-64

Diagnosis: Encounter for general adult medical examination without abnormal 
findings[ICD10: Z00.00] [facility] CPT-4: 44142    

08/15/2019







Plan of Care





             Planned Activity Notes        Codes        Status       Date

 

                          Visit Plan:               Allergies - recent increase 

in nasal congestion that has been 

worsening cough. Has tried Claritin in the past without any relief. Discussed 
use of being aggressive with allergies to help decrease cough and loss of voice.
Discussed use of Allegra twice a day x 5 days then decrease down to daily. BP 
elevated the last 2 visits, so discouraged use of Sudafed. Discussed us of 
Cloricidin HP as needed for congestions. Laryngitis - secondary to acute 
bronchitis that has worsened over the last month. Cough has improved, but pt 
reports tightness when lying down. Discussed possible silent GERD due to increas
e in coughing. Recommended taking Omeprazole daily x 2 weeks. Acute Bronchitis -
cough improved, denies chest congestion. Completed 2 rounds of antibiotics and 2
rounds of steroids. Discussed treat more aggressive with allergies and GERD. Pt 
encouraged to follow up in 2 weeks if no improvement. Slow transit constipation 
- encouraged use Miralax 1 capful daily, may titrate based on needed. Allergies 
exacerbation with cough - chronic - recommended pt to use allergy medication as 
prescribed. Pt has been counseled as to the appropriate use of the medication. 
Pt to call if allergy symptoms are not controlled with the medication. If using 
nasal spray, instructions as follows: Nasal spray- use twice daily, one spray 
per nostril twice daily, after 30 minutes, rinse out nose with saline spray.. 
Use opposite hand per nostril to spray in the nasal steroid allergy spray. 
Kenalog injection given at visit. Prednisone rx sent. Phenergan with codeine 
cough medicine sent. Screening mammogram - orders sent. Anxiety - controlled. 
Continue prn alprazolam at bedtime and escitalopram. Fasting lab orders sent

                                                            2021

 

                                        Appointment: Eulalia Osman 

WPtel:[phone]

                                        [facility] [facility]

                                              (30 min) Complex 2021

 

             Patient Education: Patient Medication Summary                      

     Completed    2021

 

             Patient Education: Patient Medication Summary                      

     Completed    2021

 

                          Visit Plan:               Allergies exacerbation with 

cough - chronic - recommended pt to use

allergy medication as prescribed. Pt has been counseled as to the appropriate 
use of the medication. Pt to call if allergy symptoms are not controlled with 
the medication. If using nasal spray, instructions as follows: Nasal spray- use 
twice daily, one spray per nostril twice daily, after 30 minutes, rinse out nose
with saline spray.. Use opposite hand per nostril to spray in the nasal steroid 
allergy spray. Kenalog injection given at visit. Prednisone rx sent. Phenergan 
with codeine cough medicine sent. Screening mammogram - orders sent. Anxiety - 
controlled. Continue prn alprazolam at bedtime and escitalopram. Fasting lab 
orders sent

                                                            10/25/2021

 

                                        Appointment: Eulalia Osman 

WPtel:[phone]

                                        [facility] [facility]

                                              (30 min) Complex 10/25/2021

 

             Patient Education: Patient Medication Summary                      

     Completed    10/25/2021

 

             Patient Education: prednisone- [employer] [id number]         

                  Completed    

10/25/2021

 

             Appointment:                            Injection    2021

 

             Patient Education: Patient Medication Summary                      

     Completed    2021

 

             Appointment:                            Injection    2021

 

             Patient Education: Patient Medication Summary                      

     Completed    2021

 

             Appointment:                            Injection    2021

 

             Appointment:                            Injection    2021

 

             Patient Education: Patient Medication Summary                      

     Completed    2021

 

             Appointment:                            Injection    2021

 

             Patient Education: Patient Medication Summary                      

     Completed    2021

 

             Patient Education: Patient Medication Summary                      

     Completed    11/10/2020

 

             Patient Education: Patient Medication Summary                      

     Completed    10/22/2020

 

             Appointment:                            Injection    10/20/2020

 

             Patient Education: Patient Medication Summary                      

     Completed    10/20/2020

 

                          Visit Plan:               Allergies - chronic - recomm

ended pt to use allergy medication as 

prescribed. Pt has been counseled as to the appropriate use of the medication. 
Pt to call if allergy symptoms are not controlled with the medication. If using 
nasal spray, instructions as follows: Nasal spray- use twice daily, one spray 
per nostril twice daily, after 30 minutes, rinse out nose with saline spray.. 
Use opposite hand per nostril to spray in the nasal steroid allergy spray. 
Headache -will treat allergies/congestion - instructed patient to call if does 
not improve and we will order CT Head without contrast.

                                                            2020

 

                                        Appointment: Bree Oleary 

WPtel:[phone]

                                        [facility] [id number] [place] [address]                                              (15 min) Moderate 2020

 

             Patient Education: Patient Medication Summary                      

     Completed    2020

 

                          Visit Plan:               Sinusitis - Pt has acute inf

ection - pain in face, maxillary 

region, Pt informed to use decongestant, RX given to patient, sinus rinses also 
recommended. Call if symptoms do not show improvement. Allergies - chronic - 
recommended pt to use allergy medication as prescribed. Pt has been counseled as
to the appropriate use of the medication. Pt to call if allergy symptoms are not
controlled with the medication. If using nasal spray, instructions as follows: 
Nasal spray- use twice daily, one spray per nostril twice daily, after 30 
minutes, rinse out nose with saline spray.. Use opposite hand per nostril to 
spray in the nasal steroid allergy spray. Migraine - will treat for sinus 
infection - pt is to notify clinic if symptoms do not improve, if they worsen, 
or with any changes, questions, or concerns.

                                                            2020

 

                                        Appointment: Flakita Castro 

WPtel:[phone]

                                        [facility]                                              (30 min) Complex 2020

 

             Patient Education: Patient Medication Summary                      

     Completed    2020

 

                          Visit Plan:               Anxiety - the patient has un

controlled anxiety and will benefit 

from a short term dose of xanax for control of symptoms.

                                                            2020

 

                                        Appointment: Yesica Crump 

WPtel:[phone]

                                        [facility]                                              TeleHealth      2020

 

             Patient Education: Patient Medication Summary                      

     Completed    2020

 

             Appointment:                            Injection    2020

 

             Patient Education: Patient Medication Summary                      

     Completed    2020

 

             Appointment:                            Injection    2020

 

             Appointment:                            Injection    2020

 

             Patient Education: Patient Medication Summary                      

     Completed    2020

 

                                        Appointment: Yesica Crump 

WPtel:[phone]

                                        [address] [place], [place] [address]

                                              Well Woman      03/10/2020

 

                          Visit Plan:               Well Adult Female - exam com

pleted. Pap and breast exam completed. 

Pt will be called with results of her testing. She was advised to continue with 
yearly annual exams. Safe sex practices discussed during office visit today. 
Call if any abnormal gynecologic issues during the next year, otherwise, RTC 
yearly or prn.

                                                            2020

 

                                        Appointment: Bree Oleary 

WPtel:[phone]

                                        [address] [place], [place] [address]

                                              Well Woman      2020

 

             Patient Education: Patient Medication Summary                      

     Completed    2020

 

                                        Appointment: Yesica Crump 

WPtel:[phone]

                                        [address] [place], [place] [id number]                                              (15 min) Moderate 2020

 

             Appointment:                            Injection    2020

 

             Patient Education: Patient Medication Summary                      

     Completed    2020

 

                          Visit Plan:               Bronchitis - acute case of b

ronchitis identified. Pt has been given

antibiotics, breathing treatments as appropriate, and pt has been instructed to 
call if symptoms are not improved, or if symptoms acutely worsen.

                                                            2019

 

                                        Appointment: Bree Oleary 

WPtel:[phone]

                                        [address] [place], [place] [id number]

                                              (15 min) Moderate 2019

 

             Patient Education: Patient Medication Summary                      

     Completed    2019

 

                          Visit Plan:               URI -viral -flu swab to r/o 

flu - Pt advised to increase fluids, 

vitamin C. Discussed natural and expected course of this diagnosis and need to 
alert me if symptoms do not follow expected course, or if any worse.

                                                            2019

 

                                        Appointment: Bree Oleary 

WPtel:[phone]

                                        [facility]

                                              (30 min) Complex 2019

 

             Patient Education: Patient Medication Summary                      

     Completed    2019

 

                          Visit Plan:               Post-surgical menopause - co

ntinue to taper depo dose and 

eventually off medication completely -injection today in the office.

                                                            2019

 

                                        Appointment: Bree Oleary 

WPtel:[phone]

                                        [facility]

[place]                                              (15 min) Moderate 2019

 

             Patient Education: Patient Medication Summary                      

     Completed    2019

 

                                        Appointment: Bree Oleary 

WPtel:[phone]

                                        [facility]

[place]                                              (30 min) Complex 2019

 

                          Visit Plan:               Left knee, ankle, foot pain 

- will send RX - will refer to ortho - 

The pt is to use prn antiinflammatories to manage acute pain. The patient is to 
call the office if the pain is worsening or does not improve.

                                                            2019

 

                                        Appointment: Flakita Castro 

WPtel:[phone]

                                        [facility]

                                              (30 min) Complex 2019

 

             Patient Education: Patient Medication Summary                      

     Completed    2019

 

                    Care Plan: Referral Order                     SNOMED-CT : 30

[id number]

                          Pending                   2019

 

                          Visit Plan:               Left knee, ankle, foot pain 

- will send RX - if no improvement will

refer to ortho - The pt is to use prn antiinflammatories to manage acute pain. 
The patient is to call the office if the pain is worsening or does not improve.

                                                            2019

 

                                        Appointment: Flakita Castro 

WPtel:[phone]

                                        [facility]

                                              (30 min) Complex 2019

 

             Patient Education: Patient Medication Summary                      

     Completed    2019

 

                          Visit Plan:               Well Adult - pt was counsele

d about diet, exercise, and encouraged 

to follow a heart healthy diet and increase activity level. The patient was 
instructed to RTC yearly for well adult exams and PRN for acute illnesses. The 
pt was also instructed to have yearly labs for check of cholesterol, thyroid, 
chem panel, CBC, and renal functioning. Post-surgical menopause - I have advised
the pt that we will look for her medications from [facility] to find out what her depo 
dose is so I can wean her off of it. Anxiety - the patient has uncontrolled 
anxiety and will benefit from an SSRI on a daily basis to attempt control of the
symptoms of anxiety (tachycardia, overwhelming sensations, stress, insomnia, 
etc). stop clonidine due to hypotension

                                                            08/15/2019

 

             Patient Education: Patient Medication Summary                      

     Completed    08/15/2019

 

             Referral: Álvaro Sellers  Referral                  Appointment Co

nfirmed  

 

             Referral: Álvaro Sellers  Referral                  Completed     







Instructions





                                        Comment

 

                                        GENERIC ALLEGRA OTC 1 TABLET TWICE A DAY

 FOR 5 DAYS, THEN DECREASE TO ONCE A DAY



OMEPRAZOLE (GENERIC PRILOSEC) 20MG TAKE 1 DAILY X 14



CLORICIDIN HP OVER THE COUNTER DECONGESTANT AS NEEDED WHEN YOU FEEL MORE 
CONGESTED



MIRALAX 17GM 1 CAPFUL MIX WITH WATER DAILY

DUE FOR ANNUAL MAMMOGRAM- order faxed



DUE TO ANNUAL FASTING LABS- orders given at visit



CONTINUE XYZOL OR ZYRTEC



STEROID SHOT TODAY



PREDNISONE TABLETS TAKE 2 TABLETS DAILY X 5 DAYS



PHENERGAN W/CODEINE COUGH MEDICINE

                                        . Allergies - recent increase in nasal c

ongestion that has been worsening cough.

Has tried Claritin in the past without any relief. Discussed use of being 
aggressive with allergies to help decrease cough and loss of voice. Discussed 
use of Allegra twice a day x 5 days then decrease down to daily. BP elevated the
last 2 visits, so discouraged use of Sudafed. Discussed us of Cloricidin HP as 
needed for congestions. 



Laryngitis - secondary to acute bronchitis that has worsened over the last 
month. Cough has improved, but pt reports tightness when lying down. Discussed 
possible silent GERD due to increase in coughing. Recommended taking Omeprazole 
daily x 2 weeks. 



Acute Bronchitis - cough improved, denies chest congestion. Completed 2 rounds 
of antibiotics and 2 rounds of steroids. Discussed treat more aggressive with 
allergies and GERD. Pt encouraged to follow up in 2 weeks if no improvement. 



Slow transit constipation - encouraged use Miralax 1 capful daily, may titrate 
based on needed. 

Allergies exacerbation with cough - chronic - recommended pt to use allergy 
medication as prescribed.  Pt has been counseled as  to the appropriate use of 
the medication.  Pt to call if allergy symptoms are not controlled with the 
medication.

If using nasal spray, instructions as follows: Nasal spray- use twice daily, one
spray per nostril twice daily, after 30 minutes, rinse out nose with saline 
spray.. Use opposite hand per nostril to spray in the nasal steroid allergy 
spray.  Kenalog injection given at visit. Prednisone rx sent. Phenergan with 
codeine cough medicine sent. 



Screening mammogram - orders sent.



Anxiety - controlled. Continue prn alprazolam at bedtime and escitalopram. 



Fasting lab orders sent









 

                                        DUE FOR ANNUAL MAMMOGRAM- order faxed



DUE TO ANNUAL FASTING LABS- orders given at visit



CONTINUE XYZOL OR ZYRTEC



STEROID SHOT TODAY



PREDNISONE TABLETS TAKE 2 TABLETS DAILY X 5 DAYS



PHENERGAN W/CODEINE COUGH MEDICINE

                                        . Allergies exacerbation with cough - ch

ronic - recommended pt to use allergy 

medication as prescribed.  Pt has been counseled as  to the appropriate use of 
the medication.  Pt to call if allergy symptoms are not controlled with the 
medication.

If using nasal spray, instructions as follows: Nasal spray- use twice daily, one
spray per nostril twice daily, after 30 minutes, rinse out nose with saline 
spray.. Use opposite hand per nostril to spray in the nasal steroid allergy 
spray.  Kenalog injection given at visit. Prednisone rx sent. Phenergan with 
codeine cough medicine sent. 



Screening mammogram - orders sent.



Anxiety - controlled. Continue prn alprazolam at bedtime and escitalopram. 



Fasting lab orders sent







 

                                        . Allergies - chronic - recommended pt t

o use allergy medication as prescribed. 

Pt has been counseled as  to the appropriate use of the medication.  Pt to call 
if allergy symptoms are not controlled with the medication.

If using nasal spray, instructions as follows: Nasal spray- use twice daily, one
spray per nostril twice daily, after 30 minutes, rinse out nose with saline 
spray.. Use opposite hand per nostril to spray in the nasal steroid allergy 
spray.



Headache -will treat allergies/congestion - instructed patient to call if does 
not improve and we will order CT Head without contrast. 



 

                                        . Sinusitis - Pt has acute infection - p

ain in face, maxillary region, Pt 

informed to use decongestant, RX given to patient, sinus rinses also 
recommended.  Call if symptoms do not show improvement.



Allergies - chronic - recommended pt to use allergy medication as prescribed.  
Pt has been counseled as  to the appropriate use of the medication.  Pt to call 
if allergy symptoms are not controlled with the medication.

If using nasal spray, instructions as follows: Nasal spray- use twice daily, one
spray per nostril twice daily, after 30 minutes, rinse out nose with saline 
spray.. Use opposite hand per nostril to spray in the nasal steroid allergy 
spray.



Migraine - will treat for sinus infection - pt is to notify clinic if symptoms 
do not improve, if they worsen, or with any changes, questions, or concerns. 

 

                                        . Anxiety - the patient has uncontrolled

 anxiety and will benefit from a short 

term dose of xanax for control of symptoms.

 

                                        . Well Adult Female - exam completed.  P

ap and  breast exam completed.  Pt will 

be called with results of her testing.  She was advised to continue with yearly 
annual exams.   Safe sex practices discussed during office visit today.  Call if
any abnormal gynecologic issues during the next year, otherwise, RTC yearly or 
prn.



 

                                        kenalog 

                                        . Bronchitis - acute case of bronchitis 

identified.  Pt has been given 

antibiotics, breathing treatments as appropriate, and pt has been instructed to 
call if symptoms are not improved, or if symptoms acutely worsen.



 

                                        . URI -viral -flu swab to r/o flu - Pt a

dvised to increase fluids, vitamin C.  

Discussed natural and expected course of this diagnosis and need to alert me if 
symptoms do not follow expected course, or if any worse.



 

                                        . Post-surgical menopause - continue to 

taper depo dose and eventually off 

medication completely -injection today in the office. 



 

                                        tomorrow at 10:15 with the nurse praclouise benavides. Left knee, ankle, foot pain - 

will send RX - will refer to ortho - The pt is to use prn antiinflammatories to 
manage acute pain. The patient is to call the office if the pain is worsening or
does not improve. 





 

                                        . Left knee, ankle, foot pain - will sen

d RX - if no improvement will refer to 

ortho - The pt is to use prn antiinflammatories to manage acute pain. The 
patient is to call the office if the pain is worsening or does not improve. 



 

                                        . Well Adult - pt was counseled about di

et, exercise, and encouraged to follow a

heart healthy diet and increase activity level.  The patient was instructed to 
RTC yearly for well adult exams and PRN for acute illnesses.  The pt was also 
instructed to have yearly labs for check of cholesterol, thyroid, chem panel, 
CBC, and renal functioning.



Post-surgical menopause - I have advised the pt that we will look for her 
medications from [facility] to find out what her depo dose is so I can wean her off of 
it.



Anxiety - the patient has uncontrolled anxiety and will benefit from an SSRI on 
a daily basis to attempt control of the symptoms of anxiety (tachycardia, 
overwhelming sensations, stress, insomnia, etc). 

stop clonidine due to hypotension







Medical Equipment

No Medical Equipment data



Health Concerns Section

Health Concerns data not found



Goals Section

Goals data not found



Interventions Section

Interventions data not found



Health Status Evaluations/Outcomes Section

Health Status Evaluations/Outcomes data not found



Advance Directives

No Advance Directive data

## 2021-12-28 NOTE — XMS REPORT
CCD document using C-CDA

                             Created on: 12/15/2021



AyalaRosibel vegas

External Reference #: [id number]

: 1970

Sex: Female



Demographics





                          Address                   [address]

[place]  [address]

 

                          Home Phone                [phone]

 

                          Preferred Language        Unknown

 

                          Marital Status            Unknown

 

                          Gnosticism Affiliation     Unknown

 

                          Race                      White

 

                          Ethnic Group              Not  or 





Author





                          Author                    Rosibel Crump

 

                          Organization              Yesica Crump MD, [facility]

 

                          Address                   [address] [place]  [address]



 

                          Phone                     [phone]







Care Team Providers





                    Care Team Member Name Role                Phone

 

                    Yesica Crump     PP                  Unavailable

 

                          CCM                       Unavailable







Summary Purpose

Interface Exchange



Insurance Providers





             Payer name   Policy type / Coverage type Covered party ID Effective

 Begin Date 

Effective End Date

 

             [facility] Commercial Insurance [id number] 

Unknown      

Unknown







Family history





Mother



                          Diagnosis                 Age At Onset

 

                          Diabetes mellitus Type 2  Unknown

 

                          Alcoholism                Unknown

 

                          Depression                Unknown







Father



                          Diagnosis                 Age At Onset

 

                          Diabetes mellitus Type 2  Unknown







Social History





                Social History Element Codes           Description     Effective

 Dates

 

                    Marital status      Unknown             

Ha                                   08/15/2019

 

                Number of children Unknown         2               08/15/2019

 

                    Employment          Unknown             Currently employed

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

 

                Tobacco history SNOMED CT: 14555009 Current some days smoker 08/

15/2019

 

                Alcohol history SNOMED CT: 715876546 Never drinks alcohol 08/15/

2019







Allergies, Adverse Reactions, Alerts





           Substance  Reaction   Codes      Entered Date Inactivated Date Status

 

           Penicillin            Unknown    08/15/2019 No Inactive Date Active







Problems





                Condition       Codes           Effective Dates Condition Status

 

                          Chronic cough             ICD-10: R05.3

ICD-9: 786.2              2021                Active

 

                          Dysphagia                 ICD-10: r13.10

ICD-9: 787.20             2021                Active

 

                          Laryngitis                ICD-10: J04.0

ICD-9: 464.00             2021                Active

 

                          Other allergic rhinitis   ICD-10: J30.89

ICD-9: 477.8              2020                Active

 

                          Acute bronchitis          ICD-10: J20.9

ICD-9: 466.0              2019                Active

 

                          Slow transit constipation ICD-10: K59.01

ICD-9: 564.01             2021                Active

 

                          Cough                     ICD-10: R05.9

ICD-9: 786.2              10/25/2021                Active

 

                          Encounter for screening mammogram for malignant neopla

sm of breast ICD-10: 

Z12.31

ICD-9: V76.12             11/10/2020                Active

 

                          Generalized anxiety disorder ICD-10: F41.9

ICD-9: 300.00             10/25/2021                Active

 

                          Menopausal and female climacteric states ICD-10: N95.1

ICD-9: 627.2              08/15/2019                Active

 

                          Encounter for general adult medical examination withou

t abnormal findings ICD-

10: Z00.00

ICD-9: V70.0              08/15/2019                Active

 

                          Screening, lipid          ICD-10: Z13.220

ICD-9: V77.91             10/22/2020                Active

 

                          Migraine without status migrainosus, not intractable, 

unspecified migraine type 

ICD-10: G43.909

ICD-9: 346.90             2020                Active

 

                          Headache                  ICD-10: R51

ICD-9: 784.0              2020                Active

 

                          Other acute sinusitis     ICD-10: J01.80

ICD-9: 461.8              2020                Active

 

                          Generalized anxiety disorder ICD-10: F41.1

ICD-9: 300.00             08/15/2019                Active

 

                                        Encounter for gynecological examination 

(general) (routine) without abnormal 

findings                                ICD-10: Z01.419

ICD-9: V72.31             2020                Active

 

                          Cough                     ICD-10: R05

ICD-9: 786.2              2019                Active

 

                          Body aches                ICD-10: R52

ICD-9: 780.96             2019                Active

 

                          Fever                     ICD-10: R50.9

ICD-9: 780.60             2019                Active

 

                          Pain in left ankle and joints of left foot ICD-10: M25

.572

ICD-9: 719.47             2019                Active

 

                          Pain in left knee         ICD-10: M25.562

ICD-9: 719.46             2019                Active







Medications





          Medication Codes     Instructions Start Date Stop Date Status    Fill 

Instructions

 

                    promethazine 6.25 mg-codeine 10 mg/5 mL syrup RxNorm: 402293

      Take 5-10 

Milliliter(s) Oral every 4-6 hours as needed cough 12/15/2021      2021   

   Active          



 

                    escitalopram 10 mg tablet RxNorm: 198475      TAKE ONE TABLE

T BY MOUTH EVERY NIGHT AT

BEDTIME Tablet(s) Oral 12/15/2021      12/15/2021      Inactive         

 

                    promethazine 6.25 mg-codeine 10 mg/5 mL syrup RxNorm: 284790

      Take 5-10 

Milliliter(s) Oral every 4-6 hours as needed cough 12/15/2021          12/15/202

1          

Inactive                                 

 

                    albuterol sulfate HFA 90 mcg/actuation aerosol inhaler RxNor

m: 5074767     Inhale 2 

Inhalation every 4-6 hours as needed cough 2021      No Stop Date    Activ

e           

 

                    atorvastatin 20 mg tablet RxNorm: 719380      Take 1 Tablet(

s) Oral every night at 

bedtime         2021      Active           

 

                Miralax 17 gram/dose oral powder RxNorm: 690069  Take 1 scoop Or

al every day 

2021          12/15/2021          Inactive             

 

                    Allegra Allergy 180 mg tablet RxNorm: 113438      Take 1 Tab

let(s) Oral every day for

first 5 days take twice daily 2021      No Stop Date    Active           

 

                    omeprazole 20 mg tablet,delayed release RxNorm: 877321      

Take 1 Tablet(s) Oral 

every day       2021      Inactive         

 

                prednisone 20 mg tablet RxNorm: 738628  Take 2 Tablet(s) Oral ev

estevan day 

2021          Inactive             

 

                    azithromycin 250 mg tablet RxNorm: 737000      Take 1 Tablet

(s) Oral every day take 2

tablets day 1, then take 1 tablet daily on days 2-5 2021

21          

Inactive                                Please disregard Cefdinir RX.

 

                cefdinir 300 mg capsule RxNorm: 057966  Take 1 Capsule(s) Oral t

wo times a day 

2021          Inactive             

 

                    azithromycin 250 mg tablet RxNorm: 974104      Take 1 Tablet

(s) Oral every day take 2

tablets day 1, then take 1 tablet daily on days 2-5 2021

21          

Inactive                                Please disregard Cefdinir RX.

 

                    promethazine 6.25 mg-codeine 10 mg/5 mL syrup RxNorm: 950085

      Take 5 

Milliliter(s) Oral every 4-6 hours as needed cough 10/25/2021          10/25/202

1          

Inactive                                 

 

                prednisone 20 mg tablet RxNorm: 978772  Take 2 Tablet(s) Oral ev

estevan day 

10/25/2021          10/29/2021          Inactive             

 

                    promethazine 6.25 mg-codeine 10 mg/5 mL syrup RxNorm: 334390

      Take 5 

Milliliter(s) Oral every 4-6 hours as needed cough 10/25/2021          11/03/202

1          

Inactive                                 

 

                    Kenalog 40 mg/mL suspension for injection RxNorm: 7602551   

  Take 1 Milliliter(s) 

Injection       10/25/2021      10/25/2021      Inactive         

 

                    alprazolam 0.5 mg tablet RxNorm: 214135      Take 1 Tablet(s

) Oral two times a day as

needed FOR ANXIETY 10/15/2021      2021      Inactive         

 

                    alprazolam 0.5 mg tablet RxNorm: 886236      1 Tablet(s) Ora

l two times a day as 

needed anxiety  2021      Inactive         

 

                    Depo-Estradiol 5 mg/mL intramuscular oil RxNorm: 740062     

 0.75 Milliliter(s) 

Intramuscular   2021      Inactive         

 

                    alprazolam 0.5 mg tablet RxNorm: 548289      1 Tablet(s) Ora

l two times a day as 

needed anxiety  2021      Inactive         

 

                    Depo-Estradiol 5 mg/mL intramuscular oil RxNorm: 653651     

 0.75 Milliliter(s) 

Intramuscular   2021      Inactive         

 

                    alprazolam 0.5 mg tablet RxNorm: 050002      1 Tablet(s) Ora

l two times a day as 

needed anxiety  2021      Inactive         

 

                    alprazolam 0.5 mg tablet RxNorm: 947321      1 Tablet(s) Ora

l two times a day as 

needed anxiety  04/15/2021      2021      Inactive         

 

                    Depo-Provera 150 mg/mL intramuscular suspension RxNorm: 1000

128     Milliliter(s) 

Intramuscular   2021      Inactive         

 

                    alprazolam 0.5 mg tablet RxNorm: 903968      1 Tablet(s) Ora

l two times a day as 

needed anxiety  2021      Inactive         

 

                    escitalopram 10 mg tablet RxNorm: 885751      TAKE ONE TABLE

T BY MOUTH EVERY NIGHT AT

BEDTIME Tablet(s) Oral 2021      Inactive         

 

                    escitalopram 5 mg tablet RxNorm: 652019      TAKE ONE TABLET

 BY MOUTH EVERY NIGHT AT 

BEDTIME         02/15/2021      2021      Inactive         

 

                    alprazolam 0.5 mg tablet RxNorm: 176434      1 Tablet(s) Ora

l two times a day as 

needed anxiety  02/15/2021      2021      Inactive         

 

                atorvastatin 20 mg tablet RxNorm: 949279  TAKE ONE TABLET BY SHELLY

TH DAILY 

2021          Inactive             

 

                    Depo-Estradiol 5 mg/mL intramuscular oil RxNorm: 910162     

 3/4 Milliliter(s) 

Intramuscular monthly 2021      Inactive         

 

                    Depo-Provera 150 mg/mL intramuscular suspension RxNorm: 1000

128     Milliliter(s) 

Intramuscular   2021      Inactive         

 

                    alprazolam 0.5 mg tablet RxNorm: 163388      1 Tablet(s) Ora

l two times a day as 

needed anxiety  2020      Inactive         

 

                    acyclovir 400 mg tablet RxNorm: 697098      TAKE ONE TABLET 

BY MOUTH FOUR TIMES A DAY

                2020      Inactive         

 

                    alprazolam 0.5 mg tablet RxNorm: 832849      1 Tablet(s) Ora

l two times a day as 

needed anxiety  2020      12/15/2020      Inactive         

 

             atorvastatin 20 mg tablet RxNorm: 026810 1 Tablet(s) Oral every day

 2020                Inactive                   

 

             atorvastatin 20 mg tablet RxNorm: 180250 1 Tablet(s) Oral every day

 2020   

02/10/2021                Inactive                   

 

                    alprazolam 0.5 mg tablet RxNorm: 966878      1 Tablet(s) Ora

l two times a day as 

needed anxiety  10/23/2020      11/15/2020      Inactive         

 

                    Depo-Provera 150 mg/mL intramuscular suspension RxNorm: 1000

128     Milliliter(s) 

Intramuscular   10/20/2020      10/20/2020      Inactive         

 

                    acyclovir 400 mg tablet RxNorm: 922800      TAKE ONE TABLET 

BY MOUTH FOUR TIMES A DAY

                10/06/2020      11/15/2020      Inactive         

 

                    alprazolam 0.5 mg tablet RxNorm: 930232      1 Tablet(s) Ora

l two times a day as 

needed anxiety  2020      10/21/2020      Inactive         

 

                    alprazolam 0.5 mg tablet RxNorm: 931953      1 Tablet(s) Ora

l two times a day as 

needed anxiety  2020      Inactive         

 

                escitalopram 5 mg tablet RxNorm: 294186  1 Tablet(s) Oral every 

night at bedtime 

2020          Inactive             

 

             prednisone 20 mg tablet RxNorm: 979203 2 Tablet(s) Oral every day 0

2020                Inactive                   

 

                    Depo-Provera 150 mg/mL intramuscular suspension RxNorm: 1000

128     0.75 

Milliliter(s) Intramuscular 2020      Inactive         

 

                    Kenalog 40 mg/mL suspension for injection RxNorm: 8097288   

  1 Milliliter(s) 

Injection       2020      Inactive         

 

                Zithromax Z-Timur 250 mg tablet RxNorm: 854163  Tablet(s) Oral as 

directed 

2020          Inactive             

 

                    alprazolam 0.5 mg tablet RxNorm: 711499      1 Tablet(s) Ora

l two times a day as 

needed anxiety  2020      Inactive         

 

                    alprazolam 0.5 mg tablet RxNorm: 991292      1 Tablet(s) Ora

l two times a day as 

needed anxiety  2020      Inactive         

 

                    Depo-Provera 150 mg/mL intramuscular suspension RxNorm: 1000

128     Milliliter(s) 

Intramuscular   2020      Inactive         

 

                    acyclovir 400 mg tablet RxNorm: 294116      1 Tablet(s) Oral

 four times a day fever 

blisters        2020      Inactive         

 

                    Depo-Provera 150 mg/mL intramuscular suspension RxNorm: 1000

128     Milliliter(s) 

Intramuscular   2020      Inactive         

 

                pravastatin 10 mg tablet RxNorm: 107473  1 Tablet(s) Oral every 

night at bedtime 

2020          Inactive             

 

                pravastatin 10 mg tablet RxNorm: 617384  1 Tablet(s) Oral every 

night at bedtime 

2020          Inactive             

 

                    Depo-Estradiol 5 mg/mL intramuscular oil RxNorm: 727350     

 3/4 Milliliter(s) 

Intramuscular monthly 2020      Inactive         

 

                    Kenalog 40 mg/mL suspension for injection RxNorm: 6289073   

  Milliliter(s) 

Injection       2019      Inactive         

 

                    Phenergan with Codeine Syrup RxNorm:             5-10 Millil

iter(s) Oral Every 6 hrs as 

needed          2019      Inactive         

 

             prednisone 20 mg tablet RxNorm: 375505 2 Tablet(s) Oral every day 1

2019                Inactive                  start tomorrow

 

                    doxycycline hyclate 100 mg tablet RxNorm: 5651641     1 Tabl

et(s) Oral two times a 

day             2019      Inactive        start if symptom

s do not improve

 

                    Depo-Estradiol 5 mg/mL intramuscular oil RxNorm: 227076     

 3/4 Milliliter(s) 

Intramuscular monthly 2019      Inactive         

 

                    Depo-Estradiol 5 mg/mL intramuscular oil RxNorm: 189831     

 3/4 Milliliter(s) 

Intramuscular monthly 10/31/2019      2019      Inactive         

 

                    gabapentin 100 mg capsule RxNorm: 216581      1 Capsule(s) O

ral every night at 

bedtime         10/16/2019      2019      Inactive         

 

             gabapentin 100 mg capsule RxNorm: 016805 1 Capsule(s) PO QHS 09/12/

2019   

10/11/2019                Inactive                   

 

           naproxen 500 mg tablet RxNorm: 285043 1 Tablet(s) PO BID 2019 0

2019 

Inactive                                 

 

             escitalopram 5 mg tablet RxNorm: 166295 1 Tablet(s) PO QHS 08/15/20

19   2019

                          Inactive                   

 

          CoQ-10 oral RxNorm: 80462 oral      2021           Active     

 

             ranitidine 150 mg capsule RxNorm: 327909 1 Capsule(s) PO BID 2019                Inactive                   

 

             eszopiclone 1 mg tablet RxNorm: 721318 Tablet(s) PO as needed 2020                Inactive                   

 

                acyclovir 400 mg tablet RxNorm: 023663  1 Tablet(s) PO PRN fever

 blisters 

2020          Inactive             

 

             clonidine HCl 0.1 mg tablet RxNorm: 622359 Tablet(s) PO as needed 0

8/15/2019   

2019                Inactive                   







Medication Administered





             Medication   Codes        Instructions Start Date   Status

 

                Kenalog 40 mg/mL suspension for injection RxNorm: 5453588 1Milli

liter     10/25/2021

                                        No longer Active

 

                Depo-Estradiol 5 mg/mL intramuscular oil RxNorm: 259797  0.75Mil

liliter  

2021                              No longer Active

 

                Depo-Estradiol 5 mg/mL intramuscular oil RxNorm: 442159  0.75Mil

liliter  

2021                              No longer Active

 

                Depo-Provera 150 mg/mL intramuscular suspension RxNorm: 7056586 

Milliliter      

2021                              No longer Active

 

                Depo-Provera 150 mg/mL intramuscular suspension RxNorm: 0780913 

Milliliter      

2021                              No longer Active

 

                Depo-Provera 150 mg/mL intramuscular suspension RxNorm: 6626308 

Milliliter      

10/20/2020                              No longer Active

 

                Depo-Provera 150 mg/mL intramuscular suspension RxNorm: 0834585 

0.75Milliliter  

2020                              No longer Active

 

                Kenalog 40 mg/mL suspension for injection RxNorm: 8635043 1Milli

liter     2020

                                        No longer Active

 

                Depo-Provera 150 mg/mL intramuscular suspension RxNorm: 3379219 

Milliliter      

2020                              No longer Active

 

                Depo-Provera 150 mg/mL intramuscular suspension RxNorm: 6004529 

Milliliter      

2020                              No longer Active

 

                Depo-Estradiol 5 mg/mL intramuscular oil RxNorm: 908498  3/4Mill

ilitermonthly 

2020                              Active

 

             Kenalog 40 mg/mL suspension for injection RxNorm: 3221080 Millilite

r   2019   

No longer Active

 

                Depo-Estradiol 5 mg/mL intramuscular oil RxNorm: 255561  3/4Mill

ilitermonthly 

2019                              Active







Immunizations

No Immunization data



Results





          Observation Observation Code Item      Item Code Result    Date      S

ervice Location

 

          Cbc With Differential Ord2      WBC                 11.77 K/ul 

021 Unknown

 

          Cbc With Differential Ord2      RBC                 4.50 M/ul 20

21 Unknown

 

          Cbc With Differential Ord2      HGB                 13.6 g/dl 20

21 Unknown

 

          Cbc With Differential Ord2      Neut%               69.1 %    20

21 Unknown

 

          Cbc With Differential Ord2      HCT                 43.0 %    20

21 Unknown

 

          Cbc With Differential Ord2      MCV                 95.6 fl   20

21 Unknown

 

          Cbc With Differential Ord2      Lymph%              22.0 %    20

21 Unknown

 

          Cbc With Differential Ord2      MCH                 30.2 pg   20

21 Unknown

 

          Cbc With Differential Ord2      Mono%               6.0 %     20

21 Unknown

 

          Cbc With Differential Ord2      Eos%                2.6 %     20

21 Unknown

 

          Cbc With Differential Ord2      MCHC                31.6 pg   20

21 Unknown

 

          Cbc With Differential Ord2      PLT                 288 K/ul  20

21 Unknown

 

          Cbc With Differential Ord2      Baso%               0.3 %     20

21 Unknown

 

          Cbc With Differential Ord2      RDW                 13.5 %    20

21 Unknown

 

          Cbc With Differential Ord2      Neut ABS#           8.13 K/ul 20

21 Unknown

 

          Cbc With Differential Ord2      Lymph ABS#           2.59 K/ul 

021 Unknown

 

          Cbc With Differential Ord2      Mono ABS#           0.7 K/ul  20

21 Unknown

 

          Cbc With Differential Ord2      Eos ABS#            0.3 K/ul  20

21 Unknown

 

          Cbc With Differential Ord2      Baso ABS#           0.0 K/ul  20

21 Unknown

 

          Comp Metabolic Ray359    NA                  142 mEq/L 2021 [facility]

own

 

          Comp Metabolic Usv983    K                   4.4 mEq/L 2021 [facility]

own

 

          Comp Metabolic Odk780    CL                  101 mEq/L 2021 [facility]

own

 

          Comp Metabolic Zbf460    CO2                 32.0 mEq/L 2021 Unk

nown

 

          Comp Metabolic Kbg278    ANION GAP           13        2021 [facility]

own

 

          Comp Metabolic Vac358    GLUCOSE             89 mg/dL  2021 [facility]

own

 

          Comp Metabolic Tmy055    Creat               0.7 mg/dL 2021 [facility]

own

 

          Comp Metabolic Dwv127    eGFR                90 ml/min/1.73m2 20

21 Unknown

 

          Comp Metabolic Irv033    BUN                 19 mg/dL  2021 [facility]

own

 

          Comp Metabolic Luw984    B/C Ratio           26.4 Ratio 2021 Unk

nown

 

          Comp Metabolic Vyd652    CALCIUM             9.2 mg/dL 2021 [facility]

own

 

          Comp Metabolic Bas033    ALK PHOS            85 U/L    2021 [facility]

own

 

          Comp Metabolic Bjk583    AST(SGOT)           16 U/L    2021 [facility]

own

 

          Comp Metabolic Ohk418    ALT(SGPT)           19 U/L    2021 [facility]

own

 

          Comp Metabolic Rmj333    BILI T              0.6 mg/dL 2021 [facility]

own

 

          Comp Metabolic Bdl255    ALBUMIN             4.2 g/dL  2021 [facility]

own

 

          Comp Metabolic Dul967    TPRO                7.1 g/dL  2021 [facility]

own

 

          Comp Metabolic Ydg905    GLOB                2.9 g/dL  2021 [facility]

own

 

          Comp Metabolic Uzr210    A/G Ratio           1.5 Ratio 2021 [facility]

own

 

          Comp Metabolic Azd801    Osmo                285 mOsmo 2021 [facility]

own

 

          Tsh       Ord6      TSH (3rd IS)           1.14 uIU/mL 2021 [facility]

own

 

          Lipid     Ord30     CHOL                233 mg/dL 2021 Unknown

 

          Lipid     Ord30     HDL                 54.0 mg/dl 2021 Unknown

 

          Lipid     Ord30     TRIG                101 mg/dL 2021 Unknown

 

          Lipid     Ord30     LDL                 159 mg/dL 2021 Unknown

 

          Lipid     Ord30     C/HDL               4.3 Ratio 2021 Unknown

 

          Lipid     Ord30     CHOL                249 mg/dL 10/22/2020 Unknown

 

          Lipid     Ord30     HDL                 55.0 mg/dl 10/22/2020 Unknown

 

          Lipid     Ord30     TRIG                82 mg/dL  10/22/2020 Unknown

 

          Lipid     Ord30     LDL                 178 mg/dL 10/22/2020 Unknown

 

          Lipid     Ord30     C/HDL               4.5 Ratio 10/22/2020 Unknown

 

          Comp Metabolic Leu703    NA                  138 mEq/L 10/22/2020 [facility]

own

 

          Comp Metabolic Zcw431    K                   4.5 mEq/L 10/22/2020 [facility]

own

 

          Comp Metabolic Qzp515    CL                  103 mEq/L 10/22/2020 [facility]

own

 

          Comp Metabolic Jzt787    CO2                 28.0 mEq/L 10/22/2020 Unk

nown

 

          Comp Metabolic Oer222    ANION GAP           12        10/22/2020 Un[facility]

own

 

          Comp Metabolic Bll764    GLUCOSE             90 mg/dL  10/22/2020 Un[facility]

own

 

          Comp Metabolic Exa714    Creat               0.7 mg/dL 10/22/2020 Un[facility]

own

 

          Comp Metabolic Vhw924    eGFR                88 ml/min/1.73m2 10/22/20

20 Unknown

 

          Comp Metabolic Knv895    BUN                 20 mg/dL  10/22/2020 Un[facility]

own

 

          Comp Metabolic Akq328    B/C Ratio           27.0 Ratio 10/22/2020 Unk

nown

 

          Comp Metabolic Cnx689    CALCIUM             9.5 mg/dL 10/22/2020 Un[facility]

own

 

          Comp Metabolic Hcj961    ALK PHOS            87 U/L    10/22/2020 Un[facility]

own

 

          Comp Metabolic Nbx734    AST(SGOT)           17 U/L    10/22/2020 Un[facility]

own

 

          Comp Metabolic Tne422    ALT(SGPT)           20 U/L    10/22/2020 Un[facility]

own

 

          Comp Metabolic Opw922    BILI T              0.6 mg/dL 10/22/2020 Un[facility]

own

 

          Comp Metabolic Rnv479    ALBUMIN             4.5 g/dL  10/22/2020 Un[facility]

own

 

          Comp Metabolic Qvu000    TPRO                7.6 g/dL  10/22/2020 Unkn

own

 

          Comp Metabolic Ebj696    GLOB                3.1 g/dL  10/22/2020 Un[facility]

own

 

          Comp Metabolic Odf358    A/G Ratio           1.5 Ratio 10/22/2020 Unkn

own

 

          Comp Metabolic Npf718    Osmo                278 mOsmo 10/22/2020 Un[facility]

own

 

          Cbc With Differential Ord2      WBC                 10.56 K/ul 10/22/2

020 Unknown

 

          Cbc With Differential Ord2      RBC                 4.44 M/ul 10/22/20

20 Unknown

 

          Cbc With Differential Ord2      HGB                 14.0 g/dl 10/22/20

20 Unknown

 

          Cbc With Differential Ord2      HCT                 42.8 %    10/22/20

20 Unknown

 

          Cbc With Differential Ord2      Neut%               73.9 %    10/22/20

20 Unknown

 

          Cbc With Differential Ord2      MCV                 96.4 fl   10/22/20

20 Unknown

 

          Cbc With Differential Ord2      Lymph%              18.2 %    10/22/20

20 Unknown

 

          Cbc With Differential Ord2      MCH                 31.5 pg   10/22/20

20 Unknown

 

          Cbc With Differential Ord2      Mono%               5.4 %     10/22/20

20 Unknown

 

          Cbc With Differential Ord2      MCHC                32.7 pg   10/22/20

20 Unknown

 

          Cbc With Differential Ord2      Eos%                2.2 %     10/22/20

20 Unknown

 

          Cbc With Differential Ord2      PLT                 258 K/ul  10/22/20

20 Unknown

 

          Cbc With Differential Ord2      Baso%               0.3 %     10/22/20

20 Unknown

 

          Cbc With Differential Ord2      RDW                 13.4 %    10/22/20

20 Unknown

 

          Cbc With Differential Ord2      Neut ABS#           7.81 K/ul 10/22/20

20 Unknown

 

          Cbc With Differential Ord2      Lymph ABS#           1.92 K/ul 10/22/2

020 Unknown

 

          Cbc With Differential Ord2      Mono ABS#           0.6 K/ul  10/22/20

20 Unknown

 

          Cbc With Differential Ord2      Eos ABS#            0.2 K/ul  10/22/20

20 Unknown

 

          Cbc With Differential Ord2      Baso ABS#           0.0 K/ul  10/22/20

20 Unknown

 

          Tsh       Ord6      TSH (3rd IS)           1.38 uIU/mL 2020 Un[facility]

own

 

          Comp Metabolic Tay738    NA                  141 mEq/L 2020 [facility]

own

 

          Comp Metabolic Vqi914    K                   4.3 mEq/L 2020 [facility]

own

 

          Comp Metabolic Dav343    CL                  104 mEq/L 2020 [facility]

own

 

          Comp Metabolic Ogm863    CO2                 29.0 mEq/L 2020 Unk

nown

 

          Comp Metabolic Utc091    ANION GAP           12        2020 Un[facility]

own

 

          Comp Metabolic Zcc315    GLUCOSE             83 mg/dL  2020 [facility]

own

 

          Comp Metabolic Xun098    Creat               0.7 mg/dL 2020 [facility]

own

 

          Comp Metabolic Maz693    eGFR                102 ml/min/1.73m2 

020 Unknown

 

          Comp Metabolic Vbe885    BUN                 16 mg/dL  2020 [facility]

own

 

          Comp Metabolic Pvt575    B/C Ratio           24.6 Ratio 2020 Un[facility]

nown

 

          Comp Metabolic Yro236    CALCIUM             9.3 mg/dL 2020 [facility]

own

 

          Comp Metabolic Zgl955    ALK PHOS            79 U/L    2020 [facility]

own

 

          Comp Metabolic Nud760    AST(SGOT)           19 U/L    2020 [facility]

own

 

          Comp Metabolic Fer026    ALT(SGPT)           26 U/L    2020 [facility]

own

 

          Comp Metabolic Vfu873    BILI T              0.5 mg/dL 2020 [facility]

own

 

          Comp Metabolic Fby057    ALBUMIN             4.2 g/dL  2020 [facility]

own

 

          Comp Metabolic Pwh328    TPRO                7.0 g/dL  2020 [facility]

own

 

          Comp Metabolic Cgy934    GLOB                2.8 g/dL  2020 [facility]

own

 

          Comp Metabolic Xkn366    A/G Ratio           1.5 Ratio 2020 [facility]

own

 

          Comp Metabolic Ksj039    Osmo                282 mOsmo 2020 Un[facility]

own

 

          Cbc With Differential Ord2      WBC                 8.94 K/ul 20

20 Unknown

 

          Cbc With Differential Ord2      RBC                 4.34 M/ul 20

20 Unknown

 

          Cbc With Differential Ord2      HGB                 13.3 g/dl 20

20 Unknown

 

          Cbc With Differential Ord2      HCT                 40.7 %    20

20 Unknown

 

          Cbc With Differential Ord2      Neut%               71.8 %    20

20 Unknown

 

          Cbc With Differential Ord2      MCV                 93.8 fl   20

20 Unknown

 

          Cbc With Differential Ord2      Lymph%              19.5 %    20

20 Unknown

 

          Cbc With Differential Ord2      MCH                 30.6 pg   20

20 Unknown

 

          Cbc With Differential Ord2      Mono%               6.2 %     20

20 Unknown

 

          Cbc With Differential Ord2      Eos%                2.2 %     20

20 Unknown

 

          Cbc With Differential Ord2      MCHC                32.7 pg   20

20 Unknown

 

          Cbc With Differential Ord2      Baso%               0.3 %     20

20 Unknown

 

          Cbc With Differential Ord2      PLT                 262 K/ul  20

20 Unknown

 

          Cbc With Differential Ord2      RDW                 13.4 %    20

20 Unknown

 

          Cbc With Differential Ord2      Neut ABS#           6.42 K/ul 20

20 Unknown

 

          Cbc With Differential Ord2      Lymph ABS#           1.74 K/ul 

020 Unknown

 

          Cbc With Differential Ord2      Mono ABS#           0.6 K/ul  20

20 Unknown

 

          Cbc With Differential Ord2      Eos ABS#            0.2 K/ul  20

20 Unknown

 

          Cbc With Differential Ord2      Baso ABS#           0.0 K/ul  20

20 Unknown

 

          Lipid     Ord30     CHOL                240 mg/dL 2020 Unknown

 

          Lipid     Ord30     HDL                 57.0 mg/dl 2020 Unknown

 

          Lipid     Ord30     TRIG                80 mg/dL  2020 Unknown

 

          Lipid     Ord30     LDL                 167 mg/dL 2020 Unknown

 

          Lipid     Ord30     C/HDL               4.2 Ratio 2020 Unknown

 

          C A/B FLU [id number]   Influenza A Scr           Negative  2019 [facility]

nown

 

          C A/B FLU [id number]   Influenza B Scr           Negative  2019 [facility]

nown

 

           C A/B FLU  [id number]    Influenza Intrp B AG:PRID:PT:NOSE:NOM:IF       

     See Footnote  

2019                              Unknown







Procedures





                    Procedure           Codes               Date

 

                    THER/PROPH/DIAG INJ SC/IM CPT-4: 79639        2021

 

                    THER/PROPH/DIAG INJ SC/IM CPT-4: 73498        2021

 

                    THER/PROPH/DIAG INJ SC/IM CPT-4: 02671        2021

 

                    THER/PROPH/DIAG INJ SC/IM CPT-4: 24470        2021

 

                    THER/PROPH/DIAG INJ SC/IM CPT-4: 27180        10/20/2020

 

                    THER/PROPH/DIAG INJ SC/IM CPT-4: 12489        2020

 

                    TRIAMCINOLONE ACET INJ NOS 10 mg CPT-4:         

020

 

                    THER/PROPH/DIAG INJ SC/IM CPT-4: 60576        2020

 

                    THER/PROPH/DIAG INJ SC/IM CPT-4: 22729        2020

 

                    THER/PROPH/DIAG INJ SC/IM CPT-4: 51429        2020

 

                    TRIAMCINOLONE ACET INJ NOS 10 mg CPT-4:         

019

 

                    THER/PROPH/DIAG INJ SC/IM CPT-4: 90967        2019







Vital Signs





                          Date                      Vital

 

                2021      Blood Pressure 1: 110/80 Code: 8480-6 BMI: 30.2 

Code: 21875-1 Heart 

Rate 1: 88 bpm      Height: 5'2" Code: 8302-2 SpO2: 98%           Temperature: 3

6.8 (C) / 98.2 

(F)                                     Weight: 165 lbs  Code: 54829-5

 

                2021      Blood Pressure 1: 156/82 Code: 8480-6 Heart Rate

 1: 81 bpm Height: 

5'2" Code: 8302-2   Height: 5'2" Code: 8302-2 SpO2: 99%           Temperature: 3

6.2 (C) / 

97.1 (F)                                Weight:  Code: 75915-2

 

                10/25/2021      Blood Pressure 1: 142/68 Code: 8480-6 BMI: 30.7 

Code: 15448-5 Heart 

Rate 1: 63 bpm      Height: 5'2" Code: 8302-2 SpO2: 97%           Temperature: 3

6.4 (C) / 97.6 

(F)                                     Weight: 168 lbs  Code: 09234-7

 

                2020      Blood Pressure 1: 130/80 Code: 8480-6 BMI: 28.5 

Code: 58887-2 Heart 

Rate 1: 69 bpm      Height: 5'2" Code: 8302-2 SpO2: 97%           Temperature: 3

6.9 (C) / 98.4 

(F)                                     Weight: 156 lbs  Code: 88744-1

 

                2020      Blood Pressure 1: 128/78 Code: 8480-6 Heart Rate

 1: 74 bpm Height:  

Code: 8302-2              SpO2: 98%                 Weight:  Code: 84863-0

 

                    2020          Height:  Code: 8302-2 Weight:  Code: 294

63-7

 

                2020      Blood Pressure 1: 132/80 Code: 8480-6 BMI: 28.9 

Code: 95646-9 Heart 

Rate 1: 68 bpm      Height: 5'2" Code: 8302-2 SpO2: 97%           Weight: 158 lb

s  Code: 

57134-0

 

                2019      Blood Pressure 1: 110/60 Code: 8480-6 BMI: 28.5 

Code: 95331-3 Heart 

Rate 1: 76 bpm      Height: 5'2" Code: 8302-2 SpO2: 98%           Temperature: 3

6.9 (C) / 98.5 

(F)                                     Weight: 156 lbs  Code: 93898-3

 

             2019   Blood Pressure 1: 124/80 Code: 8480-6 Heart Rate 1: 81

 bpm SpO2: 98%    

Temperature: 36.7 (C) / 98.1 (F)

 

                2019      Blood Pressure 1: 130/78 Code: 8480-6 BMI: 28.0 

Code: 16743-6 Heart 

Rate 1: 68 bpm      Height: 5'2" Code: 8302-2 SpO2: 98%           Weight: 153 lb

s  Code: 

15173-3

 

                2019      Heart Rate 1: 68 bpm Height:  Code: 8302-2 Weigh

t:  Code: 11741-8

 

                2019      Blood Pressure 1: 132/74 Code: 8480-6 BMI: 27.4 

Code: 52321-6 Heart 

Rate 1: 66 bpm      Height: 5'2" Code: 8302-2 SpO2: 98%           Weight: 150 lb

s  Code: 

02514-8

 

                08/15/2019      Blood Pressure 1: 104/60 Code: 8480-6 BMI: 27.5 

Code: 34291-3 Heart 

Rate 1: 66 bpm      Height: 5'2" Code: 8302-2 SpO2: 98%           Weight: 150 lb

s 5 oz Code: 

46761-2







Functional Status

No Functional Status data



Reason For Visit





                    Reason For Visit    Effective Dates     Notes

 

                    cough               2021           

 

                    cough               2021           

 

                    sinus congestion    10/25/2021           

 

                    headache            2020           

 

                    headache            2020           

 

                    well woman exam (40-65 years) 2020           

 

                    cough               2019           

 

                    fever               2019           

 

                    menopausal symptoms 2019           

 

                    lower leg pain      2019           

 

                    lower leg pain      2019           

 

                    anxiety             08/15/2019           







Encounters





             Encounter    Performer    Location     Codes        Date

 

                                        ( EST. PATIENT, LEVEL IV

Diagnosis: Chronic cough[ICD10: R05.3]

Diagnosis: Laryngitis[ICD10: J04.0]

Diagnosis: Other allergic rhinitis[ICD10: J30.89]

Diagnosis: Dysphagia[ICD10: r13.10] Eulalia Crump MD, [facility]

PT-4: 

20169                                   2021

 

                                        [id number]) 94694 EST. PATIENT, LEVEL IV

Diagnosis: Acute bronchitis[ICD10: J20.9]

Diagnosis: Laryngitis[ICD10: J04.0]

Diagnosis: Other allergic rhinitis[ICD10: J30.89]

Diagnosis: Slow transit constipation[ICD10: K59.01] [facility]                   CPT-4: 40827              2021

 

                                        [phone]) 43479 EST. PATIENT, LEVEL IV

Diagnosis: Generalized anxiety disorder[ICD10: F41.9]

Diagnosis: Cough[ICD10: R05.9]

Diagnosis: Other allergic rhinitis[ICD10: J30.89]

Diagnosis: Encounter for screening mammogram for malignant neoplasm of 
breast[ICD10: Z12.31] Eulalia Crump MD, [facility] CPT-4: 04864    

10/25/2021

 

                                        [phone] EST. PATIENT, LEVEL III

Diagnosis: Headache[ICD10: R51]

Diagnosis: Other allergic rhinitis[ICD10: J30.89] Bree Gutierres MD, [facility]          CPT-4: 46508              2020

 

                                        70347 EST. PATIENT, LEVEL III

Diagnosis: Other acute sinusitis[ICD10: J01.80]

Diagnosis: Other allergic rhinitis[ICD10: J30.89]

Diagnosis: Migraine without status migrainosus, not intractable, unspecified 
migraine type[ICD10: G43.909]

Diagnosis: Menopausal and female climacteric states[ICD10: N95.1] Flakita Crump MD, [facility]    CPT-4: 17347              2020

 

                                        [phone]) 62507 EST. PATIENT, LEVEL III

Diagnosis: Generalized anxiety disorder[ICD10: F41.1] Yesica Crump MD, [facility]          CPT-4: 07005              2020

 

                                        ([id number]) PREV VISIT EST AGE 40-64

Diagnosis: Encounter for gynecological examination (general) (routine) without 
abnormal findings[ICD10: Z01.419] Bree Crump MD, [facility] CPT

-4:

78224                                   2020

 

                                        [phone] 75168 EST. PATIENT, LEVEL III

Diagnosis: Cough[ICD10: R05]

Diagnosis: Acute bronchitis[ICD10: J20.9] Bree carrasco MD, 

[facility]                       CPT-4: 35073              2019

 

                                        [phone]) 61155 EST. PATIENT, LEVEL III

Diagnosis: Fever[ICD10: R50.9]

Diagnosis: Body aches[ICD10: R52] Bree Crump MD, [facility] CPT

-4:

10933                                   2019

 

                                        [phone]) 60830 EST. PATIENT, LEVEL III

Diagnosis: Menopausal and female climacteric states[ICD10: N95.1] Bree Crump MD, [facility] CPT-4: 14442        2019

 

                                        63692 EST. PATIENT, LEVEL III

Diagnosis: Pain in left ankle and joints of left foot[ICD10: M25.572]

Diagnosis: Pain in left knee[ICD10: M25.562] Flakita jackson MD, [facility]

                          CPT-4: 17805              2019

 

                                        20281 EST. PATIENT, LEVEL III

Diagnosis: Pain in left ankle and joints of left foot[ICD10: M25.572]

Diagnosis: Pain in left knee[ICD10: M25.562] [facility]

                          CPT-4: 22207              2019

 

                                        ([id number]) PREV VISIT NEW AGE 40-64

Diagnosis: Encounter for general adult medical examination without abnormal 
findings[ICD10: Z00.00] [facility] CPT-4: 68904    

08/15/2019







Plan of Care





             Planned Activity Notes        Codes        Status       Date

 

                          Visit Plan:               Chronic cough/laryngitis - h

as been persistent for the last 6 

weeks, despite treatment of allergies, GERD, and URI with steroids and 
antibiotic. Will obtain CXR today and rx for Albuterol inhaler sent to pharmacy 
to see if improvement with use. Dysphagia - pt reports feeling as though food is
getting caught in her esophagus. Pt was being treated for possible silent GERD 
as has had a persistent cough despite improvement in sinus congestion and 
drainage. Per pt request will refer for EGD with Dr. Wayne. Pt reports family 
history of esophageal stricture. Allergies - pt reports sinus congestion and dra monae has significantly improved since taking Allegra daily.

                                                            2021

 

                                        Appointment: Eulalia Osman 

WPtel:[phone]

                                        [address] [facility]

[place]                                              (30 min) Complex 2021

 

             Patient Education: Patient Medication Summary                      

     Completed    2021

 

                    Care Plan: CHEST X-RAY 2VW FRONTAL&LATL                     

LOINC : 95762-2

                          Pending                   2021

 

                          Visit Plan:               Allergies - recent increase 

in nasal congestion that has been 

worsening cough. Has tried Claritin in the past without any relief. Discussed 
use of being aggressive with allergies to help decrease cough and loss of voice.
Discussed use of Allegra twice a day x 5 days then decrease down to daily. BP 
elevated the last 2 visits, so discouraged use of Sudafed. Discussed us of 
Cloricidin HP as needed for congestions. Laryngitis - secondary to acute 
bronchitis that has worsened over the last month. Cough has improved, but pt 
reports tightness when lying down. Discussed possible silent GERD due to increas
e in coughing. Recommended taking Omeprazole daily x 2 weeks. Acute Bronchitis -
cough improved, denies chest congestion. Completed 2 rounds of antibiotics and 2
rounds of steroids. Discussed treat more aggressive with allergies and GERD. Pt 
encouraged to follow up in 2 weeks if no improvement. Slow transit constipation 
- encouraged use Miralax 1 capful daily, may titrate based on needed. Allergies 
exacerbation with cough - chronic - recommended pt to use allergy medication as 
prescribed. Pt has been counseled as to the appropriate use of the medication. 
Pt to call if allergy symptoms are not controlled with the medication. If using 
nasal spray, instructions as follows: Nasal spray- use twice daily, one spray 
per nostril twice daily, after 30 minutes, rinse out nose with saline spray.. 
Use opposite hand per nostril to spray in the nasal steroid allergy spray. 
Kenalog injection given at visit. Prednisone rx sent. Phenergan with codeine 
cough medicine sent. Screening mammogram - orders sent. Anxiety - controlled. 
Continue prn alprazolam at bedtime and escitalopram. Fasting lab orders sent

                                                            2021

 

                                        Appointment: Eulalia Osman 

WPtel:[phone] [address] [facility]

                                              (30 min) Complex 2021

 

             Patient Education: Patient Medication Summary                      

     Completed    2021

 

             Patient Education: Patient Medication Summary                      

     Completed    2021

 

                          Visit Plan:               Allergies exacerbation with 

cough - chronic - recommended pt to use

allergy medication as prescribed. Pt has been counseled as to the appropriate 
use of the medication. Pt to call if allergy symptoms are not controlled with 
the medication. If using nasal spray, instructions as follows: Nasal spray- use 
twice daily, one spray per nostril twice daily, after 30 minutes, rinse out nose
with saline spray.. Use opposite hand per nostril to spray in the nasal steroid 
allergy spray. Kenalog injection given at visit. Prednisone rx sent. Phenergan 
with codeine cough medicine sent. Screening mammogram - orders sent. Anxiety - 
controlled. Continue prn alprazolam at bedtime and escitalopram. Fasting lab 
orders sent

                                                            10/25/2021

 

                                        Appointment: Eulalia Osman 

WPtel:[phone] [address] [facility]

[place]                                              (30 min) Complex 10/25/2021

 

             Patient Education: Patient Medication Summary                      

     Completed    10/25/2021

 

             Patient Education: prednisone- [employer] [id number]         

                  Completed    

10/25/2021

 

             Appointment:                            Injection    2021

 

             Patient Education: Patient Medication Summary                      

     Completed    2021

 

             Appointment:                            Injection    2021

 

             Patient Education: Patient Medication Summary                      

     Completed    2021

 

             Appointment:                            Injection    2021

 

             Appointment:                            Injection    2021

 

             Patient Education: Patient Medication Summary                      

     Completed    2021

 

             Appointment:                            Injection    2021

 

             Patient Education: Patient Medication Summary                      

     Completed    2021

 

             Patient Education: Patient Medication Summary                      

     Completed    11/10/2020

 

             Patient Education: Patient Medication Summary                      

     Completed    10/22/2020

 

             Appointment:                            Injection    10/20/2020

 

             Patient Education: Patient Medication Summary                      

     Completed    10/20/2020

 

                          Visit Plan:               Allergies - chronic - recomm

ended pt to use allergy medication as 

prescribed. Pt has been counseled as to the appropriate use of the medication. 
Pt to call if allergy symptoms are not controlled with the medication. If using 
nasal spray, instructions as follows: Nasal spray- use twice daily, one spray 
per nostril twice daily, after 30 minutes, rinse out nose with saline spray.. 
Use opposite hand per nostril to spray in the nasal steroid allergy spray. 
Headache -will treat allergies/congestion - instructed patient to call if does 
not improve and we will order CT Head without contrast.

                                                            2020

 

                                        Appointment: Bree Oleary 

WPtel:[phone]

                                        [address] [place], [place] [id number]

                                              (15 min) Moderate 2020

 

             Patient Education: Patient Medication Summary                      

     Completed    2020

 

                          Visit Plan:               Sinusitis - Pt has acute inf

ection - pain in face, maxillary 

region, Pt informed to use decongestant, RX given to patient, sinus rinses also 
recommended. Call if symptoms do not show improvement. Allergies - chronic - 
recommended pt to use allergy medication as prescribed. Pt has been counseled as
to the appropriate use of the medication. Pt to call if allergy symptoms are not
controlled with the medication. If using nasal spray, instructions as follows: 
Nasal spray- use twice daily, one spray per nostril twice daily, after 30 
minutes, rinse out nose with saline spray.. Use opposite hand per nostril to 
spray in the nasal steroid allergy spray. Migraine - will treat for sinus 
infection - pt is to notify clinic if symptoms do not improve, if they worsen, 
or with any changes, questions, or concerns.

                                                            2020

 

                                        Appointment: Flakita Castro 

WPtel:[phone]

                                        [facility]

                                              (30 min) Complex 2020

 

             Patient Education: Patient Medication Summary                      

     Completed    2020

 

                          Visit Plan:               Anxiety - the patient has un

controlled anxiety and will benefit 

from a short term dose of xanax for control of symptoms.

                                                            2020

 

                                        Appointment: Yesica Crump 

WPtel:[phone]

                                        [address] [place], [place] [id number]

                                              TeleHealth      2020

 

             Patient Education: Patient Medication Summary                      

     Completed    2020

 

             Appointment:                            Injection    2020

 

             Patient Education: Patient Medication Summary                      

     Completed    2020

 

             Appointment:                            Injection    2020

 

             Appointment:                            Injection    2020

 

             Patient Education: Patient Medication Summary                      

     Completed    2020

 

                                        Appointment: Yesica Crump 

WPtel:[phone]

                                        [address] [place], [place] [id number]

                                              Well Woman      03/10/2020

 

                          Visit Plan:               Well Adult Female - exam com

pleted. Pap and breast exam completed. 

Pt will be called with results of her testing. She was advised to continue with 
yearly annual exams. Safe sex practices discussed during office visit today. 
Call if any abnormal gynecologic issues during the next year, otherwise, RTC 
yearly or prn.

                                                            2020

 

                                        Appointment: Bree Oleary 

WPtel:[phone]

                                        [facility]

                                              Well Woman      2020

 

             Patient Education: Patient Medication Summary                      

     Completed    2020

 

                                        Appointment: Yesica Crump 

WPtel:[phone]

                                        [address] [place], [place] [id number]

                                              (15 min) Moderate 2020

 

             Appointment:                            Injection    2020

 

             Patient Education: Patient Medication Summary                      

     Completed    2020

 

                          Visit Plan:               Bronchitis - acute case of b

ronchitis identified. Pt has been given

antibiotics, breathing treatments as appropriate, and pt has been instructed to 
call if symptoms are not improved, or if symptoms acutely worsen.

                                                            2019

 

                                        Appointment: Bree Oleary 

WPtel:[phone]

                                        [facility]

[place]                                              (15 min) Moderate 2019

 

             Patient Education: Patient Medication Summary                      

     Completed    2019

 

                          Visit Plan:               URI -viral -flu swab to r/o 

flu - Pt advised to increase fluids, 

vitamin C. Discussed natural and expected course of this diagnosis and need to 
alert me if symptoms do not follow expected course, or if any worse.

                                                            2019

 

                                        Appointment: Bree Oleary 

WPtel:[phone]

                                        [address] [place], [place] [id number]

[place]                                              (30 min) Complex 2019

 

             Patient Education: Patient Medication Summary                      

     Completed    2019

 

                          Visit Plan:               Post-surgical menopause - co

ntinue to taper depo dose and 

eventually off medication completely -injection today in the office.

                                                            2019

 

                                        Appointment: Bree Oleary 

WPtel:[phone]

                                        [facility]

[place]                                              (15 min) Moderate 2019

 

             Patient Education: Patient Medication Summary                      

     Completed    2019

 

                                        Appointment: Bree Oleary 

WPtel:[phone]

                                        [facility]

[place]                                              (30 min) Complex 2019

 

                          Visit Plan:               Left knee, ankle, foot pain 

- will send RX - will refer to ortho - 

The pt is to use prn antiinflammatories to manage acute pain. The patient is to 
call the office if the pain is worsening or does not improve.

                                                            2019

 

                                        Appointment: Flakita Castro 

WPtel:[phone]

                                        [address] [place], [place] [id number]

[place]                                              (30 min) Complex 2019

 

             Patient Education: Patient Medication Summary                      

     Completed    2019

 

                    Care Plan: Referral Order                     SNOMED-CT : 30

[id number]

                          Pending                   2019

 

                          Visit Plan:               Left knee, ankle, foot pain 

- will send RX - if no improvement will

refer to ortho - The pt is to use prn antiinflammatories to manage acute pain. 
The patient is to call the office if the pain is worsening or does not improve.

                                                            2019

 

                                        Appointment: Flakita Castro 

WPtel:[phone]

                                        [address] [place], [place] [id number]

[place]                                              (30 min) Complex 2019

 

             Patient Education: Patient Medication Summary                      

     Completed    2019

 

                          Visit Plan:               Well Adult - pt was counsele

d about diet, exercise, and encouraged 

to follow a heart healthy diet and increase activity level. The patient was 
instructed to RTC yearly for well adult exams and PRN for acute illnesses. The 
pt was also instructed to have yearly labs for check of cholesterol, thyroid, 
chem panel, CBC, and renal functioning. Post-surgical menopause - I have advised
the pt that we will look for her medications from [facility] to find out what her depo 
dose is so I can wean her off of it. Anxiety - the patient has uncontrolled 
anxiety and will benefit from an SSRI on a daily basis to attempt control of the
symptoms of anxiety (tachycardia, overwhelming sensations, stress, insomnia, 
etc). stop clonidine due to hypotension

                                                            08/15/2019

 

             Patient Education: Patient Medication Summary                      

     Completed    08/15/2019

 

                                        Referral: Neo Wayne 

HPtel:[phone]

                                        [address] [facility]

[place]              Referral                        Appointment Requested  

 

             Referral: Álvaro Sellers  Referral                  Appointment Co

nfirmed  

 

             Referral: Álvaro Sellers  Referral                  Completed     







Instructions





                          Comment                   Date

 

                                        CHEST XRAY TODAY

ALBUTEROL INHALER 2 PUFFS EVERY 4-6 HOURS AS NEEDED

WILL SEND REFERRAL TO DR WAYNE FOR EGD

WILL DISCUSS NEXT FOLLOW UP AFTER CHEST XRAY AND EFFECTIVENESS OF INHALER. CALL 
WITH UPDATE IN 1 WEEK.

                                        . Chronic cough/laryngitis - has been pe

rsistent for the last 6 weeks, despite 

treatment of allergies, GERD, and URI with steroids and antibiotic. Will obtain 
CXR today and rx for Albuterol inhaler sent to pharmacy to see if improvement 
with use. 



Dysphagia - pt reports feeling as though food is getting caught in her 
esophagus. Pt was being treated for possible silent GERD as has had a persistent
cough despite improvement in sinus congestion and drainage. Per pt request will 
refer for EGD with Dr. Wayne. Pt reports family history of esophageal 
stricture. 



Allergies - pt reports sinus congestion and drainage has significantly improved 
since taking Allegra daily.             2021

 

                                        GENERIC ALLEGRA OTC 1 TABLET TWICE A DAY

 FOR 5 DAYS, THEN DECREASE TO ONCE A DAY



OMEPRAZOLE (GENERIC PRILOSEC) 20MG TAKE 1 DAILY X 14



CLORICIDIN HP OVER THE COUNTER DECONGESTANT AS NEEDED WHEN YOU FEEL MORE 
CONGESTED



MIRALAX 17GM 1 CAPFUL MIX WITH WATER DAILY

DUE FOR ANNUAL MAMMOGRAM- order faxed



DUE TO ANNUAL FASTING LABS- orders given at visit



CONTINUE XYZOL OR ZYRTEC



STEROID SHOT TODAY



PREDNISONE TABLETS TAKE 2 TABLETS DAILY X 5 DAYS



PHENERGAN W/CODEINE COUGH MEDICINE

                                        . Allergies - recent increase in nasal c

ongestion that has been worsening cough.

Has tried Claritin in the past without any relief. Discussed use of being 
aggressive with allergies to help decrease cough and loss of voice. Discussed 
use of Allegra twice a day x 5 days then decrease down to daily. BP elevated the
last 2 visits, so discouraged use of Sudafed. Discussed us of Cloricidin HP as 
needed for congestions. 



Laryngitis - secondary to acute bronchitis that has worsened over the last 
month. Cough has improved, but pt reports tightness when lying down. Discussed 
possible silent GERD due to increase in coughing. Recommended taking Omeprazole 
daily x 2 weeks. 



Acute Bronchitis - cough improved, denies chest congestion. Completed 2 rounds 
of antibiotics and 2 rounds of steroids. Discussed treat more aggressive with 
allergies and GERD. Pt encouraged to follow up in 2 weeks if no improvement. 



Slow transit constipation - encouraged use Miralax 1 capful daily, may titrate 
based on needed. 

Allergies exacerbation with cough - chronic - recommended pt to use allergy 
medication as prescribed.  Pt has been counseled as  to the appropriate use of 
the medication.  Pt to call if allergy symptoms are not controlled with the 
medication.

If using nasal spray, instructions as follows: Nasal spray- use twice daily, one
spray per nostril twice daily, after 30 minutes, rinse out nose with saline 
spray.. Use opposite hand per nostril to spray in the nasal steroid allergy 
spray.  Kenalog injection given at visit. Prednisone rx sent. Phenergan with 
codeine cough medicine sent. 



Screening mammogram - orders sent.



Anxiety - controlled. Continue prn alprazolam at bedtime and escitalopram. 



Fasting lab orders sent







                                        2021

 

                                        DUE FOR ANNUAL MAMMOGRAM- order faxed



DUE TO ANNUAL FASTING LABS- orders given at visit



CONTINUE XYZOL OR ZYRTEC



STEROID SHOT TODAY



PREDNISONE TABLETS TAKE 2 TABLETS DAILY X 5 DAYS



PHENERGAN W/CODEINE COUGH MEDICINE

                                        . Allergies exacerbation with cough - ch

ron - recommended pt to use allergy 

medication as prescribed.  Pt has been counseled as  to the appropriate use of 
the medication.  Pt to call if allergy symptoms are not controlled with the 
medication.

If using nasal spray, instructions as follows: Nasal spray- use twice daily, one
spray per nostril twice daily, after 30 minutes, rinse out nose with saline 
spray.. Use opposite hand per nostril to spray in the nasal steroid allergy 
spray.  Kenalog injection given at visit. Prednisone rx sent. Phenergan with 
codeine cough medicine sent. 



Screening mammogram - orders sent.



Anxiety - controlled. Continue prn alprazolam at bedtime and escitalopram. 



Fasting lab orders sent





                                        10/25/2021

 

                                        . Allergies - chronic - recommended pt t

o use allergy medication as prescribed. 

Pt has been counseled as  to the appropriate use of the medication.  Pt to call 
if allergy symptoms are not controlled with the medication.

If using nasal spray, instructions as follows: Nasal spray- use twice daily, one
spray per nostril twice daily, after 30 minutes, rinse out nose with saline 
spray.. Use opposite hand per nostril to spray in the nasal steroid allergy 
spray.



Headache -will treat allergies/congestion - instructed patient to call if does 
not improve and we will order CT Head without contrast. 

                                        2020

 

                                        . Sinusitis - Pt has acute infection - p

ain in face, maxillary region, Pt 

informed to use decongestant, RX given to patient, sinus rinses also 
recommended.  Call if symptoms do not show improvement.



Allergies - chronic - recommended pt to use allergy medication as prescribed.  
Pt has been counseled as  to the appropriate use of the medication.  Pt to call 
if allergy symptoms are not controlled with the medication.

If using nasal spray, instructions as follows: Nasal spray- use twice daily, one
spray per nostril twice daily, after 30 minutes, rinse out nose with saline 
spray.. Use opposite hand per nostril to spray in the nasal steroid allergy 
spray.



Migraine - will treat for sinus infection - pt is to notify clinic if symptoms 
do not improve, if they worsen, or with any changes, questions, or concerns.  

2020

 

                                        . Anxiety - the patient has uncontrolled

 anxiety and will benefit from a short 

term dose of xanax for control of symptoms. 2020

 

                                        . Well Adult Female - exam completed.  P

ap and  breast exam completed.  Pt will 

be called with results of her testing.  She was advised to continue with yearly 
annual exams.   Safe sex practices discussed during office visit today.  Call if
any abnormal gynecologic issues during the next year, otherwise, RTC yearly or 
prn.

                                        2020

 

                                        kenalog 

                                        . Bronchitis - acute case of bronchitis 

identified.  Pt has been given 

antibiotics, breathing treatments as appropriate, and pt has been instructed to 
call if symptoms are not improved, or if symptoms acutely worsen.

                                        2019

 

                                        . URI -viral -flu swab to r/o flu - Pt a

dvised to increase fluids, vitamin C.  

Discussed natural and expected course of this diagnosis and need to alert me if 
symptoms do not follow expected course, or if any worse.

                                        2019

 

                                        . Post-surgical menopause - continue to 

taper depo dose and eventually off 

medication completely -injection today in the office. 

                                        2019

 

                                        tomorrow at 10:15 with the nurse praclouise benavides. Left knee, ankle, foot pain - 

will send RX - will refer to ortho - The pt is to use prn antiinflammatories to 
manage acute pain. The patient is to call the office if the pain is worsening or
does not improve. 



                                        2019

 

                                        . Left knee, ankle, foot pain - will sen

d RX - if no improvement will refer to 

ortho - The pt is to use prn antiinflammatories to manage acute pain. The 
patient is to call the office if the pain is worsening or does not improve. 

                                        2019

 

                                        . Well Adult - pt was counseled about di

et, exercise, and encouraged to follow a

heart healthy diet and increase activity level.  The patient was instructed to 
RTC yearly for well adult exams and PRN for acute illnesses.  The pt was also 
instructed to have yearly labs for check of cholesterol, thyroid, chem panel, 
CBC, and renal functioning.



Post-surgical menopause - I have advised the pt that we will look for her 
medications from [facility] to find out what her depo dose is so I can wean her off of 
it.



Anxiety - the patient has uncontrolled anxiety and will benefit from an SSRI on 
a daily basis to attempt control of the symptoms of anxiety (tachycardia, 
overwhelming sensations, stress, insomnia, etc). 

stop clonidine due to hypotension       08/15/2019







Medical Equipment

No Medical Equipment data



Health Concerns Section

Health Concerns data not found



Goals Section

Goals data not found



Interventions Section

Interventions data not found



Health Status Evaluations/Outcomes Section

Health Status Evaluations/Outcomes data not found



Advance Directives

No Advance Directive data

## 2021-12-28 NOTE — XMS REPORT
CCD document using C-CDA

                             Created on: 2021



AyalaRosibel vegas

External Reference #: [id number]

: 1970

Sex: Female



Demographics





                          Address                   [address]

[place] [id number]

 

                          Home Phone                [phone]

 

                          Preferred Language        Unknown

 

                          Marital Status            Unknown

 

                          Quaker Affiliation     Unknown

 

                          Race                      White

 

                          Ethnic Group              Not  or 





Author





                          Author                    Rosibel Crump

 

                          Organization              Yesica Crump MD, KAREN

 

                          Address                   [address] [place]  [address]



 

                          Phone                     [phone]







Care Team Providers





                    Care Team Member Name Role                Phone

 

                    Yesica Crump     PP                  Unavailable

 

                          CCM                       Unavailable







Summary Purpose

Interface Exchange



Insurance Providers





             Payer name   Policy type / Coverage type Covered party ID Effective

 Begin Date 

Effective End Date

 

             [facility] Commercial Insurance [id number] 

Unknown      

Unknown







Family history





Mother



                          Diagnosis                 Age At Onset

 

                          Diabetes mellitus Type 2  Unknown

 

                          Alcoholism                Unknown

 

                          Depression                Unknown







Father



                          Diagnosis                 Age At Onset

 

                          Diabetes mellitus Type 2  Unknown







Social History





                Social History Element Codes           Description     Effective

 Dates

 

                    Marital status      Unknown             

Ha                                   08/15/2019

 

                Number of children Unknown         2               08/15/2019

 

                    Employment          Unknown             Currently employed

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

 

                Tobacco history SNOMED CT: 47677557 Current some days smoker 08/

15/2019

 

                Alcohol history SNOMED CT: 249177920 Never drinks alcohol 08/15/

2019







Allergies, Adverse Reactions, Alerts





           Substance  Reaction   Codes      Entered Date Inactivated Date Status

 

           Penicillin            Unknown    08/15/2019 No Inactive Date Active







Problems





                Condition       Codes           Effective Dates Condition Status

 

                          Acute bronchitis          ICD-10: J20.9

ICD-9: 466.0              2019                Active

 

                          Laryngitis                ICD-10: J04.0

ICD-9: 464.00             2021                Active

 

                          Other allergic rhinitis   ICD-10: J30.89

ICD-9: 477.8              2020                Active

 

                          Slow transit constipation ICD-10: K59.01

ICD-9: 564.01             2021                Active

 

                          Cough                     ICD-10: R05.9

ICD-9: 786.2              10/25/2021                Active

 

                          Encounter for screening mammogram for malignant neopla

sm of breast ICD-10: 

Z12.31

ICD-9: V76.12             11/10/2020                Active

 

                          Generalized anxiety disorder ICD-10: F41.9

ICD-9: 300.00             10/25/2021                Active

 

                          Menopausal and female climacteric states ICD-10: N95.1

ICD-9: 627.2              08/15/2019                Active

 

                          Encounter for general adult medical examination withou

t abnormal findings ICD-

10: Z00.00

ICD-9: V70.0              08/15/2019                Active

 

                          Screening, lipid          ICD-10: Z13.220

ICD-9: V77.91             10/22/2020                Active

 

                          Migraine without status migrainosus, not intractable, 

unspecified migraine type 

ICD-10: G43.909

ICD-9: 346.90             2020                Active

 

                          Headache                  ICD-10: R51

ICD-9: 784.0              2020                Active

 

                          Other acute sinusitis     ICD-10: J01.80

ICD-9: 461.8              2020                Active

 

                          Generalized anxiety disorder ICD-10: F41.1

ICD-9: 300.00             08/15/2019                Active

 

                                        Encounter for gynecological examination 

(general) (routine) without abnormal 

findings                                ICD-10: Z01.419

ICD-9: V72.31             2020                Active

 

                          Cough                     ICD-10: R05

ICD-9: 786.2              2019                Active

 

                          Body aches                ICD-10: R52

ICD-9: 780.96             2019                Active

 

                          Fever                     ICD-10: R50.9

ICD-9: 780.60             2019                Active

 

                          Pain in left ankle and joints of left foot ICD-10: M25

.572

ICD-9: 719.47             2019                Active

 

                          Pain in left knee         ICD-10: M25.562

ICD-9: 719.46             2019                Active







Medications





          Medication Codes     Instructions Start Date Stop Date Status    Fill 

Instructions

 

                Miralax 17 gram/dose oral powder RxNorm: 752420  Take 1 scoop Or

al every day 

2021          12/15/2021          Active               

 

                    Allegra Allergy 180 mg tablet RxNorm: 027369      Take 1 Tab

let(s) Oral every day for

first 5 days take twice daily 2021      No Stop Date    Active           

 

                    omeprazole 20 mg tablet,delayed release RxNorm: 354887      

Take 1 Tablet(s) Oral 

every day       2021      Active           

 

                prednisone 20 mg tablet RxNorm: 818639  Take 2 Tablet(s) Oral ev

estevan day 

2021          2021          Inactive             

 

                    azithromycin 250 mg tablet RxNorm: 980542      Take 1 Tablet

(s) Oral every day take 2

tablets day 1, then take 1 tablet daily on days 2-5 2021          

Inactive                                Please disregard Cefdinir RX.

 

                cefdinir 300 mg capsule RxNorm: 453939  Take 1 Capsule(s) Oral t

wo times a day 

2021          Inactive             

 

                    azithromycin 250 mg tablet RxNorm: 220886      Take 1 Tablet

(s) Oral every day take 2

tablets day 1, then take 1 tablet daily on days 2-5 2021          

Inactive                                Please disregard Cefdinir RX.

 

                    promethazine 6.25 mg-codeine 10 mg/5 mL syrup RxNorm: 403079

      Take 5 

Milliliter(s) Oral every 4-6 hours as needed cough 10/25/2021          10/25/202

1          

Inactive                                 

 

                prednisone 20 mg tablet RxNorm: 059214  Take 2 Tablet(s) Oral ev

estevan day 

10/25/2021          10/29/2021          Inactive             

 

                    promethazine 6.25 mg-codeine 10 mg/5 mL syrup RxNorm: 056879

      Take 5 

Milliliter(s) Oral every 4-6 hours as needed cough 10/25/2021          11/03/202

1          

Inactive                                 

 

                    Kenalog 40 mg/mL suspension for injection RxNorm: 3168625   

  Take 1 Milliliter(s) 

Injection       10/25/2021      10/25/2021      Inactive         

 

                    alprazolam 0.5 mg tablet RxNorm: 670251      Take 1 Tablet(s

) Oral two times a day as

needed FOR ANXIETY 10/15/2021      2021      Inactive         

 

                    alprazolam 0.5 mg tablet RxNorm: 487948      1 Tablet(s) Ora

l two times a day as 

needed anxiety  2021      Inactive         

 

                    Depo-Estradiol 5 mg/mL intramuscular oil RxNorm: 615215     

 0.75 Milliliter(s) 

Intramuscular   2021      Inactive         

 

                    alprazolam 0.5 mg tablet RxNorm: 090041      1 Tablet(s) Ora

l two times a day as 

needed anxiety  2021      Inactive         

 

                    Depo-Estradiol 5 mg/mL intramuscular oil RxNorm: 171709     

 0.75 Milliliter(s) 

Intramuscular   2021      Inactive         

 

                    alprazolam 0.5 mg tablet RxNorm: 706257      1 Tablet(s) Ora

l two times a day as 

needed anxiety  2021      Inactive         

 

                    alprazolam 0.5 mg tablet RxNorm: 533202      1 Tablet(s) Ora

l two times a day as 

needed anxiety  04/15/2021      2021      Inactive         

 

                    Depo-Provera 150 mg/mL intramuscular suspension RxNorm: 1000

128     Milliliter(s) 

Intramuscular   2021      Inactive         

 

                    escitalopram 10 mg tablet RxNorm: 625638      TAKE ONE TABLE

T BY MOUTH EVERY NIGHT AT

BEDTIME Tablet(s) Oral 2021      Inactive         

 

                    alprazolam 0.5 mg tablet RxNorm: 651650      1 Tablet(s) Ora

l two times a day as 

needed anxiety  2021      Inactive         

 

                    escitalopram 5 mg tablet RxNorm: 329817      TAKE ONE TABLET

 BY MOUTH EVERY NIGHT AT 

BEDTIME         02/15/2021      2021      Inactive         

 

                    alprazolam 0.5 mg tablet RxNorm: 705226      1 Tablet(s) Ora

l two times a day as 

needed anxiety  02/15/2021      2021      Inactive         

 

                atorvastatin 20 mg tablet RxNorm: 720445  TAKE ONE TABLET BY SHELLY

TH DAILY 

2021          No Stop Date        Active               

 

                    Depo-Estradiol 5 mg/mL intramuscular oil RxNorm: 303620     

 3/4 Milliliter(s) 

Intramuscular monthly 2021      Inactive         

 

                    Depo-Provera 150 mg/mL intramuscular suspension RxNorm: 1000

128     Milliliter(s) 

Intramuscular   2021      Inactive         

 

                    alprazolam 0.5 mg tablet RxNorm: 827443      1 Tablet(s) Ora

l two times a day as 

needed anxiety  2020      Inactive         

 

                    acyclovir 400 mg tablet RxNorm: 1973      TAKE ONE TABLET 

BY MOUTH FOUR TIMES A DAY

                2020      Inactive         

 

                    alprazolam 0.5 mg tablet RxNorm: 163282      1 Tablet(s) Ora

l two times a day as 

needed anxiety  2020      12/15/2020      Inactive         

 

             atorvastatin 20 mg tablet RxNorm: 186347 1 Tablet(s) Oral every day

 2020                Inactive                   

 

             atorvastatin 20 mg tablet RxNorm: 354889 1 Tablet(s) Oral every day

 2020   

02/10/2021                Inactive                   

 

                    alprazolam 0.5 mg tablet RxNorm: 622392      1 Tablet(s) Ora

l two times a day as 

needed anxiety  10/23/2020      11/15/2020      Inactive         

 

                    Depo-Provera 150 mg/mL intramuscular suspension RxNorm: 1000

128     Milliliter(s) 

Intramuscular   10/20/2020      10/20/2020      Inactive         

 

                    acyclovir 400 mg tablet RxNorm: 034901      TAKE ONE TABLET 

BY MOUTH FOUR TIMES A DAY

                10/06/2020      11/15/2020      Inactive         

 

                    alprazolam 0.5 mg tablet RxNorm: 762242      1 Tablet(s) Ora

l two times a day as 

needed anxiety  2020      10/21/2020      Inactive         

 

                    alprazolam 0.5 mg tablet RxNorm: 579531      1 Tablet(s) Ora

l two times a day as 

needed anxiety  2020      Inactive         

 

                escitalopram 5 mg tablet RxNorm: 945270  1 Tablet(s) Oral every 

night at bedtime 

2020          Inactive             

 

             prednisone 20 mg tablet RxNorm: 596456 2 Tablet(s) Oral every day 0

2020                Inactive                   

 

                    Depo-Provera 150 mg/mL intramuscular suspension RxNorm: 1000

128     0.75 

Milliliter(s) Intramuscular 2020      Inactive         

 

                    Kenalog 40 mg/mL suspension for injection RxNorm: 8622597   

  1 Milliliter(s) 

Injection       2020      Inactive         

 

                Zithromax Z-Timur 250 mg tablet RxNorm: 913490  Tablet(s) Oral as 

directed 

2020          Inactive             

 

                    alprazolam 0.5 mg tablet RxNorm: 659673      1 Tablet(s) Ora

l two times a day as 

needed anxiety  2020      Inactive         

 

                    alprazolam 0.5 mg tablet RxNorm: 021724      1 Tablet(s) Ora

l two times a day as 

needed anxiety  2020      Inactive         

 

                    Depo-Provera 150 mg/mL intramuscular suspension RxNorm: 1000

128     Milliliter(s) 

Intramuscular   2020      Inactive         

 

                    acyclovir 400 mg tablet RxNorm: 368068      1 Tablet(s) Oral

 four times a day fever 

blisters        2020      Inactive         

 

                    Depo-Provera 150 mg/mL intramuscular suspension RxNorm: 1000

128     Milliliter(s) 

Intramuscular   2020      Inactive         

 

                pravastatin 10 mg tablet RxNorm: 049404  1 Tablet(s) Oral every 

night at bedtime 

2020          Inactive             

 

                pravastatin 10 mg tablet RxNorm: 930282  1 Tablet(s) Oral every 

night at bedtime 

2020          Inactive             

 

                    Depo-Estradiol 5 mg/mL intramuscular oil RxNorm: 405214     

 3/4 Milliliter(s) 

Intramuscular monthly 2020      Inactive         

 

                    Kenalog 40 mg/mL suspension for injection RxNorm: 6486119   

  Milliliter(s) 

Injection       2019      Inactive         

 

                    Phenergan with Codeine Syrup RxNorm:             5-10 Millil

iter(s) Oral Every 6 hrs as 

needed          2019      Inactive         

 

             prednisone 20 mg tablet RxNorm: 411640 2 Tablet(s) Oral every day 1

2019                Inactive                  start tomorrow

 

                    doxycycline hyclate 100 mg tablet RxNorm: 1790284     1 Tabl

et(s) Oral two times a 

day             2019      Inactive        start if symptom

s do not improve

 

                    Depo-Estradiol 5 mg/mL intramuscular oil RxNorm: 479782     

 3/4 Milliliter(s) 

Intramuscular monthly 2019      Inactive         

 

                    Depo-Estradiol 5 mg/mL intramuscular oil RxNorm: 175015     

 3/4 Milliliter(s) 

Intramuscular monthly 10/31/2019      2019      Inactive         

 

                    gabapentin 100 mg capsule RxNorm: 311938      1 Capsule(s) O

ral every night at 

bedtime         10/16/2019      2019      Inactive         

 

             gabapentin 100 mg capsule RxNorm: 495558 1 Capsule(s) PO QHS 09/12/

2019   

10/11/2019                Inactive                   

 

           naproxen 500 mg tablet RxNorm: 591718 1 Tablet(s) PO BID 2019 0

2019 

Inactive                                 

 

             escitalopram 5 mg tablet RxNorm: 884989 1 Tablet(s) PO QHS 08/15/20

19   2019

                          Inactive                   

 

             ranitidine 150 mg capsule RxNorm: 764998 1 Capsule(s) PO BID 2019                Inactive                   

 

             eszopiclone 1 mg tablet RxNorm: 516621 Tablet(s) PO as needed 2020                Inactive                   

 

                acyclovir 400 mg tablet RxNorm: 648440  1 Tablet(s) PO PRN fever

 blisters 

2020          Inactive             

 

             clonidine HCl 0.1 mg tablet RxNorm: 627951 Tablet(s) PO as needed 0

8/15/2019   

2019                Inactive                   







Medication Administered





             Medication   Codes        Instructions Start Date   Status

 

                Kenalog 40 mg/mL suspension for injection RxNorm: 4481577 1Milli

liter     10/25/2021

                                        No longer Active

 

                Depo-Estradiol 5 mg/mL intramuscular oil RxNorm: 460317  0.75Mil

liliter  

2021                              No longer Active

 

                Depo-Estradiol 5 mg/mL intramuscular oil RxNorm: 329356  0.75Mil

liliter  

2021                              No longer Active

 

                Depo-Provera 150 mg/mL intramuscular suspension RxNorm: 1194687 

Milliliter      

2021                              No longer Active

 

                Depo-Provera 150 mg/mL intramuscular suspension RxNorm: 1701671 

Milliliter      

2021                              No longer Active

 

                Depo-Provera 150 mg/mL intramuscular suspension RxNorm: 1800552 

Milliliter      

10/20/2020                              No longer Active

 

                Depo-Provera 150 mg/mL intramuscular suspension RxNorm: 7339293 

0.75Milliliter  

2020                              No longer Active

 

                Kenalog 40 mg/mL suspension for injection RxNorm: 2464534 1Milli

liter     2020

                                        No longer Active

 

                Depo-Provera 150 mg/mL intramuscular suspension RxNorm: 6464810 

Milliliter      

2020                              No longer Active

 

                Depo-Provera 150 mg/mL intramuscular suspension RxNorm: 3753431 

Milliliter      

2020                              No longer Active

 

                Depo-Estradiol 5 mg/mL intramuscular oil RxNorm: 123413  3/4Mill

ilitermonthly 

2020                              Active

 

             Kenalog 40 mg/mL suspension for injection RxNorm: 3687594 Millilite

r   2019   

No longer Active

 

                Depo-Estradiol 5 mg/mL intramuscular oil RxNorm: 085023  3/4Mill

ilitermonthly 

2019                              Active







Immunizations

No Immunization data



Results





          Observation Observation Code Item      Item Code Result    Date      S

vice Location

 

          Cbc With Differential Ord2      WBC                 11.77 K/ul 

021 Unknown

 

          Cbc With Differential Ord2      RBC                 4.50 M/ul 20

21 Unknown

 

          Cbc With Differential Ord2      HGB                 13.6 g/dl 20

21 Unknown

 

          Cbc With Differential Ord2      Neut%               69.1 %    20

21 Unknown

 

          Cbc With Differential Ord2      HCT                 43.0 %    20

21 Unknown

 

          Cbc With Differential Ord2      MCV                 95.6 fl   20

21 Unknown

 

          Cbc With Differential Ord2      Lymph%              22.0 %    20

21 Unknown

 

          Cbc With Differential Ord2      MCH                 30.2 pg   20

21 Unknown

 

          Cbc With Differential Ord2      Mono%               6.0 %     20

21 Unknown

 

          Cbc With Differential Ord2      Eos%                2.6 %     20

21 Unknown

 

          Cbc With Differential Ord2      MCHC                31.6 pg   20

21 Unknown

 

          Cbc With Differential Ord2      PLT                 288 K/ul  20

21 Unknown

 

          Cbc With Differential Ord2      Baso%               0.3 %     20

21 Unknown

 

          Cbc With Differential Ord2      RDW                 13.5 %    20

21 Unknown

 

          Cbc With Differential Ord2      Neut ABS#           8.13 K/ul 20

21 Unknown

 

          Cbc With Differential Ord2      Lymph ABS#           2.59 K/ul 

021 Unknown

 

          Cbc With Differential Ord2      Mono ABS#           0.7 K/ul  20

21 Unknown

 

          Cbc With Differential Ord2      Eos ABS#            0.3 K/ul  20

21 Unknown

 

          Cbc With Differential Ord2      Baso ABS#           0.0 K/ul  20

21 Unknown

 

          Comp Metabolic Whz534    NA                  142 mEq/L 2021 Unkn

own

 

          Comp Metabolic Wpl259    K                   4.4 mEq/L 2021 Unkn

own

 

          Comp Metabolic Yea499    CL                  101 mEq/L 2021 Unkn

own

 

          Comp Metabolic Kbz216    CO2                 32.0 mEq/L 2021 Unk

nown

 

          Comp Metabolic Seu889    ANION GAP           13        2021 Un[facility]

own

 

          Comp Metabolic Dka353    GLUCOSE             89 mg/dL  2021 Unkn

own

 

          Comp Metabolic Wpa713    Creat               0.7 mg/dL 2021 Unkn

own

 

          Comp Metabolic Acu500    eGFR                90 ml/min/1.73m2 20

21 Unknown

 

          Comp Metabolic Jrc313    BUN                 19 mg/dL  2021 Unkn

own

 

          Comp Metabolic Sgu523    B/C Ratio           26.4 Ratio 2021 Unk

nown

 

          Comp Metabolic Tkz524    CALCIUM             9.2 mg/dL 2021 Unkn

own

 

          Comp Metabolic Iip124    ALK PHOS            85 U/L    2021 Unkn

own

 

          Comp Metabolic Rrr601    AST(SGOT)           16 U/L    2021 Unkn

own

 

          Comp Metabolic Xik297    ALT(SGPT)           19 U/L    2021 Unkn

own

 

          Comp Metabolic Yqe319    BILI T              0.6 mg/dL 2021 Unkn

own

 

          Comp Metabolic Zqd664    ALBUMIN             4.2 g/dL  2021 Unkn

own

 

          Comp Metabolic Wjz713    TPRO                7.1 g/dL  2021 Unkn

own

 

          Comp Metabolic Hlv560    GLOB                2.9 g/dL  2021 Unkn

own

 

          Comp Metabolic Jjb494    A/G Ratio           1.5 Ratio 2021 Unkn

own

 

          Comp Metabolic Dna714    Osmo                285 mOsmo 2021 Unkn

own

 

          Tsh       Ord6      TSH (3rd IS)           1.14 uIU/mL 2021 Unkn

own

 

          Lipid     Ord30     CHOL                233 mg/dL 2021 Unknown

 

          Lipid     Ord30     HDL                 54.0 mg/dl 2021 Unknown

 

          Lipid     Ord30     TRIG                101 mg/dL 2021 Unknown

 

          Lipid     Ord30     LDL                 159 mg/dL 2021 Unknown

 

          Lipid     Ord30     C/HDL               4.3 Ratio 2021 Unknown

 

          Lipid     Ord30     CHOL                249 mg/dL 10/22/2020 Unknown

 

          Lipid     Ord30     HDL                 55.0 mg/dl 10/22/2020 Unknown

 

          Lipid     Ord30     TRIG                82 mg/dL  10/22/2020 Unknown

 

          Lipid     Ord30     LDL                 178 mg/dL 10/22/2020 Unknown

 

          Lipid     Ord30     C/HDL               4.5 Ratio 10/22/2020 Unknown

 

          Comp Metabolic Sbp340    NA                  138 mEq/L 10/22/2020 Unkn

own

 

          Comp Metabolic Nha889    K                   4.5 mEq/L 10/22/2020 Unkn

own

 

          Comp Metabolic Hqh019    CL                  103 mEq/L 10/22/2020 Unkn

own

 

          Comp Metabolic Koe155    CO2                 28.0 mEq/L 10/22/2020 Unk

nown

 

          Comp Metabolic Dso535    ANION GAP           12        10/22/2020 Unkn

own

 

          Comp Metabolic Zhz176    GLUCOSE             90 mg/dL  10/22/2020 Unkn

own

 

          Comp Metabolic Blx604    Creat               0.7 mg/dL 10/22/2020 Unkn

own

 

          Comp Metabolic Gvb908    eGFR                88 ml/min/1.73m2 10/22/20

20 Unknown

 

          Comp Metabolic Aio287    BUN                 20 mg/dL  10/22/2020 Unkn

own

 

          Comp Metabolic Wzk468    B/C Ratio           27.0 Ratio 10/22/2020 Unk

nown

 

          Comp Metabolic Mgn069    CALCIUM             9.5 mg/dL 10/22/2020 Unkn

own

 

          Comp Metabolic Swn739    ALK PHOS            87 U/L    10/22/2020 Unkn

own

 

          Comp Metabolic Ljp063    AST(SGOT)           17 U/L    10/22/2020 Unkn

own

 

          Comp Metabolic Ykf583    ALT(SGPT)           20 U/L    10/22/2020 Unkn

own

 

          Comp Metabolic Jgz515    BILI T              0.6 mg/dL 10/22/2020 Unkn

own

 

          Comp Metabolic Dyr221    ALBUMIN             4.5 g/dL  10/22/2020 Unkn

own

 

          Comp Metabolic Veq192    TPRO                7.6 g/dL  10/22/2020 Unkn

own

 

          Comp Metabolic Ybl499    GLOB                3.1 g/dL  10/22/2020 [facility]

[facility]

 

          Comp Metabolic Eqy105    A/G Ratio           1.5 Ratio 10/22/2020 [facility]

[facility]

 

          Comp Metabolic Kvm720    Osmo                278 mOsmo 10/22/2020 [facility]

own

 

          Cbc With Differential Ord2      WBC                 10.56 K/ul 10/22/2

020 Unknown

 

          Cbc With Differential Ord2      RBC                 4.44 M/ul 10/22/20

20 Unknown

 

          Cbc With Differential Ord2      HGB                 14.0 g/dl 10/22/20

20 Unknown

 

          Cbc With Differential Ord2      HCT                 42.8 %    10/22/20

20 Unknown

 

          Cbc With Differential Ord2      Neut%               73.9 %    10/22/20

20 Unknown

 

          Cbc With Differential Ord2      MCV                 96.4 fl   10/22/20

20 Unknown

 

          Cbc With Differential Ord2      Lymph%              18.2 %    10/22/20

20 Unknown

 

          Cbc With Differential Ord2      MCH                 31.5 pg   10/22/20

20 Unknown

 

          Cbc With Differential Ord2      Mono%               5.4 %     10/22/20

20 Unknown

 

          Cbc With Differential Ord2      MCHC                32.7 pg   10/22/20

20 Unknown

 

          Cbc With Differential Ord2      Eos%                2.2 %     10/22/20

20 Unknown

 

          Cbc With Differential Ord2      PLT                 258 K/ul  10/22/20

20 Unknown

 

          Cbc With Differential Ord2      Baso%               0.3 %     10/22/20

20 Unknown

 

          Cbc With Differential Ord2      RDW                 13.4 %    10/22/20

20 Unknown

 

          Cbc With Differential Ord2      Neut ABS#           7.81 K/ul 10/22/20

20 Unknown

 

          Cbc With Differential Ord2      Lymph ABS#           1.92 K/ul 10/22/2

020 Unknown

 

          Cbc With Differential Ord2      Mono ABS#           0.6 K/ul  10/22/20

20 Unknown

 

          Cbc With Differential Ord2      Eos ABS#            0.2 K/ul  10/22/20

20 Unknown

 

          Cbc With Differential Ord2      Baso ABS#           0.0 K/ul  10/22/20

20 Unknown

 

          Tsh       Ord6      TSH (3rd IS)           1.38 uIU/mL 2020 [facility]

[facility]

 

          Comp Metabolic Htf826    NA                  141 mEq/L 2020 [facility]

[facility]

 

          Comp Metabolic Mqm658    K                   4.3 mEq/L 2020 [facility]

[facility]

 

          Comp Metabolic Lzm603    CL                  104 mEq/L 2020 [facility]

[facility]

 

          Comp Metabolic Cok856    CO2                 29.0 mEq/L 2020 [facility]

[facility]own

 

          Comp Metabolic Rxc213    ANION GAP           12        2020 [facility]

[facility]

 

          Comp Metabolic Etg570    GLUCOSE             83 mg/dL  2020 [facility]

[facility]

 

          Comp Metabolic Jpy169    Creat               0.7 mg/dL 2020 [facility]

own

 

          Comp Metabolic Ate799    eGFR                102 ml/min/1.73m2 

020 Unknown

 

          Comp Metabolic Upu708    BUN                 16 mg/dL  2020 [facility]

[facility]

 

          Comp Metabolic Ndx543    B/C Ratio           24.6 Ratio 2020 [facility]

[facility]

 

          Comp Metabolic Rli782    CALCIUM             9.3 mg/dL 2020 [facility]

[facility]

 

          Comp Metabolic Djd300    ALK PHOS            79 U/L    2020 [facility]

[facility]

 

          Comp Metabolic Bib626    AST(SGOT)           19 U/L    2020 [facility]

[facility]

 

          Comp Metabolic Hyj685    ALT(SGPT)           26 U/L    2020 [facility]

[facility]

 

          Comp Metabolic Uom434    BILI T              0.5 mg/dL 2020 [facility]

[facility]

 

          Comp Metabolic Elb529    ALBUMIN             4.2 g/dL  2020 [facility]

[facility]

 

          Comp Metabolic Sjc001    TPRO                7.0 g/dL  2020 [facility]

[facility]

 

          Comp Metabolic Rsw395    GLOB                2.8 g/dL  2020 [facility]

[facility]

 

          Comp Metabolic Wvh360    A/G Ratio           1.5 Ratio 2020 [facility]

[facility]

 

          Comp Metabolic Zrw741    Osmo                282 mOsmo 2020 [facility]

[facility]

 

          Cbc With Differential Ord2      WBC                 8.94 K/ul 20

20 Unknown

 

          Cbc With Differential Ord2      RBC                 4.34 M/ul 20

20 Unknown

 

          Cbc With Differential Ord2      HGB                 13.3 g/dl 20

20 Unknown

 

          Cbc With Differential Ord2      HCT                 40.7 %    20

20 Unknown

 

          Cbc With Differential Ord2      Neut%               71.8 %    20

20 Unknown

 

          Cbc With Differential Ord2      MCV                 93.8 fl   20

20 Unknown

 

          Cbc With Differential Ord2      Lymph%              19.5 %    20

20 Unknown

 

          Cbc With Differential Ord2      MCH                 30.6 pg   20

20 Unknown

 

          Cbc With Differential Ord2      Mono%               6.2 %     20

20 Unknown

 

          Cbc With Differential Ord2      Eos%                2.2 %     20

20 Unknown

 

          Cbc With Differential Ord2      MCHC                32.7 pg   20

20 Unknown

 

          Cbc With Differential Ord2      Baso%               0.3 %     20

20 Unknown

 

          Cbc With Differential Ord2      PLT                 262 K/ul  20

20 Unknown

 

          Cbc With Differential Ord2      RDW                 13.4 %    20

20 Unknown

 

          Cbc With Differential Ord2      Neut ABS#           6.42 K/ul 20

20 Unknown

 

          Cbc With Differential Ord2      Lymph ABS#           1.74 K/ul 

020 Unknown

 

          Cbc With Differential Ord2      Mono ABS#           0.6 K/ul  20

20 Unknown

 

          Cbc With Differential Ord2      Eos ABS#            0.2 K/ul  20

20 Unknown

 

          Cbc With Differential Ord2      Baso ABS#           0.0 K/ul  20

20 Unknown

 

          Lipid     Ord30     CHOL                240 mg/dL 2020 Unknown

 

          Lipid     Ord30     HDL                 57.0 mg/dl 2020 Unknown

 

          Lipid     Ord30     TRIG                80 mg/dL  2020 Unknown

 

          Lipid     Ord30     LDL                 167 mg/dL 2020 Unknown

 

          Lipid     Ord30     C/HDL               4.2 Ratio 2020 Unknown

 

          C A/B FLU [id number]   Influenza A Scr           Negative  2019 [facility]

[facility]

 

          C A/B FLU [id number]   Influenza B Scr           Negative  2019 [facility]

[facility]

 

           C A/B FLU  [id number]    Influenza Intrp B AG:PRID:PT:NOSE:NOM:IF       

     See Footnote  

2019                              Unknown







Procedures





                    Procedure           Codes               Date

 

                    THER/PROPH/DIAG INJ SC/IM CPT-4: 94526        2021

 

                    THER/PROPH/DIAG INJ SC/IM CPT-4: 09516        2021

 

                    THER/PROPH/DIAG INJ SC/IM CPT-4: 02067        2021

 

                    THER/PROPH/DIAG INJ SC/IM CPT-4: 79378        2021

 

                    THER/PROPH/DIAG INJ SC/IM CPT-4: 90788        10/20/2020

 

                    THER/PROPH/DIAG INJ SC/IM CPT-4: 19244        2020

 

                    TRIAMCINOLONE ACET INJ NOS 10 mg CPT-4:         

020

 

                    THER/PROPH/DIAG INJ SC/IM CPT-4: 93677        2020

 

                    THER/PROPH/DIAG INJ SC/IM CPT-4: 58420        2020

 

                    THER/PROPH/DIAG INJ SC/IM CPT-4: 08103        2020

 

                    TRIAMCINOLONE ACET INJ NOS 10 mg CPT-4:         

019

 

                    THER/PROPH/DIAG INJ SC/IM CPT-4: 70680        2019







Vital Signs





                          Date                      Vital

 

                2021      Blood Pressure 1: 156/82 Code: 8480-6 Heart Rate

 1: 81 bpm Height: 

5'2" Code: 8302-2   Height: 5'2" Code: 8302-2 SpO2: 99%           Temperature: 3

6.2 (C) / 

97.1 (F)                                Weight:  Code: 92494-6

 

                10/25/2021      Blood Pressure 1: 142/68 Code: 8480-6 BMI: 30.7 

Code: 11483-8 Heart 

Rate 1: 63 bpm      Height: 5'2" Code: 8302-2 SpO2: 97%           Temperature: 3

6.4 (C) / 97.6 

(F)                                     Weight: 168 lbs  Code: 23424-6

 

                2020      Blood Pressure 1: 130/80 Code: 8480-6 BMI: 28.5 

Code: 49634-1 Heart 

Rate 1: 69 bpm      Height: 5'2" Code: 8302-2 SpO2: 97%           Temperature: 3

6.9 (C) / 98.4 

(F)                                     Weight: 156 lbs  Code: 28856-3

 

                2020      Blood Pressure 1: 128/78 Code: 8480-6 Heart Rate

 1: 74 bpm Height:  

Code: 8302-2              SpO2: 98%                 Weight:  Code: 05548-3

 

                    2020          Height:  Code: 8302-2 Weight:  Code: 294

63-7

 

                2020      Blood Pressure 1: 132/80 Code: 8480-6 BMI: 28.9 

Code: 55520-5 Heart 

Rate 1: 68 bpm      Height: 5'2" Code: 8302-2 SpO2: 97%           Weight: 158 lb

s  Code: 

82538-7

 

                2019      Blood Pressure 1: 110/60 Code: 8480-6 BMI: 28.5 

Code: 03302-3 Heart 

Rate 1: 76 bpm      Height: 5'2" Code: 8302-2 SpO2: 98%           Temperature: 3

6.9 (C) / 98.5 

(F)                                     Weight: 156 lbs  Code: 29164-9

 

             2019   Blood Pressure 1: 124/80 Code: 8480-6 Heart Rate 1: 81

 bpm SpO2: 98%    

Temperature: 36.7 (C) / 98.1 (F)

 

                2019      Blood Pressure 1: 130/78 Code: 8480-6 BMI: 28.0 

Code: 81873-1 Heart 

Rate 1: 68 bpm      Height: 5'2" Code: 8302-2 SpO2: 98%           Weight: 153 lb

s  Code: 

92106-6

 

                2019      Heart Rate 1: 68 bpm Height:  Code: 8302-2 Weigh

t:  Code: 28302-7

 

                2019      Blood Pressure 1: 132/74 Code: 8480-6 BMI: 27.4 

Code: 31967-2 Heart 

Rate 1: 66 bpm      Height: 5'2" Code: 8302-2 SpO2: 98%           Weight: 150 lb

s  Code: 

60007-8

 

                08/15/2019      Blood Pressure 1: 104/60 Code: 8480-6 BMI: 27.5 

Code: 14845-0 Heart 

Rate 1: 66 bpm      Height: 5'2" Code: 8302-2 SpO2: 98%           Weight: 150 lb

s 5 oz Code: 

36510-7







Functional Status

No Functional Status data



Reason For Visit





                    Reason For Visit    Effective Dates     Notes

 

                    cough               2021           

 

                    sinus congestion    10/25/2021           

 

                    headache            2020           

 

                    headache            2020           

 

                    well woman exam (40-65 years) 2020           

 

                    cough               2019           

 

                    fever               2019           

 

                    menopausal symptoms 2019           

 

                    lower leg pain      2019           

 

                    lower leg pain      2019           

 

                    anxiety             08/15/2019           







Encounters





             Encounter    Performer    Location     Codes        Date

 

                                         EST. PATIENT, LEVEL IV

Diagnosis: Acute bronchitis[ICD10: J20.9]

Diagnosis: Laryngitis[ICD10: J04.0]

Diagnosis: Other allergic rhinitis[ICD10: J30.89]

Diagnosis: Slow transit constipation[ICD10: K59.01] [facility]                   CPT-4: 36360              2021

 

                                        [id number] EST. PATIENT, LEVEL IV

Diagnosis: Generalized anxiety disorder[ICD10: F41.9]

Diagnosis: Cough[ICD10: R05.9]

Diagnosis: Other allergic rhinitis[ICD10: J30.89]

Diagnosis: Encounter for screening mammogram for malignant neoplasm of 
breast[ICD10: Z12.31] Eulalia Crump MD, [facility] CPT-4: 31477    

10/25/2021

 

                                        [id number] 63512 EST. PATIENT, LEVEL III

Diagnosis: Headache[ICD10: R51]

Diagnosis: Other allergic rhinitis[ICD10: J30.89] Bree Gutierres MD, [facility]          CPT-4: 17134              2020

 

                                        05192 EST. PATIENT, LEVEL III

Diagnosis: Other acute sinusitis[ICD10: J01.80]

Diagnosis: Other allergic rhinitis[ICD10: J30.89]

Diagnosis: Migraine without status migrainosus, not intractable, unspecified 
migraine type[ICD10: G43.909]

Diagnosis: Menopausal and female climacteric states[ICD10: N95.1] Flakita Crump MD, [facility]    CPT-4: 38334              2020

 

                                        [phone] [id number] EST. PATIENT, LEVEL III

Diagnosis: Generalized anxiety disorder[ICD10: F41.1] Yesica Crump MD, [facility]          CPT-4: 29456              2020

 

                                        ([id number]) PREV VISIT EST AGE 40-64

Diagnosis: Encounter for gynecological examination (general) (routine) without 
abnormal findings[ICD10: Z01.419] Bree Crump MD, [facility] CPT

-4:

29471                                   2020

 

                                        [phone] [id number] EST. PATIENT, LEVEL III

Diagnosis: Cough[ICD10: R05]

Diagnosis: Acute bronchitis[ICD10: J20.9] Bree carrasco MD, 

[facility]                       CPT-4: 46523              2019

 

                                        [phone] 75475 EST. PATIENT, LEVEL III

Diagnosis: Fever[ICD10: R50.9]

Diagnosis: Body aches[ICD10: R52] Bree Crump MD, [facility] CPT

-4:

89128                                   2019

 

                                        [phone] [id number] EST. PATIENT, LEVEL III

Diagnosis: Menopausal and female climacteric states[ICD10: N95.1] Bree Crump MD, [facility] CPT-4: 36522        2019

 

                                        62298 EST. PATIENT, LEVEL III

Diagnosis: Pain in left ankle and joints of left foot[ICD10: M25.572]

Diagnosis: Pain in left knee[ICD10: M25.562] Flakita jackson MD, [facility]

                          CPT-4: 71736              2019

 

                                        64551 EST. PATIENT, LEVEL III

Diagnosis: Pain in left ankle and joints of left foot[ICD10: M25.572]

Diagnosis: Pain in left knee[ICD10: M25.562] [facility]

                          CPT-4: 04012              2019

 

                                        ([id number]) PREV VISIT NEW AGE 40-64

Diagnosis: Encounter for general adult medical examination without abnormal 
findings[ICD10: Z00.00] [facility] CPT-4: 22408    

08/15/2019







Plan of Care





             Planned Activity Notes        Codes        Status       Date

 

                          Visit Plan:               Allergies - recent increase 

in nasal congestion that has been 

worsening cough. Has tried Claritin in the past without any relief. Discussed 
use of being aggressive with allergies to help decrease cough and loss of voice.
Discussed use of Allegra twice a day x 5 days then decrease down to daily. BP 
elevated the last 2 visits, so discouraged use of Sudafed. Discussed us of 
Cloricidin HP as needed for congestions. Laryngitis - secondary to acute 
bronchitis that has worsened over the last month. Cough has improved, but pt 
reports tightness when lying down. Discussed possible silent GERD due to increas
e in coughing. Recommended taking Omeprazole daily x 2 weeks. Acute Bronchitis -
cough improved, denies chest congestion. Completed 2 rounds of antibiotics and 2
rounds of steroids. Discussed treat more aggressive with allergies and GERD. Pt 
encouraged to follow up in 2 weeks if no improvement. Slow transit constipation 
- encouraged use Miralax 1 capful daily, may titrate based on needed. Allergies 
exacerbation with cough - chronic - recommended pt to use allergy medication as 
prescribed. Pt has been counseled as to the appropriate use of the medication. 
Pt to call if allergy symptoms are not controlled with the medication. If using 
nasal spray, instructions as follows: Nasal spray- use twice daily, one spray 
per nostril twice daily, after 30 minutes, rinse out nose with saline spray.. 
Use opposite hand per nostril to spray in the nasal steroid allergy spray. 
Kenalog injection given at visit. Prednisone rx sent. Phenergan with codeine 
cough medicine sent. Screening mammogram - orders sent. Anxiety - controlled. 
Continue prn alprazolam at bedtime and escitalopram. Fasting lab orders sent

                                                            2021

 

             Patient Education: Patient Medication Summary                      

     Completed    2021

 

             Patient Education: Patient Medication Summary                      

     Completed    2021

 

                          Visit Plan:               Allergies exacerbation with 

cough - chronic - recommended pt to use

allergy medication as prescribed. Pt has been counseled as to the appropriate 
use of the medication. Pt to call if allergy symptoms are not controlled with 
the medication. If using nasal spray, instructions as follows: Nasal spray- use 
twice daily, one spray per nostril twice daily, after 30 minutes, rinse out nose
with saline spray.. Use opposite hand per nostril to spray in the nasal steroid 
allergy spray. Kenalog injection given at visit. Prednisone rx sent. Phenergan 
with codeine cough medicine sent. Screening mammogram - orders sent. Anxiety - 
controlled. Continue prn alprazolam at bedtime and escitalopram. Fasting lab 
orders sent

                                                            10/25/2021

 

                                        Appointment: Eulalia Osman 

WPtel:[phone]

                                        [facility] [facility]

                                              (30 min) Complex 10/25/2021

 

             Patient Education: Patient Medication Summary                      

     Completed    10/25/2021

 

             Patient Education: prednisone- [employer] C[employer] [id number]         

                  Completed    

10/25/2021

 

             Appointment:                            Injection    2021

 

             Patient Education: Patient Medication Summary                      

     Completed    2021

 

             Appointment:                            Injection    2021

 

             Patient Education: Patient Medication Summary                      

     Completed    2021

 

             Appointment:                            Injection    2021

 

             Appointment:                            Injection    2021

 

             Patient Education: Patient Medication Summary                      

     Completed    2021

 

             Appointment:                            Injection    2021

 

             Patient Education: Patient Medication Summary                      

     Completed    2021

 

             Patient Education: Patient Medication Summary                      

     Completed    11/10/2020

 

             Patient Education: Patient Medication Summary                      

     Completed    10/22/2020

 

             Appointment:                            Injection    10/20/2020

 

             Patient Education: Patient Medication Summary                      

     Completed    10/20/2020

 

                          Visit Plan:               Allergies - chronic - recomm

ended pt to use allergy medication as 

prescribed. Pt has been counseled as to the appropriate use of the medication. 
Pt to call if allergy symptoms are not controlled with the medication. If using 
nasal spray, instructions as follows: Nasal spray- use twice daily, one spray 
per nostril twice daily, after 30 minutes, rinse out nose with saline spray.. 
Use opposite hand per nostril to spray in the nasal steroid allergy spray. 
Headache -will treat allergies/congestion - instructed patient to call if does 
not improve and we will order CT Head without contrast.

                                                            2020

 

                                        Appointment: Bree Oleary 

WPtel:[phone] [address] [facility]

                                              (15 min) Moderate 2020

 

             Patient Education: Patient Medication Summary                      

     Completed    2020

 

                          Visit Plan:               Sinusitis - Pt has acute inf

ection - pain in face, maxillary 

region, Pt informed to use decongestant, RX given to patient, sinus rinses also 
recommended. Call if symptoms do not show improvement. Allergies - chronic - 
recommended pt to use allergy medication as prescribed. Pt has been counseled as
to the appropriate use of the medication. Pt to call if allergy symptoms are not
controlled with the medication. If using nasal spray, instructions as follows: 
Nasal spray- use twice daily, one spray per nostril twice daily, after 30 
minutes, rinse out nose with saline spray.. Use opposite hand per nostril to 
spray in the nasal steroid allergy spray. Migraine - will treat for sinus 
infection - pt is to notify clinic if symptoms do not improve, if they worsen, 
or with any changes, questions, or concerns.

                                                            2020

 

                                        Appointment: Flakita Castro 

WPtel:[phone]

                                        [facility]

                                              (30 min) Complex 2020

 

             Patient Education: Patient Medication Summary                      

     Completed    2020

 

                          Visit Plan:               Anxiety - the patient has un

controlled anxiety and will benefit 

from a short term dose of xanax for control of symptoms.

                                                            2020

 

                                        Appointment: Yesica Crump 

WPtel:[phone]

                                        [facility] [facility]

                                              TeleHealth      2020

 

             Patient Education: Patient Medication Summary                      

     Completed    2020

 

             Appointment:                            Injection    2020

 

             Patient Education: Patient Medication Summary                      

     Completed    2020

 

             Appointment:                            Injection    2020

 

             Appointment:                            Injection    2020

 

             Patient Education: Patient Medication Summary                      

     Completed    2020

 

                                        Appointment: Yesica Crump 

WPtel:[phone] [address] [facility]

                                              Well Woman      03/10/2020

 

                          Visit Plan:               Well Adult Female - exam com

pleted. Pap and breast exam completed. 

Pt will be called with results of her testing. She was advised to continue with 
yearly annual exams. Safe sex practices discussed during office visit today. 
Call if any abnormal gynecologic issues during the next year, otherwise, RTC 
yearly or prn.

                                                            2020

 

                                        Appointment: Bree Oleary 

WPtel:[phone]

                                        [facility] [facility]

                                              Well Woman      2020

 

             Patient Education: Patient Medication Summary                      

     Completed    2020

 

                                        Appointment: Yesica Crump 

WPtel:[phone]

                                        [address] [place], [place] [id number]

                                              (15 min) Moderate 2020

 

             Appointment:                            Injection    2020

 

             Patient Education: Patient Medication Summary                      

     Completed    2020

 

                          Visit Plan:               Bronchitis - acute case of b

ronchitis identified. Pt has been given

antibiotics, breathing treatments as appropriate, and pt has been instructed to 
call if symptoms are not improved, or if symptoms acutely worsen.

                                                            2019

 

                                        Appointment: Bree Oleary 

WPtel:[phone]

                                        [address] [place], [place] [id number]

                                              (15 min) Moderate 2019

 

             Patient Education: Patient Medication Summary                      

     Completed    2019

 

                          Visit Plan:               URI -viral -flu swab to r/o 

flu - Pt advised to increase fluids, 

vitamin C. Discussed natural and expected course of this diagnosis and need to 
alert me if symptoms do not follow expected course, or if any worse.

                                                            2019

 

                                        Appointment: Bree Oleary 

WPtel:[phone]

                                        [facility]

                                              (30 min) Complex 2019

 

             Patient Education: Patient Medication Summary                      

     Completed    2019

 

                          Visit Plan:               Post-surgical menopause - co

ntinue to taper depo dose and 

eventually off medication completely -injection today in the office.

                                                            2019

 

                                        Appointment: Bree Oleary 

WPtel:[phone]

                                        [facility] [facility]

                                              (15 min) Moderate 2019

 

             Patient Education: Patient Medication Summary                      

     Completed    2019

 

                                        Appointment: Bree Oleary 

WPtel:[phone]

                                        [address] [place], [place] [id number]

[place]                                              (30 min) Complex 2019

 

                          Visit Plan:               Left knee, ankle, foot pain 

- will send RX - will refer to ortho - 

The pt is to use prn antiinflammatories to manage acute pain. The patient is to 
call the office if the pain is worsening or does not improve.

                                                            2019

 

                                        Appointment: Flakita Castro 

WPtel:[phone]

                                        [address] [facility]

[place]                                              (30 min) Complex 2019

 

             Patient Education: Patient Medication Summary                      

     Completed    2019

 

                    Care Plan: Referral Order                     SNOMED-CT : 30

[id number]

                          Pending                   2019

 

                          Visit Plan:               Left knee, ankle, foot pain 

- will send RX - if no improvement will

refer to ortho - The pt is to use prn antiinflammatories to manage acute pain. 
The patient is to call the office if the pain is worsening or does not improve.

                                                            2019

 

                                        Appointment: Flakita Castro 

WPtel:[phone]

                                        [address] [facility]

[place]                                              (30 min) Complex 2019

 

             Patient Education: Patient Medication Summary                      

     Completed    2019

 

                          Visit Plan:               Well Adult - pt was counsele

d about diet, exercise, and encouraged 

to follow a heart healthy diet and increase activity level. The patient was 
instructed to RTC yearly for well adult exams and PRN for acute illnesses. The 
pt was also instructed to have yearly labs for check of cholesterol, thyroid, 
chem panel, CBC, and renal functioning. Post-surgical menopause - I have advised
the pt that we will look for her medications from [facility] to find out what her depo 
dose is so I can wean her off of it. Anxiety - the patient has uncontrolled 
anxiety and will benefit from an SSRI on a daily basis to attempt control of the
symptoms of anxiety (tachycardia, overwhelming sensations, stress, insomnia, 
etc). stop clonidine due to hypotension

                                                            08/15/2019

 

             Patient Education: Patient Medication Summary                      

     Completed    08/15/2019

 

             Referral: Álvaro Sellers  Referral                  Appointment Co

nfirmed  

 

             Referral: Álvaro Sellers  Referral                  Completed     







Instructions





                                        Comment

 

                                        GENERIC ALLEGRA OTC 1 TABLET TWICE A DAY

 FOR 5 DAYS, THEN DECREASE TO ONCE A DAY



OMEPRAZOLE (GENERIC PRILOSEC) 20MG TAKE 1 DAILY X 14



CLORICIDIN HP OVER THE COUNTER DECONGESTANT AS NEEDED WHEN YOU FEEL MORE 
CONGESTED



MIRALAX 17GM 1 CAPFUL MIX WITH WATER DAILY

DUE FOR ANNUAL MAMMOGRAM- order faxed



DUE TO ANNUAL FASTING LABS- orders given at visit



CONTINUE XYZOL OR ZYRTEC



STEROID SHOT TODAY



PREDNISONE TABLETS TAKE 2 TABLETS DAILY X 5 DAYS



PHENERGAN W/CODEINE COUGH MEDICINE

                                        . Allergies - recent increase in nasal c

ongestion that has been worsening cough.

Has tried Claritin in the past without any relief. Discussed use of being 
aggressive with allergies to help decrease cough and loss of voice. Discussed 
use of Allegra twice a day x 5 days then decrease down to daily. BP elevated the
last 2 visits, so discouraged use of Sudafed. Discussed us of Cloricidin HP as 
needed for congestions. 



Laryngitis - secondary to acute bronchitis that has worsened over the last 
month. Cough has improved, but pt reports tightness when lying down. Discussed 
possible silent GERD due to increase in coughing. Recommended taking Omeprazole 
daily x 2 weeks. 



Acute Bronchitis - cough improved, denies chest congestion. Completed 2 rounds 
of antibiotics and 2 rounds of steroids. Discussed treat more aggressive with 
allergies and GERD. Pt encouraged to follow up in 2 weeks if no improvement. 



Slow transit constipation - encouraged use Miralax 1 capful daily, may titrate 
based on needed. 

Allergies exacerbation with cough - chronic - recommended pt to use allergy 
medication as prescribed.  Pt has been counseled as  to the appropriate use of 
the medication.  Pt to call if allergy symptoms are not controlled with the 
medication.

If using nasal spray, instructions as follows: Nasal spray- use twice daily, one
spray per nostril twice daily, after 30 minutes, rinse out nose with saline 
spray.. Use opposite hand per nostril to spray in the nasal steroid allergy 
spray.  Kenalog injection given at visit. Prednisone rx sent. Phenergan with 
codeine cough medicine sent. 



Screening mammogram - orders sent.



Anxiety - controlled. Continue prn alprazolam at bedtime and escitalopram. 



Fasting lab orders sent









 

                                        DUE FOR ANNUAL MAMMOGRAM- order faxed



DUE TO ANNUAL FASTING LABS- orders given at visit



CONTINUE XYZOL OR ZYRTEC



STEROID SHOT TODAY



PREDNISONE TABLETS TAKE 2 TABLETS DAILY X 5 DAYS



PHENERGAN W/CODEINE COUGH MEDICINE

                                        . Allergies exacerbation with cough - 

[place] - recommended pt to use allergy 

medication as prescribed.  Pt has been counseled as  to the appropriate use of 
the medication.  Pt to call if allergy symptoms are not controlled with the 
medication.

If using nasal spray, instructions as follows: Nasal spray- use twice daily, one
spray per nostril twice daily, after 30 minutes, rinse out nose with saline 
spray.. Use opposite hand per nostril to spray in the nasal steroid allergy 
spray.  Kenalog injection given at visit. Prednisone rx sent. Phenergan with 
codeine cough medicine sent. 



Screening mammogram - orders sent.



Anxiety - controlled. Continue prn alprazolam at bedtime and escitalopram. 



Fasting lab orders sent







 

                                        . Allergies - chronic - recommended pt t

o use allergy medication as prescribed. 

Pt has been counseled as  to the appropriate use of the medication.  Pt to call 
if allergy symptoms are not controlled with the medication.

If using nasal spray, instructions as follows: Nasal spray- use twice daily, one
spray per nostril twice daily, after 30 minutes, rinse out nose with saline 
spray.. Use opposite hand per nostril to spray in the nasal steroid allergy 
spray.



Headache -will treat allergies/congestion - instructed patient to call if does 
not improve and we will order CT Head without contrast. 



 

                                        . Sinusitis - Pt has acute infection - p

ain in face, maxillary region, Pt 

informed to use decongestant, RX given to patient, sinus rinses also 
recommended.  Call if symptoms do not show improvement.



Allergies - chronic - recommended pt to use allergy medication as prescribed.  
Pt has been counseled as  to the appropriate use of the medication.  Pt to call 
if allergy symptoms are not controlled with the medication.

If using nasal spray, instructions as follows: Nasal spray- use twice daily, one
spray per nostril twice daily, after 30 minutes, rinse out nose with saline 
spray.. Use opposite hand per nostril to spray in the nasal steroid allergy 
spray.



Migraine - will treat for sinus infection - pt is to notify clinic if symptoms 
do not improve, if they worsen, or with any changes, questions, or concerns. 

 

                                        . Anxiety - the patient has uncontrolled

 anxiety and will benefit from a short 

term dose of xanax for control of symptoms.

 

                                        . Well Adult Female - exam completed.  P

ap and  breast exam completed.  Pt will 

be called with results of her testing.  She was advised to continue with yearly 
annual exams.   Safe sex practices discussed during office visit today.  Call if
any abnormal gynecologic issues during the next year, otherwise, RTC yearly or 
prn.



 

                                        kenalog 

                                        . Bronchitis - acute case of bronchitis 

identified.  Pt has been given 

antibiotics, breathing treatments as appropriate, and pt has been instructed to 
call if symptoms are not improved, or if symptoms acutely worsen.



 

                                        . URI -viral -flu swab to r/o flu - Pt a

dvised to increase fluids, vitamin C.  

Discussed natural and expected course of this diagnosis and need to alert me if 
symptoms do not follow expected course, or if any worse.



 

                                        . Post-surgical menopause - continue to 

taper depo dose and eventually off 

medication completely -injection today in the office. 



 

                                        tomorrow at 10:15 with the nurse praclouise benavides. Left knee, ankle, foot pain - 

will send RX - will refer to ortho - The pt is to use prn antiinflammatories to 
manage acute pain. The patient is to call the office if the pain is worsening or
does not improve. 





 

                                        . Left knee, ankle, foot pain - will sen

d RX - if no improvement will refer to 

ortho - The pt is to use prn antiinflammatories to manage acute pain. The 
patient is to call the office if the pain is worsening or does not improve. 



 

                                        . Well Adult - pt was counseled about di

et, exercise, and encouraged to follow a

heart healthy diet and increase activity level.  The patient was instructed to 
RTC yearly for well adult exams and PRN for acute illnesses.  The pt was also 
instructed to have yearly labs for check of cholesterol, thyroid, chem panel, 
CBC, and renal functioning.



Post-surgical menopause - I have advised the pt that we will look for her 
medications from [facility] to find out what her depo dose is so I can wean her off of 
it.



Anxiety - the patient has uncontrolled anxiety and will benefit from an SSRI on 
a daily basis to attempt control of the symptoms of anxiety (tachycardia, 
overwhelming sensations, stress, insomnia, etc). 

stop clonidine due to hypotension







Medical Equipment

No Medical Equipment data



Health Concerns Section

Health Concerns data not found



Goals Section

Goals data not found



Interventions Section

Interventions data not found



Health Status Evaluations/Outcomes Section

Health Status Evaluations/Outcomes data not found



Advance Directives

No Advance Directive data

## 2021-12-28 NOTE — XMS REPORT
CCD document using C-CDA

                             Created on: 2021



AyalaRosibel vegas

External Reference #: [id number]

: 1970

Sex: Female



Demographics





                          Address                   [address]

[place]  [address]

 

                          Home Phone                [phone]

 

                          Preferred Language        Unknown

 

                          Marital Status            Unknown

 

                          Confucianist Affiliation     Unknown

 

                          Race                      White

 

                          Ethnic Group              Not  or 





Author





                          Author                    Rosibel Crump

 

                          Organization              Yesica Crump MD, [facility]

 

                          Address                   [address] [place]  [address]



 

                          Phone                     [phone]







Care Team Providers





                    Care Team Member Name Role                Phone

 

                    Yesica Crump     PP                  Unavailable

 

                          CCM                       Unavailable







Summary Purpose

Interface Exchange



Insurance Providers





             Payer name   Policy type / Coverage type Covered party ID Effective

 Begin Date 

Effective End Date

 

             [facility] Commercial Insurance [id number] 

Unknown      

Unknown







Family history





Mother



                          Diagnosis                 Age At Onset

 

                          Diabetes mellitus Type 2  Unknown

 

                          Alcoholism                Unknown

 

                          Depression                Unknown







Father



                          Diagnosis                 Age At Onset

 

                          Diabetes mellitus Type 2  Unknown







Social History





                Social History Element Codes           Description     Effective

 Dates

 

                    Marital status      Unknown             

Ha                                   08/15/2019

 

                Number of children Unknown         2               08/15/2019

 

                    Employment          Unknown             Currently employed

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

 

                Tobacco history SNOMED CT: 81359663 Current some days smoker 08/

15/2019

 

                Alcohol history SNOMED CT: 465378517 Never drinks alcohol 08/15/

2019







Allergies, Adverse Reactions, Alerts





           Substance  Reaction   Codes      Entered Date Inactivated Date Status

 

           Penicillin            Unknown    08/15/2019 No Inactive Date Active







Problems





                Condition       Codes           Effective Dates Condition Status

 

                          Chronic cough             ICD-10: R05.3

ICD-9: 786.2              2021                Active

 

                          Dysphagia                 ICD-10: r13.10

ICD-9: 787.20             2021                Active

 

                          Laryngitis                ICD-10: J04.0

ICD-9: 464.00             2021                Active

 

                          Other allergic rhinitis   ICD-10: J30.89

ICD-9: 477.8              2020                Active

 

                          Acute bronchitis          ICD-10: J20.9

ICD-9: 466.0              2019                Active

 

                          Slow transit constipation ICD-10: K59.01

ICD-9: 564.01             2021                Active

 

                          Cough                     ICD-10: R05.9

ICD-9: 786.2              10/25/2021                Active

 

                          Encounter for screening mammogram for malignant neopla

sm of breast ICD-10: 

Z12.31

ICD-9: V76.12             11/10/2020                Active

 

                          Generalized anxiety disorder ICD-10: F41.9

ICD-9: 300.00             10/25/2021                Active

 

                          Menopausal and female climacteric states ICD-10: N95.1

ICD-9: 627.2              08/15/2019                Active

 

                          Encounter for general adult medical examination withou

t abnormal findings ICD-

10: Z00.00

ICD-9: V70.0              08/15/2019                Active

 

                          Screening, lipid          ICD-10: Z13.220

ICD-9: V77.91             10/22/2020                Active

 

                          Migraine without status migrainosus, not intractable, 

unspecified migraine type 

ICD-10: G43.909

ICD-9: 346.90             2020                Active

 

                          Headache                  ICD-10: R51

ICD-9: 784.0              2020                Active

 

                          Other acute sinusitis     ICD-10: J01.80

ICD-9: 461.8              2020                Active

 

                          Generalized anxiety disorder ICD-10: F41.1

ICD-9: 300.00             08/15/2019                Active

 

                                        Encounter for gynecological examination 

(general) (routine) without abnormal 

findings                                ICD-10: Z01.419

ICD-9: V72.31             2020                Active

 

                          Cough                     ICD-10: R05

ICD-9: 786.2              2019                Active

 

                          Body aches                ICD-10: R52

ICD-9: 780.96             2019                Active

 

                          Fever                     ICD-10: R50.9

ICD-9: 780.60             2019                Active

 

                          Pain in left ankle and joints of left foot ICD-10: M25

.572

ICD-9: 719.47             2019                Active

 

                          Pain in left knee         ICD-10: M25.562

ICD-9: 719.46             2019                Active







Medications





          Medication Codes     Instructions Start Date Stop Date Status    Fill 

Instructions

 

                    albuterol sulfate HFA 90 mcg/actuation aerosol inhaler RxNor

m: 0553347     Inhale 2 

Inhalation every 4-6 hours as needed cough 2021      No Stop Date    Activ

e           

 

                    atorvastatin 20 mg tablet RxNorm: 793448      Take 1 Tablet(

s) Oral every night at 

bedtime         2021      Active           

 

                Miralax 17 gram/dose oral powder RxNorm: 078552  Take 1 scoop Or

al every day 

2021          12/15/2021          Active               

 

                    Allegra Allergy 180 mg tablet RxNorm: 852808      Take 1 Tab

let(s) Oral every day for

first 5 days take twice daily 2021      No Stop Date    Active           

 

                    omeprazole 20 mg tablet,delayed release RxNorm: 600258      

Take 1 Tablet(s) Oral 

every day       2021      Inactive         

 

                prednisone 20 mg tablet RxNorm: 673096  Take 2 Tablet(s) Oral ev

estevan day 

2021          Inactive             

 

                    azithromycin 250 mg tablet RxNorm: 529500      Take 1 Tablet

(s) Oral every day take 2

tablets day 1, then take 1 tablet daily on days 2-5 2021

21          

Inactive                                Please disregard Cefdinir RX.

 

                cefdinir 300 mg capsule RxNorm: 633358  Take 1 Capsule(s) Oral t

wo times a day 

2021          Inactive             

 

                    azithromycin 250 mg tablet RxNorm: 883340      Take 1 Tablet

(s) Oral every day take 2

tablets day 1, then take 1 tablet daily on days 2-5 2021

21          

Inactive                                Please disregard Cefdinir RX.

 

                    promethazine 6.25 mg-codeine 10 mg/5 mL syrup RxNorm: 584897

      Take 5 

Milliliter(s) Oral every 4-6 hours as needed cough 10/25/2021          10/25/202

1          

Inactive                                 

 

                prednisone 20 mg tablet RxNorm: 438179  Take 2 Tablet(s) Oral ev

estevan day 

10/25/2021          10/29/2021          Inactive             

 

                    promethazine 6.25 mg-codeine 10 mg/5 mL syrup RxNorm: 870598

      Take 5 

Milliliter(s) Oral every 4-6 hours as needed cough 10/25/2021          11/03/202

1          

Inactive                                 

 

                    Kenalog 40 mg/mL suspension for injection RxNorm: 1040351   

  Take 1 Milliliter(s) 

Injection       10/25/2021      10/25/2021      Inactive         

 

                    alprazolam 0.5 mg tablet RxNorm: 126304      Take 1 Tablet(s

) Oral two times a day as

needed FOR ANXIETY 10/15/2021      2021      Inactive         

 

                    alprazolam 0.5 mg tablet RxNorm: 165311      1 Tablet(s) Ora

l two times a day as 

needed anxiety  2021      Inactive         

 

                    Depo-Estradiol 5 mg/mL intramuscular oil RxNorm: 576542     

 0.75 Milliliter(s) 

Intramuscular   2021      Inactive         

 

                    alprazolam 0.5 mg tablet RxNorm: 221574      1 Tablet(s) Ora

l two times a day as 

needed anxiety  2021      Inactive         

 

                    Depo-Estradiol 5 mg/mL intramuscular oil RxNorm: 534452     

 0.75 Milliliter(s) 

Intramuscular   2021      Inactive         

 

                    alprazolam 0.5 mg tablet RxNorm: 825715      1 Tablet(s) Ora

l two times a day as 

needed anxiety  2021      Inactive         

 

                    alprazolam 0.5 mg tablet RxNorm: 940017      1 Tablet(s) Ora

l two times a day as 

needed anxiety  04/15/2021      2021      Inactive         

 

                    Depo-Provera 150 mg/mL intramuscular suspension RxNorm: 1000

128     Milliliter(s) 

Intramuscular   2021      Inactive         

 

                    escitalopram 10 mg tablet RxNorm: 809422      TAKE ONE TABLE

T BY MOUTH EVERY NIGHT AT

BEDTIME Tablet(s) Oral 2021      Inactive         

 

                    alprazolam 0.5 mg tablet RxNorm: 703568      1 Tablet(s) Ora

l two times a day as 

needed anxiety  2021      Inactive         

 

                    escitalopram 5 mg tablet RxNorm: 404561      TAKE ONE TABLET

 BY MOUTH EVERY NIGHT AT 

BEDTIME         02/15/2021      2021      Inactive         

 

                    alprazolam 0.5 mg tablet RxNorm: 734515      1 Tablet(s) Ora

l two times a day as 

needed anxiety  02/15/2021      2021      Inactive         

 

                atorvastatin 20 mg tablet RxNorm: 613903  TAKE ONE TABLET BY SHELLY

TH DAILY 

2021          Inactive             

 

                    Depo-Estradiol 5 mg/mL intramuscular oil RxNorm: 354861     

 3/4 Milliliter(s) 

Intramuscular monthly 2021      Inactive         

 

                    Depo-Provera 150 mg/mL intramuscular suspension RxNorm: 1000

128     Milliliter(s) 

Intramuscular   2021      Inactive         

 

                    alprazolam 0.5 mg tablet RxNorm: 777021      1 Tablet(s) Ora

l two times a day as 

needed anxiety  2020      Inactive         

 

                    acyclovir 400 mg tablet RxNorm: 898127      TAKE ONE TABLET 

BY MOUTH FOUR TIMES A DAY

                2020      Inactive         

 

                    alprazolam 0.5 mg tablet RxNorm: 019298      1 Tablet(s) Ora

l two times a day as 

needed anxiety  2020      12/15/2020      Inactive         

 

             atorvastatin 20 mg tablet RxNorm: 212434 1 Tablet(s) Oral every day

 2020                Inactive                   

 

             atorvastatin 20 mg tablet RxNorm: 347488 1 Tablet(s) Oral every day

 2020   

02/10/2021                Inactive                   

 

                    alprazolam 0.5 mg tablet RxNorm: 774800      1 Tablet(s) Ora

l two times a day as 

needed anxiety  10/23/2020      11/15/2020      Inactive         

 

                    Depo-Provera 150 mg/mL intramuscular suspension RxNorm: 1000

128     Milliliter(s) 

Intramuscular   10/20/2020      10/20/2020      Inactive         

 

                    acyclovir 400 mg tablet RxNorm: 125584      TAKE ONE TABLET 

BY MOUTH FOUR TIMES A DAY

                10/06/2020      11/15/2020      Inactive         

 

                    alprazolam 0.5 mg tablet RxNorm: 964413      1 Tablet(s) Ora

l two times a day as 

needed anxiety  2020      10/21/2020      Inactive         

 

                    alprazolam 0.5 mg tablet RxNorm: 504654      1 Tablet(s) Ora

l two times a day as 

needed anxiety  2020      Inactive         

 

                escitalopram 5 mg tablet RxNorm: 439240  1 Tablet(s) Oral every 

night at bedtime 

2020          Inactive             

 

             prednisone 20 mg tablet RxNorm: 908914 2 Tablet(s) Oral every day 0

2020                Inactive                   

 

                    Depo-Provera 150 mg/mL intramuscular suspension RxNorm: 1000

128     0.75 

Milliliter(s) Intramuscular 2020      Inactive         

 

                    Kenalog 40 mg/mL suspension for injection RxNorm: 1553776   

  1 Milliliter(s) 

Injection       2020      Inactive         

 

                Zithromax Z-Timur 250 mg tablet RxNorm: 122508  Tablet(s) Oral as 

directed 

2020          Inactive             

 

                    alprazolam 0.5 mg tablet RxNorm: 860881      1 Tablet(s) Ora

l two times a day as 

needed anxiety  2020      Inactive         

 

                    alprazolam 0.5 mg tablet RxNorm: 604671      1 Tablet(s) Ora

l two times a day as 

needed anxiety  2020      Inactive         

 

                    Depo-Provera 150 mg/mL intramuscular suspension RxNorm: 1000

128     Milliliter(s) 

Intramuscular   2020      Inactive         

 

                    acyclovir 400 mg tablet RxNorm: 513017      1 Tablet(s) Oral

 four times a day fever 

blisters        2020      Inactive         

 

                    Depo-Provera 150 mg/mL intramuscular suspension RxNorm: 1000

128     Milliliter(s) 

Intramuscular   2020      Inactive         

 

                pravastatin 10 mg tablet RxNorm: 178331  1 Tablet(s) Oral every 

night at bedtime 

2020          Inactive             

 

                pravastatin 10 mg tablet RxNorm: 264129  1 Tablet(s) Oral every 

night at bedtime 

2020          Inactive             

 

                    Depo-Estradiol 5 mg/mL intramuscular oil RxNorm: 685239     

 3/4 Milliliter(s) 

Intramuscular monthly 2020      Inactive         

 

                    Kenalog 40 mg/mL suspension for injection RxNorm: 5493069   

  Milliliter(s) 

Injection       2019      Inactive         

 

                    Phenergan with Codeine Syrup RxNorm:             5-10 Millil

iter(s) Oral Every 6 hrs as 

needed          2019      Inactive         

 

             prednisone 20 mg tablet RxNorm: 983847 2 Tablet(s) Oral every day 1

2019                Inactive                  start tomorrow

 

                    doxycycline hyclate 100 mg tablet RxNorm: 3051458     1 Tabl

et(s) Oral two times a 

day             2019      Inactive        start if symptom

s do not improve

 

                    Depo-Estradiol 5 mg/mL intramuscular oil RxNorm: 173033     

 3/4 Milliliter(s) 

Intramuscular monthly 2019      Inactive         

 

                    Depo-Estradiol 5 mg/mL intramuscular oil RxNorm: 568863     

 3/4 Milliliter(s) 

Intramuscular monthly 10/31/2019      2019      Inactive         

 

                    gabapentin 100 mg capsule RxNorm: 603594      1 Capsule(s) O

ral every night at 

bedtime         10/16/2019      2019      Inactive         

 

             gabapentin 100 mg capsule RxNorm: 029918 1 Capsule(s) PO QHS 09/12/

2019   

10/11/2019                Inactive                   

 

           naproxen 500 mg tablet RxNorm: 402822 1 Tablet(s) PO BID 2019 0

2019 

Inactive                                 

 

             escitalopram 5 mg tablet RxNorm: 065766 1 Tablet(s) PO QHS 08/15/20

19   2019

                          Inactive                   

 

          CoQ-10 oral RxNorm: 55030 oral      2021           Active     

 

             ranitidine 150 mg capsule RxNorm: 171789 1 Capsule(s) PO BID 2019                Inactive                   

 

             eszopiclone 1 mg tablet RxNorm: 327766 Tablet(s) PO as needed 2020                Inactive                   

 

                acyclovir 400 mg tablet RxNorm: 827976  1 Tablet(s) PO PRN fever

 blisters 

2020          Inactive             

 

             clonidine HCl 0.1 mg tablet RxNorm: 400710 Tablet(s) PO as needed 0

8/15/2019   

2019                Inactive                   







Medication Administered





             Medication   Codes        Instructions Start Date   Status

 

                Kenalog 40 mg/mL suspension for injection RxNorm: 3147864 1Milli

liter     10/25/2021

                                        No longer Active

 

                Depo-Estradiol 5 mg/mL intramuscular oil RxNorm: 643970  0.75Mil

liliter  

2021                              No longer Active

 

                Depo-Estradiol 5 mg/mL intramuscular oil RxNorm: 967323  0.75Mil

liliter  

2021                              No longer Active

 

                Depo-Provera 150 mg/mL intramuscular suspension RxNorm: 4943162 

Milliliter      

2021                              No longer Active

 

                Depo-Provera 150 mg/mL intramuscular suspension RxNorm: 3638459 

Milliliter      

2021                              No longer Active

 

                Depo-Provera 150 mg/mL intramuscular suspension RxNorm: 5487638 

Milliliter      

10/20/2020                              No longer Active

 

                Depo-Provera 150 mg/mL intramuscular suspension RxNorm: 7067369 

0.75Milliliter  

2020                              No longer Active

 

                Kenalog 40 mg/mL suspension for injection RxNorm: 3427379 1Milli

liter     2020

                                        No longer Active

 

                Depo-Provera 150 mg/mL intramuscular suspension RxNorm: 8273394 

Milliliter      

2020                              No longer Active

 

                Depo-Provera 150 mg/mL intramuscular suspension RxNorm: 2232826 

Milliliter      

2020                              No longer Active

 

                Depo-Estradiol 5 mg/mL intramuscular oil RxNorm: 637524  3/4Mill

ilitermonthly 

2020                              Active

 

             Kenalog 40 mg/mL suspension for injection RxNorm: 4079849 Millilite

r   2019   

No longer Active

 

                Depo-Estradiol 5 mg/mL intramuscular oil RxNorm: 115386  3/4Mill

ilitermonthly 

2019                              Active







Immunizations

No Immunization data



Results





          Observation Observation Code Item      Item Code Result    Date      S

[facility] Location

 

          Cbc With Differential Ord2      WBC                 11.77 K/ul 

021 Unknown

 

          Cbc With Differential Ord2      RBC                 4.50 M/ul 20

21 Unknown

 

          Cbc With Differential Ord2      HGB                 13.6 g/dl 20

21 Unknown

 

          Cbc With Differential Ord2      Neut%               69.1 %    20

21 Unknown

 

          Cbc With Differential Ord2      HCT                 43.0 %    20

21 Unknown

 

          Cbc With Differential Ord2      MCV                 95.6 fl   20

21 Unknown

 

          Cbc With Differential Ord2      Lymph%              22.0 %    20

21 Unknown

 

          Cbc With Differential Ord2      MCH                 30.2 pg   20

21 Unknown

 

          Cbc With Differential Ord2      Mono%               6.0 %     20

21 Unknown

 

          Cbc With Differential Ord2      Eos%                2.6 %     20

21 Unknown

 

          Cbc With Differential Ord2      MCHC                31.6 pg   20

21 Unknown

 

          Cbc With Differential Ord2      PLT                 288 K/ul  20

21 Unknown

 

          Cbc With Differential Ord2      Baso%               0.3 %     20

21 Unknown

 

          Cbc With Differential Ord2      RDW                 13.5 %    20

21 Unknown

 

          Cbc With Differential Ord2      Neut ABS#           8.13 K/ul 20

21 Unknown

 

          Cbc With Differential Ord2      Lymph ABS#           2.59 K/ul 

021 Unknown

 

          Cbc With Differential Ord2      Mono ABS#           0.7 K/ul  20

21 Unknown

 

          Cbc With Differential Ord2      Eos ABS#            0.3 K/ul  20

21 Unknown

 

          Cbc With Differential Ord2      Baso ABS#           0.0 K/ul  20

21 Unknown

 

          Comp Metabolic Pjy838    NA                  142 mEq/L 2021 Unkn

own

 

          Comp Metabolic Rjy114    K                   4.4 mEq/L 2021 Unkn

own

 

          Comp Metabolic Isa447    CL                  101 mEq/L 2021 Unkn

own

 

          Comp Metabolic Pfi076    CO2                 32.0 mEq/L 2021 Unk

nown

 

          Comp Metabolic Gwf765    ANION GAP           13        2021 Unkn

own

 

          Comp Metabolic Tec629    GLUCOSE             89 mg/dL  2021 Unkn

own

 

          Comp Metabolic Sak528    Creat               0.7 mg/dL 2021 Unkn

own

 

          Comp Metabolic Gci771    eGFR                90 ml/min/1.73m2 20

21 Unknown

 

          Comp Metabolic Tyd186    BUN                 19 mg/dL  2021 Unkn

own

 

          Comp Metabolic Kvy817    B/C Ratio           26.4 Ratio 2021 Unk

nown

 

          Comp Metabolic Omj335    CALCIUM             9.2 mg/dL 2021 Unkn

own

 

          Comp Metabolic Hcb561    ALK PHOS            85 U/L    2021 Unkn

own

 

          Comp Metabolic Jvx365    AST(SGOT)           16 U/L    2021 Unkn

own

 

          Comp Metabolic Yik009    ALT(SGPT)           19 U/L    2021 Unkn

own

 

          Comp Metabolic Bnn220    BILI T              0.6 mg/dL 2021 Unkn

own

 

          Comp Metabolic Kga846    ALBUMIN             4.2 g/dL  2021 Unkn

own

 

          Comp Metabolic Pee511    TPRO                7.1 g/dL  2021 Unkn

own

 

          Comp Metabolic Yxa517    GLOB                2.9 g/dL  2021 Unkn

own

 

          Comp Metabolic Hau518    A/G Ratio           1.5 Ratio 2021 Unkn

own

 

          Comp Metabolic Ufc734    Osmo                285 mOsmo 2021 Unkn

own

 

          Tsh       Ord6      TSH (3rd IS)           1.14 uIU/mL 2021 Unkn

own

 

          Lipid     Ord30     CHOL                233 mg/dL 2021 Unknown

 

          Lipid     Ord30     HDL                 54.0 mg/dl 2021 Unknown

 

          Lipid     Ord30     TRIG                101 mg/dL 2021 Unknown

 

          Lipid     Ord30     LDL                 159 mg/dL 2021 Unknown

 

          Lipid     Ord30     C/HDL               4.3 Ratio 2021 Unknown

 

          Lipid     Ord30     CHOL                249 mg/dL 10/22/2020 Unknown

 

          Lipid     Ord30     HDL                 55.0 mg/dl 10/22/2020 Unknown

 

          Lipid     Ord30     TRIG                82 mg/dL  10/22/2020 Unknown

 

          Lipid     Ord30     LDL                 178 mg/dL 10/22/2020 Unknown

 

          Lipid     Ord30     C/HDL               4.5 Ratio 10/22/2020 Unknown

 

          Comp Metabolic Mrs616    NA                  138 mEq/L 10/22/2020 Unkn

own

 

          Comp Metabolic Flb245    K                   4.5 mEq/L 10/22/2020 Unkn

own

 

          Comp Metabolic Lxy616    CL                  103 mEq/L 10/22/2020 Unkn

own

 

          Comp Metabolic Tnx153    CO2                 28.0 mEq/L 10/22/2020 Unk

nown

 

          Comp Metabolic Jva080    ANION GAP           12        10/22/2020 Unkn

own

 

          Comp Metabolic Ead951    GLUCOSE             90 mg/dL  10/22/2020 Unkn

own

 

          Comp Metabolic Nfo181    Creat               0.7 mg/dL 10/22/2020 Unkn

own

 

          Comp Metabolic Kqm121    eGFR                88 ml/min/1.73m2 10/22/20

20 Unknown

 

          Comp Metabolic Ord661    BUN                 20 mg/dL  10/22/2020 Unkn

own

 

          Comp Metabolic Tos050    B/C Ratio           27.0 Ratio 10/22/2020 Unk

nown

 

          Comp Metabolic Ctq446    CALCIUM             9.5 mg/dL 10/22/2020 Un[facility]

own

 

          Comp Metabolic Rpv307    ALK PHOS            87 U/L    10/22/2020 Unkn

own

 

          Comp Metabolic Mss286    AST(SGOT)           17 U/L    10/22/2020 Unkn

own

 

          Comp Metabolic Mzi098    ALT(SGPT)           20 U/L    10/22/2020 Unkn

own

 

          Comp Metabolic Hqp869    BILI T              0.6 mg/dL 10/22/2020 Unkn

own

 

          Comp Metabolic Vri977    ALBUMIN             4.5 g/dL  10/22/2020 Unkn

own

 

          Comp Metabolic Cax251    TPRO                7.6 g/dL  10/22/2020 Un[facility]

own

 

          Comp Metabolic Eeo908    GLOB                3.1 g/dL  10/22/2020 Un[facility]

own

 

          Comp Metabolic Eos693    A/G Ratio           1.5 Ratio 10/22/2020 Un[facility]

own

 

          Comp Metabolic Izc992    Osmo                278 mOsmo 10/22/2020 Un[facility]

own

 

          Cbc With Differential Ord2      WBC                 10.56 K/ul 10/22/2

020 Unknown

 

          Cbc With Differential Ord2      RBC                 4.44 M/ul 10/22/20

20 Unknown

 

          Cbc With Differential Ord2      HGB                 14.0 g/dl 10/22/20

20 Unknown

 

          Cbc With Differential Ord2      HCT                 42.8 %    10/22/20

20 Unknown

 

          Cbc With Differential Ord2      Neut%               73.9 %    10/22/20

20 Unknown

 

          Cbc With Differential Ord2      MCV                 96.4 fl   10/22/20

20 Unknown

 

          Cbc With Differential Ord2      Lymph%              18.2 %    10/22/20

20 Unknown

 

          Cbc With Differential Ord2      MCH                 31.5 pg   10/22/20

20 Unknown

 

          Cbc With Differential Ord2      Mono%               5.4 %     10/22/20

20 Unknown

 

          Cbc With Differential Ord2      Eos%                2.2 %     10/22/20

20 Unknown

 

          Cbc With Differential Ord2      MCHC                32.7 pg   10/22/20

20 Unknown

 

          Cbc With Differential Ord2      PLT                 258 K/ul  10/22/20

20 Unknown

 

          Cbc With Differential Ord2      Baso%               0.3 %     10/22/20

20 Unknown

 

          Cbc With Differential Ord2      Neut ABS#           7.81 K/ul 10/22/20

20 Unknown

 

          Cbc With Differential Ord2      RDW                 13.4 %    10/22/20

20 Unknown

 

          Cbc With Differential Ord2      Lymph ABS#           1.92 K/ul 10/22/2

020 Unknown

 

          Cbc With Differential Ord2      Mono ABS#           0.6 K/ul  10/22/20

20 Unknown

 

          Cbc With Differential Ord2      Eos ABS#            0.2 K/ul  10/22/20

20 Unknown

 

          Cbc With Differential Ord2      Baso ABS#           0.0 K/ul  10/22/20

20 Unknown

 

          Tsh       Ord6      TSH (3rd IS)           1.38 uIU/mL 2020 [facility]

own

 

          Comp Metabolic Rsc589    NA                  141 mEq/L 2020 Un[facility]

own

 

          Comp Metabolic Oub720    K                   4.3 mEq/L 2020 Un[facility]

own

 

          Comp Metabolic Bth500    CL                  104 mEq/L 2020 [facility]

own

 

          Comp Metabolic Fod118    CO2                 29.0 mEq/L 2020 Unk

nown

 

          Comp Metabolic Ozh907    ANION GAP           12        2020 Un[facility]

own

 

          Comp Metabolic Nsy032    GLUCOSE             83 mg/dL  2020 [facility]

own

 

          Comp Metabolic Ecf182    Creat               0.7 mg/dL 2020 [facility]

own

 

          Comp Metabolic Sao635    eGFR                102 ml/min/1.73m2 

020 Unknown

 

          Comp Metabolic Wze495    BUN                 16 mg/dL  2020 Un[facility]

own

 

          Comp Metabolic Chf350    B/C Ratio           24.6 Ratio 2020 Un[facility]

nown

 

          Comp Metabolic Scv467    CALCIUM             9.3 mg/dL 2020 [facility]

own

 

          Comp Metabolic Jhu515    ALK PHOS            79 U/L    2020 Un[facility]

own

 

          Comp Metabolic Glq453    AST(SGOT)           19 U/L    2020 Un[facility]

own

 

          Comp Metabolic Tnp958    ALT(SGPT)           26 U/L    2020 Un[facility]

own

 

          Comp Metabolic Tun410    BILI T              0.5 mg/dL 2020 [facility]

own

 

          Comp Metabolic Zwb896    ALBUMIN             4.2 g/dL  2020 [facility]

own

 

          Comp Metabolic Djc578    TPRO                7.0 g/dL  2020 [facility]

own

 

          Comp Metabolic Wkz410    GLOB                2.8 g/dL  2020 Un[facility]

own

 

          Comp Metabolic Gqi553    A/G Ratio           1.5 Ratio 2020 [facility]

own

 

          Comp Metabolic Rqj550    Osmo                282 mOsmo 2020 Unkn

own

 

          Cbc With Differential Ord2      WBC                 8.94 K/ul 20

20 Unknown

 

          Cbc With Differential Ord2      RBC                 4.34 M/ul 20

20 Unknown

 

          Cbc With Differential Ord2      HGB                 13.3 g/dl 20

20 Unknown

 

          Cbc With Differential Ord2      HCT                 40.7 %    20

20 Unknown

 

          Cbc With Differential Ord2      Neut%               71.8 %    20

20 Unknown

 

          Cbc With Differential Ord2      MCV                 93.8 fl   20

20 Unknown

 

          Cbc With Differential Ord2      Lymph%              19.5 %    20

20 Unknown

 

          Cbc With Differential Ord2      MCH                 30.6 pg   20

20 Unknown

 

          Cbc With Differential Ord2      Mono%               6.2 %     20

20 Unknown

 

          Cbc With Differential Ord2      Eos%                2.2 %     20

20 Unknown

 

          Cbc With Differential Ord2      MCHC                32.7 pg   20

20 Unknown

 

          Cbc With Differential Ord2      Baso%               0.3 %     20

20 Unknown

 

          Cbc With Differential Ord2      PLT                 262 K/ul  20

20 Unknown

 

          Cbc With Differential Ord2      RDW                 13.4 %    20

20 Unknown

 

          Cbc With Differential Ord2      Neut ABS#           6.42 K/ul 20

20 Unknown

 

          Cbc With Differential Ord2      Lymph ABS#           1.74 K/ul 

020 Unknown

 

          Cbc With Differential Ord2      Mono ABS#           0.6 K/ul  20

20 Unknown

 

          Cbc With Differential Ord2      Eos ABS#            0.2 K/ul  20

20 Unknown

 

          Cbc With Differential Ord2      Baso ABS#           0.0 K/ul  20

20 Unknown

 

          Lipid     Ord30     CHOL                240 mg/dL 2020 Unknown

 

          Lipid     Ord30     HDL                 57.0 mg/dl 2020 Unknown

 

          Lipid     Ord30     TRIG                80 mg/dL  2020 Unknown

 

          Lipid     Ord30     LDL                 167 mg/dL 2020 Unknown

 

          Lipid     Ord30     C/HDL               4.2 Ratio 2020 Unknown

 

          C A/B FLU [id number]   Influenza A Scr           Negative  2019 [facility]

nown

 

          C A/B FLU [id number]   Influenza B Scr           Negative  2019 Unk

nown

 

           C A/B FLU  [id number]    Influenza Intrp B AG:PRID:PT:NOSE:NOM:IF       

     See Footnote  

2019                              Unknown







Procedures





                    Procedure           Codes               Date

 

                    THER/PROPH/DIAG INJ SC/IM CPT-4: 00203        2021

 

                    THER/PROPH/DIAG INJ SC/IM CPT-4: 32608        2021

 

                    THER/PROPH/DIAG INJ SC/IM CPT-4: 97144        2021

 

                    THER/PROPH/DIAG INJ SC/IM CPT-4: 54550        2021

 

                    THER/PROPH/DIAG INJ SC/IM CPT-4: 12188        10/20/2020

 

                    THER/PROPH/DIAG INJ SC/IM CPT-4: 71061        2020

 

                    TRIAMCINOLONE ACET INJ NOS 10 mg CPT-4:         

020

 

                    THER/PROPH/DIAG INJ SC/IM CPT-4: 34968        2020

 

                    THER/PROPH/DIAG INJ SC/IM CPT-4: 79890        2020

 

                    THER/PROPH/DIAG INJ SC/IM CPT-4: 53742        2020

 

                    TRIAMCINOLONE ACET INJ NOS 10 mg CPT-4:         

019

 

                    THER/PROPH/DIAG INJ SC/IM CPT-4: 23887        2019







Vital Signs





                          Date                      Vital

 

                2021      Blood Pressure 1: 110/80 Code: 8480-6 BMI: 30.2 

Code: 84372-4 Heart 

Rate 1: 88 bpm      Height: 5'2" Code: 8302-2 SpO2: 98%           Temperature: 3

6.8 (C) / 98.2 

(F)                                     Weight: 165 lbs  Code: 59977-1

 

                2021      Blood Pressure 1: 156/82 Code: 8480-6 Heart Rate

 1: 81 bpm Height: 

5'2" Code: 8302-2   Height: 5'2" Code: 8302-2 SpO2: 99%           Temperature: 3

6.2 (C) / 

97.1 (F)                                Weight:  Code: 92602-3

 

                10/25/2021      Blood Pressure 1: 142/68 Code: 8480-6 BMI: 30.7 

Code: 26783-6 Heart 

Rate 1: 63 bpm      Height: 5'2" Code: 8302-2 SpO2: 97%           Temperature: 3

6.4 (C) / 97.6 

(F)                                     Weight: 168 lbs  Code: 41459-4

 

                2020      Blood Pressure 1: 130/80 Code: 8480-6 BMI: 28.5 

Code: 37206-6 Heart 

Rate 1: 69 bpm      Height: 5'2" Code: 8302-2 SpO2: 97%           Temperature: 3

6.9 (C) / 98.4 

(F)                                     Weight: 156 lbs  Code: 37501-8

 

                2020      Blood Pressure 1: 128/78 Code: 8480-6 Heart Rate

 1: 74 bpm Height:  

Code: 8302-2              SpO2: 98%                 Weight:  Code: 24577-0

 

                    2020          Height:  Code: 8302-2 Weight:  Code: 294

63-7

 

                2020      Blood Pressure 1: 132/80 Code: 8480-6 BMI: 28.9 

Code: 46554-2 Heart 

Rate 1: 68 bpm      Height: 5'2" Code: 8302-2 SpO2: 97%           Weight: 158 lb

s  Code: 

66645-2

 

                2019      Blood Pressure 1: 110/60 Code: 8480-6 BMI: 28.5 

Code: 29605-3 Heart 

Rate 1: 76 bpm      Height: 5'2" Code: 8302-2 SpO2: 98%           Temperature: 3

6.9 (C) / 98.5 

(F)                                     Weight: 156 lbs  Code: 76286-7

 

             2019   Blood Pressure 1: 124/80 Code: 8480-6 Heart Rate 1: 81

 bpm SpO2: 98%    

Temperature: 36.7 (C) / 98.1 (F)

 

                2019      Blood Pressure 1: 130/78 Code: 8480-6 BMI: 28.0 

Code: 44189-1 Heart 

Rate 1: 68 bpm      Height: 5'2" Code: 8302-2 SpO2: 98%           Weight: 153 lb

s  Code: 

62337-3

 

                2019      Heart Rate 1: 68 bpm Height:  Code: 8302-2 Weigh

t:  Code: 32744-3

 

                2019      Blood Pressure 1: 132/74 Code: 8480-6 BMI: 27.4 

Code: 28461-9 Heart 

Rate 1: 66 bpm      Height: 5'2" Code: 8302-2 SpO2: 98%           Weight: 150 lb

s  Code: 

21289-7

 

                08/15/2019      Blood Pressure 1: 104/60 Code: 8480-6 BMI: 27.5 

Code: 37338-3 Heart 

Rate 1: 66 bpm      Height: 5'2" Code: 8302-2 SpO2: 98%           Weight: 150 lb

s 5 oz Code: 

91803-0







Functional Status

No Functional Status data



Reason For Visit





                    Reason For Visit    Effective Dates     Notes

 

                    cough               2021           

 

                    cough               2021           

 

                    sinus congestion    10/25/2021           

 

                    headache            2020           

 

                    headache            2020           

 

                    well woman exam (40-65 years) 2020           

 

                    cough               2019           

 

                    fever               2019           

 

                    menopausal symptoms 2019           

 

                    lower leg pain      2019           

 

                    lower leg pain      2019           

 

                    anxiety             08/15/2019           







Encounters





             Encounter    Performer    Location     Codes        Date

 

                                        () 84345 EST. PATIENT, LEVEL IV

Diagnosis: Chronic cough[ICD10: R05.3]

Diagnosis: Laryngitis[ICD10: J04.0]

Diagnosis: Other allergic rhinitis[ICD10: J30.89]

Diagnosis: Dysphagia[ICD10: r13.10] Eulalia Crump MD, [facility]

PT-4: 

12776                                   2021

 

                                        [phone] 56633 EST. PATIENT, LEVEL IV

Diagnosis: Acute bronchitis[ICD10: J20.9]

Diagnosis: Laryngitis[ICD10: J04.0]

Diagnosis: Other allergic rhinitis[ICD10: J30.89]

Diagnosis: Slow transit constipation[ICD10: K59.01] [facility]                   CPT-4: 54340              2021

 

                                        [phone]) 10799 EST. PATIENT, LEVEL IV

Diagnosis: Generalized anxiety disorder[ICD10: F41.9]

Diagnosis: Cough[ICD10: R05.9]

Diagnosis: Other allergic rhinitis[ICD10: J30.89]

Diagnosis: Encounter for screening mammogram for malignant neoplasm of 
breast[ICD10: Z12.31] Eulalia Crump MD, [facility] CPT-4: 88094    

10/25/2021

 

                                        [phone] 21233 EST. PATIENT, LEVEL III

Diagnosis: Headache[ICD10: R51]

Diagnosis: Other allergic rhinitis[ICD10: J30.89] Bree Gutierres MD, [facility]          CPT-4: 77034              2020

 

                                        73203 EST. PATIENT, LEVEL III

Diagnosis: Other acute sinusitis[ICD10: J01.80]

Diagnosis: Other allergic rhinitis[ICD10: J30.89]

Diagnosis: Migraine without status migrainosus, not intractable, unspecified 
migraine type[ICD10: G43.909]

Diagnosis: Menopausal and female climacteric states[ICD10: N95.1] [facility]    CPT-4: 41642              2020

 

                                        [phone] 23139 EST. PATIENT, LEVEL III

Diagnosis: Generalized anxiety disorder[ICD10: F41.1] [facility]          CPT-4: 53478              2020

 

                                        ([id number]) PREV VISIT EST AGE 40-64

Diagnosis: Encounter for gynecological examination (general) (routine) without 
abnormal findings[ICD10: Z01.419] Bree Crump MD, [facility] CPT

-4:

82040                                   2020

 

                                        [phone] 05128 EST. PATIENT, LEVEL III

Diagnosis: Cough[ICD10: R05]

Diagnosis: Acute bronchitis[ICD10: J20.9] Bree carrasco MD, 

[facility]                       CPT-4: 18848              2019

 

                                        [phone] 42903 EST. PATIENT, LEVEL III

Diagnosis: Fever[ICD10: R50.9]

Diagnosis: Body aches[ICD10: R52] Bree Crump MD, [facility] CPT

-4:

13110                                   2019

 

                                        [phone] 44264 EST. PATIENT, LEVEL III

Diagnosis: Menopausal and female climacteric states[ICD10: N95.1] Bree Crump MD, [facility] CPT-4: 14859        2019

 

                                        89362 EST. PATIENT, LEVEL III

Diagnosis: Pain in left ankle and joints of left foot[ICD10: M25.572]

Diagnosis: Pain in left knee[ICD10: M25.562] Flakita jackson MD, [facility]

                          CPT-4: 90211              2019

 

                                        47455 EST. PATIENT, LEVEL III

Diagnosis: Pain in left ankle and joints of left foot[ICD10: M25.572]

Diagnosis: Pain in left knee[ICD10: M25.562] Flakita jackson MD, [facility]

                          CPT-4: 17460              2019

 

                                        (14237) PREV VISIT NEW AGE 40-64

Diagnosis: Encounter for general adult medical examination without abnormal 
findings[ICD10: Z00.00] [facility] CPT-4: 64681    

08/15/2019







Plan of Care





             Planned Activity Notes        Codes        Status       Date

 

                          Visit Plan:               Chronic cough/laryngitis - h

as been persistent for the last 6 

weeks, despite treatment of allergies, GERD, and URI with steroids and 
antibiotic. Will obtain CXR today and rx for Albuterol inhaler sent to pharmacy 
to see if improvement with use. Dysphagia - pt reports feeling as though food is
getting caught in her esophagus. Pt was being treated for possible silent GERD 
as has had a persistent cough despite improvement in sinus congestion and 
drainage. Per pt request will refer for EGD with Dr. Wayne. Pt reports family 
history of esophageal stricture. Allergies - pt reports sinus congestion and dra monae has significantly improved since taking Allegra daily.

                                                            2021

 

             Patient Education: Patient Medication Summary                      

     Completed    2021

 

                    Care Plan: CHEST X-RAY 2VW FRONTAL&LATL                     

LOINC : 86558-7

                          Pending                   2021

 

                          Visit Plan:               Allergies - recent increase 

in nasal congestion that has been 

worsening cough. Has tried Claritin in the past without any relief. Discussed 
use of being aggressive with allergies to help decrease cough and loss of voice.
Discussed use of Allegra twice a day x 5 days then decrease down to daily. BP 
elevated the last 2 visits, so discouraged use of Sudafed. Discussed us of 
Cloricidin HP as needed for congestions. Laryngitis - secondary to acute 
bronchitis that has worsened over the last month. Cough has improved, but pt 
reports tightness when lying down. Discussed possible silent GERD due to increas
e in coughing. Recommended taking Omeprazole daily x 2 weeks. Acute Bronchitis -
cough improved, denies chest congestion. Completed 2 rounds of antibiotics and 2
rounds of steroids. Discussed treat more aggressive with allergies and GERD. Pt 
encouraged to follow up in 2 weeks if no improvement. Slow transit constipation 
- encouraged use Miralax 1 capful daily, may titrate based on needed. Allergies 
exacerbation with cough - chronic - recommended pt to use allergy medication as 
prescribed. Pt has been counseled as to the appropriate use of the medication. 
Pt to call if allergy symptoms are not controlled with the medication. If using 
nasal spray, instructions as follows: Nasal spray- use twice daily, one spray 
per nostril twice daily, after 30 minutes, rinse out nose with saline spray.. 
Use opposite hand per nostril to spray in the nasal steroid allergy spray. 
Kenalog injection given at visit. Prednisone rx sent. Phenergan with codeine 
cough medicine sent. Screening mammogram - orders sent. Anxiety - controlled. 
Continue prn alprazolam at bedtime and escitalopram. Fasting lab orders sent

                                                            2021

 

                                        Appointment: Eulalia Osman 

WPtel:[phone]

                                        [address] [id number]

                                              (30 min) Complex 2021

 

             Patient Education: Patient Medication Summary                      

     Completed    2021

 

             Patient Education: Patient Medication Summary                      

     Completed    2021

 

                          Visit Plan:               Allergies exacerbation with 

cough - chronic - recommended pt to use

allergy medication as prescribed. Pt has been counseled as to the appropriate 
use of the medication. Pt to call if allergy symptoms are not controlled with 
the medication. If using nasal spray, instructions as follows: Nasal spray- use 
twice daily, one spray per nostril twice daily, after 30 minutes, rinse out nose
with saline spray.. Use opposite hand per nostril to spray in the nasal steroid 
allergy spray. Kenalog injection given at visit. Prednisone rx sent. Phenergan 
with codeine cough medicine sent. Screening mammogram - orders sent. Anxiety - 
controlled. Continue prn alprazolam at bedtime and escitalopram. Fasting lab 
orders sent

                                                            10/25/2021

 

                                        Appointment: Eulalia Osman 

WPtel:[phone]

                                        [address] [id number]

                                              (30 min) Complex 10/25/2021

 

             Patient Education: Patient Medication Summary                      

     Completed    10/25/2021

 

             Patient Education: prednisone- [employer] [id number]         

                  Completed    

10/25/2021

 

             Appointment:                            Injection    2021

 

             Patient Education: Patient Medication Summary                      

     Completed    2021

 

             Appointment:                            Injection    2021

 

             Patient Education: Patient Medication Summary                      

     Completed    2021

 

             Appointment:                            Injection    2021

 

             Appointment:                            Injection    2021

 

             Patient Education: Patient Medication Summary                      

     Completed    2021

 

             Appointment:                            Injection    2021

 

             Patient Education: Patient Medication Summary                      

     Completed    2021

 

             Patient Education: Patient Medication Summary                      

     Completed    11/10/2020

 

             Patient Education: Patient Medication Summary                      

     Completed    10/22/2020

 

             Appointment:                            Injection    10/20/2020

 

             Patient Education: Patient Medication Summary                      

     Completed    10/20/2020

 

                          Visit Plan:               Allergies - chronic - recomm

ended pt to use allergy medication as 

prescribed. Pt has been counseled as to the appropriate use of the medication. 
Pt to call if allergy symptoms are not controlled with the medication. If using 
nasal spray, instructions as follows: Nasal spray- use twice daily, one spray 
per nostril twice daily, after 30 minutes, rinse out nose with saline spray.. 
Use opposite hand per nostril to spray in the nasal steroid allergy spray. 
Headache -will treat allergies/congestion - instructed patient to call if does 
not improve and we will order CT Head without contrast.

                                                            2020

 

                                        Appointment: Bree Oleary 

WPtel:[phone]

                                        [address] [facility]

                                              (15 min) Moderate 2020

 

             Patient Education: Patient Medication Summary                      

     Completed    2020

 

                          Visit Plan:               Sinusitis - Pt has acute inf

ection - pain in face, maxillary 

region, Pt informed to use decongestant, RX given to patient, sinus rinses also 
recommended. Call if symptoms do not show improvement. Allergies - chronic - 
recommended pt to use allergy medication as prescribed. Pt has been counseled as
to the appropriate use of the medication. Pt to call if allergy symptoms are not
controlled with the medication. If using nasal spray, instructions as follows: 
Nasal spray- use twice daily, one spray per nostril twice daily, after 30 
minutes, rinse out nose with saline spray.. Use opposite hand per nostril to 
spray in the nasal steroid allergy spray. Migraine - will treat for sinus 
infection - pt is to notify clinic if symptoms do not improve, if they worsen, 
or with any changes, questions, or concerns.

                                                            2020

 

                                        Appointment: Flakita Castro 

WPtel:[phone]

                                        [address] [facility]

                                              (30 min) Complex 2020

 

             Patient Education: Patient Medication Summary                      

     Completed    2020

 

                          Visit Plan:               Anxiety - the patient has un

controlled anxiety and will benefit 

from a short term dose of xanax for control of symptoms.

                                                            2020

 

                                        Appointment: Yesica Crump 

WPtel:[phone]

                                        [facility]

                                              TeleHealth      2020

 

             Patient Education: Patient Medication Summary                      

     Completed    2020

 

             Appointment:                            Injection    2020

 

             Patient Education: Patient Medication Summary                      

     Completed    2020

 

             Appointment:                            Injection    2020

 

             Appointment:                            Injection    2020

 

             Patient Education: Patient Medication Summary                      

     Completed    2020

 

                                        Appointment: Yesica Crump 

WPtel:[phone]

                                        [facility]

                                              Well Woman      03/10/2020

 

                          Visit Plan:               Well Adult Female - exam com

pleted. Pap and breast exam completed. 

Pt will be called with results of her testing. She was advised to continue with 
yearly annual exams. Safe sex practices discussed during office visit today. 
Call if any abnormal gynecologic issues during the next year, otherwise, RTC 
yearly or prn.

                                                            2020

 

                                        Appointment: Bree Oleary 

WPtel:[phone]

                                        [facility]

                                              Well Woman      2020

 

             Patient Education: Patient Medication Summary                      

     Completed    2020

 

                                        Appointment: Yesica Crump 

WPtel:[phone]

                                        [facility]

                                              (15 min) Moderate 2020

 

             Appointment:                            Injection    2020

 

             Patient Education: Patient Medication Summary                      

     Completed    2020

 

                          Visit Plan:               Bronchitis - acute case of b

ronchitis identified. Pt has been given

antibiotics, breathing treatments as appropriate, and pt has been instructed to 
call if symptoms are not improved, or if symptoms acutely worsen.

                                                            2019

 

                                        Appointment: Bree Oleary 

WPtel:[phone]

                                        [address] [place], [place] [id number]

                                              (15 min) Moderate 2019

 

             Patient Education: Patient Medication Summary                      

     Completed    2019

 

                          Visit Plan:               URI -viral -flu swab to r/o 

flu - Pt advised to increase fluids, 

vitamin C. Discussed natural and expected course of this diagnosis and need to 
alert me if symptoms do not follow expected course, or if any worse.

                                                            2019

 

                                        Appointment: Bree Oleary 

WPtel:[phone]

                                        [facility]

                                              (30 min) Complex 2019

 

             Patient Education: Patient Medication Summary                      

     Completed    2019

 

                          Visit Plan:               Post-surgical menopause - co

ntinue to taper depo dose and 

eventually off medication completely -injection today in the office.

                                                            2019

 

                                        Appointment: Bree Oleary 

WPtel:[phone]

                                        [facility]

                                              (15 min) Moderate 2019

 

             Patient Education: Patient Medication Summary                      

     Completed    2019

 

                                        Appointment: Bree Oleary 

WPtel:[phone]

                                        [address] [facility]

                                              (30 min) Complex 2019

 

                          Visit Plan:               Left knee, ankle, foot pain 

- will send RX - will refer to ortho - 

The pt is to use prn antiinflammatories to manage acute pain. The patient is to 
call the office if the pain is worsening or does not improve.

                                                            2019

 

                                        Appointment: Flakita Castro 

WPtel:[phone]

                                        [address] [facility]

                                              (30 min) Complex 2019

 

             Patient Education: Patient Medication Summary                      

     Completed    2019

 

                    Care Plan: Referral Order                     SNOMED-CT : 30

[id number]

                          Pending                   2019

 

                          Visit Plan:               Left knee, ankle, foot pain 

- will send RX - if no improvement will

refer to ortho - The pt is to use prn antiinflammatories to manage acute pain. 
The patient is to call the office if the pain is worsening or does not improve.

                                                            2019

 

                                        Appointment: Flakita Castro 

WPtel:[phone]

                                        [facility]

                                              (30 min) Complex 2019

 

             Patient Education: Patient Medication Summary                      

     Completed    2019

 

                          Visit Plan:               Well Adult - pt was counsele

d about diet, exercise, and encouraged 

to follow a heart healthy diet and increase activity level. The patient was 
instructed to RTC yearly for well adult exams and PRN for acute illnesses. The 
pt was also instructed to have yearly labs for check of cholesterol, thyroid, 
chem panel, CBC, and renal functioning. Post-surgical menopause - I have advised
the pt that we will look for her medications from [facility] to find out what her depo 
dose is so I can wean her off of it. Anxiety - the patient has uncontrolled 
anxiety and will benefit from an SSRI on a daily basis to attempt control of the
symptoms of anxiety (tachycardia, overwhelming sensations, stress, insomnia, 
etc). stop clonidine due to hypotension

                                                            08/15/2019

 

             Patient Education: Patient Medication Summary                      

     Completed    08/15/2019

 

             Referral: Álvaro Sellers  Referral                  Appointment Co

nfirmed  

 

             Referral: Álvaro Sellers  Referral                  Completed     







Instructions





                          Comment                   Date

 

                                        CHEST XRAY TODAY

ALBUTEROL INHALER 2 PUFFS EVERY 4-6 HOURS AS NEEDED

WILL SEND REFERRAL TO DR WAYNE FOR EGD

WILL DISCUSS NEXT FOLLOW UP AFTER CHEST XRAY AND EFFECTIVENESS OF INHALER. CALL 
WITH UPDATE IN 1 WEEK.

                                        . Chronic cough/laryngitis - has been pe

rsistent for the last 6 weeks, despite 

treatment of allergies, GERD, and URI with steroids and antibiotic. Will obtain 
CXR today and rx for Albuterol inhaler sent to pharmacy to see if improvement 
with use. 



Dysphagia - pt reports feeling as though food is getting caught in her 
esophagus. Pt was being treated for possible silent GERD as has had a persistent
cough despite improvement in sinus congestion and drainage. Per pt request will 
refer for EGD with Dr. Wayne. Pt reports family history of esophageal 
stricture. 



Allergies - pt reports sinus congestion and drainage has significantly improved 
since taking Allegra daily.             2021

 

                                        GENERIC ALLEGRA OTC 1 TABLET TWICE A DAY

 FOR 5 DAYS, THEN DECREASE TO ONCE A DAY



OMEPRAZOLE (GENERIC PRILOSEC) 20MG TAKE 1 DAILY X 14



CLORICIDIN HP OVER THE COUNTER DECONGESTANT AS NEEDED WHEN YOU FEEL MORE 
CONGESTED



MIRALAX 17GM 1 CAPFUL MIX WITH WATER DAILY

DUE FOR ANNUAL MAMMOGRAM- order faxed



DUE TO ANNUAL FASTING LABS- orders given at visit



CONTINUE XYZOL OR ZYRTEC



STEROID SHOT TODAY



PREDNISONE TABLETS TAKE 2 TABLETS DAILY X 5 DAYS



PHENERGAN W/CODEINE COUGH MEDICINE

                                        . Allergies - recent increase in nasal c

ongestion that has been worsening cough.

Has tried Claritin in the past without any relief. Discussed use of being 
aggressive with allergies to help decrease cough and loss of voice. Discussed 
use of Allegra twice a day x 5 days then decrease down to daily. BP elevated the
last 2 visits, so discouraged use of Sudafed. Discussed us of Cloricidin HP as 
needed for congestions. 



Laryngitis - secondary to acute bronchitis that has worsened over the last 
month. Cough has improved, but pt reports tightness when lying down. Discussed 
possible silent GERD due to increase in coughing. Recommended taking Omeprazole 
daily x 2 weeks. 



Acute Bronchitis - cough improved, denies chest congestion. Completed 2 rounds 
of antibiotics and 2 rounds of steroids. Discussed treat more aggressive with 
allergies and GERD. Pt encouraged to follow up in 2 weeks if no improvement. 



Slow transit constipation - encouraged use Miralax 1 capful daily, may titrate 
based on needed. 

Allergies exacerbation with cough - chronic - recommended pt to use allergy 
medication as prescribed.  Pt has been counseled as  to the appropriate use of 
the medication.  Pt to call if allergy symptoms are not controlled with the 
medication.

If using nasal spray, instructions as follows: Nasal spray- use twice daily, one
spray per nostril twice daily, after 30 minutes, rinse out nose with saline 
spray.. Use opposite hand per nostril to spray in the nasal steroid allergy 
spray.  Kenalog injection given at visit. Prednisone rx sent. Phenergan with 
codeine cough medicine sent. 



Screening mammogram - orders sent.



Anxiety - controlled. Continue prn alprazolam at bedtime and escitalopram. 



Fasting lab orders sent







                                        2021

 

                                        DUE FOR ANNUAL MAMMOGRAM- order faxed



DUE TO ANNUAL FASTING LABS- orders given at visit



CONTINUE XYZOL OR ZYRTEC



STEROID SHOT TODAY



PREDNISONE TABLETS TAKE 2 TABLETS DAILY X 5 DAYS



PHENERGAN W/CODEINE COUGH MEDICINE

                                        . Allergies exacerbation with cough - ch

ronic - recommended pt to use allergy 

medication as prescribed.  Pt has been counseled as  to the appropriate use of 
the medication.  Pt to call if allergy symptoms are not controlled with the 
medication.

If using nasal spray, instructions as follows: Nasal spray- use twice daily, one
spray per nostril twice daily, after 30 minutes, rinse out nose with saline 
spray.. Use opposite hand per nostril to spray in the nasal steroid allergy 
spray.  Kenalog injection given at visit. Prednisone rx sent. Phenergan with 
codeine cough medicine sent. 



Screening mammogram - orders sent.



Anxiety - controlled. Continue prn alprazolam at bedtime and escitalopram. 



Fasting lab orders sent





                                        10/25/2021

 

                                        . Allergies - chronic - recommended pt t

o use allergy medication as prescribed. 

Pt has been counseled as  to the appropriate use of the medication.  Pt to call 
if allergy symptoms are not controlled with the medication.

If using nasal spray, instructions as follows: Nasal spray- use twice daily, one
spray per nostril twice daily, after 30 minutes, rinse out nose with saline 
spray.. Use opposite hand per nostril to spray in the nasal steroid allergy 
spray.



Headache -will treat allergies/congestion - instructed patient to call if does 
not improve and we will order CT Head without contrast. 

                                        2020

 

                                        . Sinusitis - Pt has acute infection - p

ain in face, maxillary region, Pt 

informed to use decongestant, RX given to patient, sinus rinses also 
recommended.  Call if symptoms do not show improvement.



Allergies - chronic - recommended pt to use allergy medication as prescribed.  
Pt has been counseled as  to the appropriate use of the medication.  Pt to call 
if allergy symptoms are not controlled with the medication.

If using nasal spray, instructions as follows: Nasal spray- use twice daily, one
spray per nostril twice daily, after 30 minutes, rinse out nose with saline 
spray.. Use opposite hand per nostril to spray in the nasal steroid allergy 
spray.



Migraine - will treat for sinus infection - pt is to notify clinic if symptoms 
do not improve, if they worsen, or with any changes, questions, or concerns.  

2020

 

                                        . Anxiety - the patient has uncontrolled

 anxiety and will benefit from a short 

term dose of xanax for control of symptoms. 2020

 

                                        . Well Adult Female - exam completed.  P

ap and  breast exam completed.  Pt will 

be called with results of her testing.  She was advised to continue with yearly 
annual exams.   Safe sex practices discussed during office visit today.  Call if
any abnormal gynecologic issues during the next year, otherwise, RTC yearly or 
prn.

                                        2020

 

                                        [facility] 

                                        . Bronchitis - acute case of bronchitis 

identified.  Pt has been given 

antibiotics, breathing treatments as appropriate, and pt has been instructed to 
call if symptoms are not improved, or if symptoms acutely worsen.

                                        2019

 

                                        . URI -viral -flu swab to r/o flu - Pt a

dvised to increase fluids, vitamin C.  

Discussed natural and expected course of this diagnosis and need to alert me if 
symptoms do not follow expected course, or if any worse.

                                        2019

 

                                        . Post-surgical menopause - continue to 

taper depo dose and eventually off 

medication completely -injection today in the office. 

                                        2019

 

                                        tomorrow at 10:15 with the nurse elbert benavides. Left knee, ankle, foot pain - 

will send RX - will refer to ortho - The pt is to use prn antiinflammatories to 
manage acute pain. The patient is to call the office if the pain is worsening or
does not improve. 



                                        2019

 

                                        . Left knee, ankle, foot pain - will sen

d RX - if no improvement will refer to 

ortho - The pt is to use prn antiinflammatories to manage acute pain. The 
patient is to call the office if the pain is worsening or does not improve. 

                                        2019

 

                                        . Well Adult - pt was counseled about di

et, exercise, and encouraged to follow a

heart healthy diet and increase activity level.  The patient was instructed to 
RTC yearly for well adult exams and PRN for acute illnesses.  The pt was also 
instructed to have yearly labs for check of cholesterol, thyroid, chem panel, 
CBC, and renal functioning.



Post-surgical menopause - I have advised the pt that we will look for her 
medications from [facility] to find out what her depo dose is so I can wean her off of 
it.



Anxiety - the patient has uncontrolled anxiety and will benefit from an SSRI on 
a daily basis to attempt control of the symptoms of anxiety (tachycardia, 
overwhelming sensations, stress, insomnia, etc). 

stop clonidine due to hypotension       08/15/2019







Medical Equipment

No Medical Equipment data



Health Concerns Section

Health Concerns data not found



Goals Section

Goals data not found



Interventions Section

Interventions data not found



Health Status Evaluations/Outcomes Section

Health Status Evaluations/Outcomes data not found



Advance Directives

No Advance Directive data

## 2021-12-28 NOTE — XMS REPORT
CCD document using C-CDA

                             Created on: 2021



AyalaRosibel vegas

External Reference #: [id number]

: 1970

Sex: Female



Demographics





                          Address                   [address]

[place]  [address]

 

                          Home Phone                [phone]

 

                          Preferred Language        Unknown

 

                          Marital Status            Unknown

 

                          Christianity Affiliation     Unknown

 

                          Race                      White

 

                          Ethnic Group              Not  or 





Author





                          Author                    Rosibel Crump

 

                          Organization              [facility]

 

                          Address                   [address] [place]  [address]



 

                          Phone                     [phone]







Care Team Providers





                    Care Team Member Name Role                Phone

 

                    Yesica Crump     PP                  Unavailable

 

                          CCM                       Unavailable







Summary Purpose

Interface Exchange



Insurance Providers





             Payer name   Policy type / Coverage type Covered party ID Effective

 Begin Date 

Effective End Date

 

             [facility] [employer] [id number] 

Unknown      

Unknown







Family history





Mother



                          Diagnosis                 Age At Onset

 

                          Diabetes mellitus Type 2  Unknown

 

                          Alcoholism                Unknown

 

                          Depression                Unknown







Father



                          Diagnosis                 Age At Onset

 

                          Diabetes mellitus Type 2  Unknown







Social History





                Social History Element Codes           Description     Effective

 Dates

 

                    Marital status      Unknown             

Ha                                   08/15/2019

 

                Number of children Unknown         2               08/15/2019

 

                    Employment          Unknown             Currently employed

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

 

                Tobacco history SNOMED CT: 11983133 Current some days smoker 08/

15/2019

 

                Alcohol history SNOMED CT: 533793541 Never drinks alcohol 08/15/

2019







Allergies, Adverse Reactions, Alerts





           Substance  Reaction   Codes      Entered Date Inactivated Date Status

 

           Penicillin            Unknown    08/15/2019 No Inactive Date Active







Problems





                Condition       Codes           Effective Dates Condition Status

 

                          Acute bronchitis          ICD-10: J20.9

ICD-9: 466.0              2019                Active

 

                          Cough                     ICD-10: R05.9

ICD-9: 786.2              10/25/2021                Active

 

                          Encounter for screening mammogram for malignant neopla

sm of breast ICD-10: 

Z12.31

ICD-9: V76.12             11/10/2020                Active

 

                          Generalized anxiety disorder ICD-10: F41.9

ICD-9: 300.00             10/25/2021                Active

 

                          Other allergic rhinitis   ICD-10: J30.89

ICD-9: 477.8              2020                Active

 

                          Menopausal and female climacteric states ICD-10: N95.1

ICD-9: 627.2              08/15/2019                Active

 

                          Encounter for general adult medical examination withou

t abnormal findings ICD-

10: Z00.00

ICD-9: V70.0              08/15/2019                Active

 

                          Screening, lipid          ICD-10: Z13.220

ICD-9: V77.91             10/22/2020                Active

 

                          Migraine without status migrainosus, not intractable, 

unspecified migraine type 

ICD-10: G43.909

ICD-9: 346.90             2020                Active

 

                          Headache                  ICD-10: R51

ICD-9: 784.0              2020                Active

 

                          Other acute sinusitis     ICD-10: J01.80

ICD-9: 461.8              2020                Active

 

                          Generalized anxiety disorder ICD-10: F41.1

ICD-9: 300.00             08/15/2019                Active

 

                                        Encounter for gynecological examination 

(general) (routine) without abnormal 

findings                                ICD-10: Z01.419

ICD-9: V72.31             2020                Active

 

                          Cough                     ICD-10: R05

ICD-9: 786.2              2019                Active

 

                          Body aches                ICD-10: R52

ICD-9: 780.96             2019                Active

 

                          Fever                     ICD-10: R50.9

ICD-9: 780.60             2019                Active

 

                          Pain in left ankle and joints of left foot ICD-10: M25

.572

ICD-9: 719.47             2019                Active

 

                          Pain in left knee         ICD-10: M25.562

ICD-9: 719.46             2019                Active







Medications





          Medication Codes     Instructions Start Date Stop Date Status    Fill 

Instructions

 

                prednisone 20 mg tablet RxNorm: 206141  Take 2 Tablet(s) Oral ev

estevan day 

2021          2021          Active               

 

                    azithromycin 250 mg tablet RxNorm: 393841      Take 1 Tablet

(s) Oral every day take 2

tablets day 1, then take 1 tablet daily on days 2-5 2021

21          Active

                                        Please disregard Cefdinir RX.

 

                cefdinir 300 mg capsule RxNorm: 718837  Take 1 Capsule(s) Oral t

wo times a day 

2021          Inactive             

 

                    azithromycin 250 mg tablet RxNorm: 844689      Take 1 Tablet

(s) Oral every day take 2

tablets day 1, then take 1 tablet daily on days 2-5 2021

21          

Inactive                                Please disregard Cefdinir RX.

 

                    promethazine 6.25 mg-codeine 10 mg/5 mL syrup RxNorm: 911830

      Take 5 

Milliliter(s) Oral every 4-6 hours as needed cough 10/25/2021          10/25/202

1          

Inactive                                 

 

                prednisone 20 mg tablet RxNorm: 803777  Take 2 Tablet(s) Oral ev

estevan day 

10/25/2021          10/29/2021          Inactive             

 

                    promethazine 6.25 mg-codeine 10 mg/5 mL syrup RxNorm: 807335

      Take 5 

Milliliter(s) Oral every 4-6 hours as needed cough 10/25/2021          11/03/202

1          

Inactive                                 

 

                    Kenalog 40 mg/mL suspension for injection RxNorm: 4919289   

  Take 1 Milliliter(s) 

Injection       10/25/2021      10/25/2021      Inactive         

 

                    alprazolam 0.5 mg tablet RxNorm: 730635      Take 1 Tablet(s

) Oral two times a day as

needed FOR ANXIETY 10/15/2021      2021      Active           

 

                    alprazolam 0.5 mg tablet RxNorm: 954295      1 Tablet(s) Ora

l two times a day as 

needed anxiety  2021      Inactive         

 

                    Depo-Estradiol 5 mg/mL intramuscular oil RxNorm: 841599     

 0.75 Milliliter(s) 

Intramuscular   2021      Inactive         

 

                    alprazolam 0.5 mg tablet RxNorm: 457249      1 Tablet(s) Ora

l two times a day as 

needed anxiety  2021      Inactive         

 

                    Depo-Estradiol 5 mg/mL intramuscular oil RxNorm: 533544     

 0.75 Milliliter(s) 

Intramuscular   2021      Inactive         

 

                    alprazolam 0.5 mg tablet RxNorm: 589996      1 Tablet(s) Ora

l two times a day as 

needed anxiety  2021      Inactive         

 

                    alprazolam 0.5 mg tablet RxNorm: 729160      1 Tablet(s) Ora

l two times a day as 

needed anxiety  04/15/2021      2021      Inactive         

 

                    Depo-Provera 150 mg/mL intramuscular suspension RxNorm: 1000

128     Milliliter(s) 

Intramuscular   20211      Inactive         

 

                    escitalopram 10 mg tablet RxNorm: 283472      TAKE ONE TABLE

T BY MOUTH EVERY NIGHT AT

BEDTIME Tablet(s) Oral 2021      Inactive         

 

                    alprazolam 0.5 mg tablet RxNorm: 013735      1 Tablet(s) Ora

l two times a day as 

needed anxiety  2021      Inactive         

 

                    escitalopram 5 mg tablet RxNorm: 141006      TAKE ONE TABLET

 BY MOUTH EVERY NIGHT AT 

BEDTIME         02/15/2021      2021      Inactive         

 

                    alprazolam 0.5 mg tablet RxNorm: 708883      1 Tablet(s) Ora

l two times a day as 

needed anxiety  02/15/2021      2021      Inactive         

 

                atorvastatin 20 mg tablet RxNorm: 696161  TAKE ONE TABLET BY SHELLY

TH DAILY 

2021          No Stop Date        Active               

 

                    Depo-Estradiol 5 mg/mL intramuscular oil RxNorm: 232431     

 3/4 Milliliter(s) 

Intramuscular monthly 2021      Inactive         

 

                    Depo-Provera 150 mg/mL intramuscular suspension RxNorm: 1000

128     Milliliter(s) 

Intramuscular   2021      Inactive         

 

                    alprazolam 0.5 mg tablet RxNorm: 729011      1 Tablet(s) Ora

l two times a day as 

needed anxiety  2020      Inactive         

 

                    acyclovir 400 mg tablet RxNorm: 741514      TAKE ONE TABLET 

BY MOUTH FOUR TIMES A DAY

                2020      Inactive         

 

                    alprazolam 0.5 mg tablet RxNorm: 434324      1 Tablet(s) Ora

l two times a day as 

needed anxiety  2020      12/15/2020      Inactive         

 

             atorvastatin 20 mg tablet RxNorm: 422020 1 Tablet(s) Oral every day

 2020                Inactive                   

 

             atorvastatin 20 mg tablet RxNorm: 019405 1 Tablet(s) Oral every day

 2020   

02/10/2021                Inactive                   

 

                    alprazolam 0.5 mg tablet RxNorm: 549939      1 Tablet(s) Ora

l two times a day as 

needed anxiety  10/23/2020      11/15/2020      Inactive         

 

                    Depo-Provera 150 mg/mL intramuscular suspension RxNorm: 1000

128     Milliliter(s) 

Intramuscular   10/20/2020      10/20/2020      Inactive         

 

                    acyclovir 400 mg tablet RxNorm: 760456      TAKE ONE TABLET 

BY MOUTH FOUR TIMES A DAY

                10/06/2020      11/15/2020      Inactive         

 

                    alprazolam 0.5 mg tablet RxNorm: 373711      1 Tablet(s) Ora

l two times a day as 

needed anxiety  2020      10/21/2020      Inactive         

 

                    alprazolam 0.5 mg tablet RxNorm: 878093      1 Tablet(s) Ora

l two times a day as 

needed anxiety  2020      Inactive         

 

                escitalopram 5 mg tablet RxNorm: 281600  1 Tablet(s) Oral every 

night at bedtime 

2020          Inactive             

 

             prednisone 20 mg tablet RxNorm: 715447 2 Tablet(s) Oral every day 0

2020                Inactive                   

 

                    Depo-Provera 150 mg/mL intramuscular suspension RxNorm: 1000

128     0.75 

Milliliter(s) Intramuscular 2020      Inactive         

 

                    Kenalog 40 mg/mL suspension for injection RxNorm: 0036677   

  1 Milliliter(s) 

Injection       2020      Inactive         

 

                Zithromax Z-Timur 250 mg tablet RxNorm: 823730  Tablet(s) Oral as 

directed 

2020          Inactive             

 

                    alprazolam 0.5 mg tablet RxNorm: 366289      1 Tablet(s) Ora

l two times a day as 

needed anxiety  2020      Inactive         

 

                    alprazolam 0.5 mg tablet RxNorm: 880186      1 Tablet(s) Ora

l two times a day as 

needed anxiety  2020      Inactive         

 

                    Depo-Provera 150 mg/mL intramuscular suspension RxNorm: 1000

128     Milliliter(s) 

Intramuscular   2020      Inactive         

 

                    acyclovir 400 mg tablet RxNorm: 572246      1 Tablet(s) Oral

 four times a day fever 

blisters        2020      Inactive         

 

                    Depo-Provera 150 mg/mL intramuscular suspension RxNorm: 1000

128     Milliliter(s) 

Intramuscular   2020      Inactive         

 

                pravastatin 10 mg tablet RxNorm: 697118  1 Tablet(s) Oral every 

night at bedtime 

2020          Inactive             

 

                pravastatin 10 mg tablet RxNorm: 693126  1 Tablet(s) Oral every 

night at bedtime 

2020          Inactive             

 

                    Depo-Estradiol 5 mg/mL intramuscular oil RxNorm: 214472     

 3/4 Milliliter(s) 

Intramuscular monthly 2020      Inactive         

 

                    Kenalog 40 mg/mL suspension for injection RxNorm: 3400466   

  Milliliter(s) 

Injection       2019      Inactive         

 

                    Phenergan with Codeine Syrup RxNorm:             5-10 Millil

iter(s) Oral Every 6 hrs as 

needed          2019      Inactive         

 

             prednisone 20 mg tablet RxNorm: 950787 2 Tablet(s) Oral every day 1

2019                Inactive                  start tomorrow

 

                    doxycycline hyclate 100 mg tablet RxNorm: 1712288     1 Tabl

et(s) Oral two times a 

day             2019      Inactive        start if symptom

s do not improve

 

                    Depo-Estradiol 5 mg/mL intramuscular oil RxNorm: 341209     

 3/4 Milliliter(s) 

Intramuscular monthly 2019      Inactive         

 

                    Depo-Estradiol 5 mg/mL intramuscular oil RxNorm: 697607     

 3/4 Milliliter(s) 

Intramuscular monthly 10/31/2019      2019      Inactive         

 

                    gabapentin 100 mg capsule RxNorm: 307645      1 Capsule(s) O

ral every night at 

bedtime         10/16/2019      2019      Inactive         

 

             gabapentin 100 mg capsule RxNorm: 141302 1 Capsule(s) PO QHS 09/12/

2019   

10/11/2019                Inactive                   

 

           naproxen 500 mg tablet RxNorm: 113166 1 Tablet(s) PO BID 2019 0

2019 

Inactive                                 

 

             escitalopram 5 mg tablet RxNorm: 474473 1 Tablet(s) PO QHS 08/15/20

19   2019

                          Inactive                   

 

             ranitidine 150 mg capsule RxNorm: 707820 1 Capsule(s) PO BID 2019                Inactive                   

 

             eszopiclone 1 mg tablet RxNorm: 632951 Tablet(s) PO as needed 2020                Inactive                   

 

                acyclovir 400 mg tablet RxNorm: 405897  1 Tablet(s) PO PRN fever

 blisters 

2020          Inactive             

 

             clonidine HCl 0.1 mg tablet RxNorm: 257719 Tablet(s) PO as needed 0

8/15/2019   

2019                Inactive                   







Medication Administered





             Medication   Codes        Instructions Start Date   Status

 

                Kenalog 40 mg/mL suspension for injection RxNorm: 0378212 1Milli

liter     10/25/2021

                                        No longer Active

 

                Depo-Estradiol 5 mg/mL intramuscular oil RxNorm: 071210  0.75Mil

liliter  

2021                              No longer Active

 

                Depo-Estradiol 5 mg/mL intramuscular oil RxNorm: 908616  0.75Mil

liliter  

2021                              No longer Active

 

                Depo-Provera 150 mg/mL intramuscular suspension RxNorm: 3109624 

Milliliter      

2021                              No longer Active

 

                Depo-Provera 150 mg/mL intramuscular suspension RxNorm: 6138915 

Milliliter      

2021                              No longer Active

 

                Depo-Provera 150 mg/mL intramuscular suspension RxNorm: 4300911 

Milliliter      

10/20/2020                              No longer Active

 

                Depo-Provera 150 mg/mL intramuscular suspension RxNorm: 5222861 

0.75Milliliter  

2020                              No longer Active

 

                Kenalog 40 mg/mL suspension for injection RxNorm: 6262155 1Milli

liter     2020

                                        No longer Active

 

                Depo-Provera 150 mg/mL intramuscular suspension RxNorm: 8722650 

Milliliter      

2020                              No longer Active

 

                Depo-Provera 150 mg/mL intramuscular suspension RxNorm: 4975480 

Milliliter      

2020                              No longer Active

 

                Depo-Estradiol 5 mg/mL intramuscular oil RxNorm: 841356  3/4Mill

ilitermonthly 

2020                              Active

 

             Kenalog 40 mg/mL suspension for injection RxNorm: 7572893 Millilite

r   2019   

No longer Active

 

                Depo-Estradiol 5 mg/mL intramuscular oil RxNorm: 631079  3/4Mill

ilitermonthly 

2019                              Active







Immunizations

No Immunization data



Results





          Observation Observation Code Item      Item Code Result    Date      S

ervice Location

 

          Lipid     Ord30     CHOL                249 mg/dL 10/22/2020 Unknown

 

          Lipid     Ord30     HDL                 55.0 mg/dl 10/22/2020 Unknown

 

          Lipid     Ord30     TRIG                82 mg/dL  10/22/2020 Unknown

 

          Lipid     Ord30     LDL                 178 mg/dL 10/22/2020 Unknown

 

          Lipid     Ord30     C/HDL               4.5 Ratio 10/22/2020 Unknown

 

          Comp Metabolic Lyf965    NA                  138 mEq/L 10/22/2020 [facility]

own

 

          Comp Metabolic Ivm279    K                   4.5 mEq/L 10/22/2020 [facility]

own

 

          Comp Metabolic Zoc742    CL                  103 mEq/L 10/22/2020 [facility]

own

 

          Comp Metabolic Rne553    CO2                 28.0 mEq/L 10/22/2020 [facility]

nown

 

          Comp Metabolic Cil429    ANION GAP           12        10/22/2020 [facility]

own

 

          Comp Metabolic Iav843    GLUCOSE             90 mg/dL  10/22/2020 [facility]

own

 

          Comp Metabolic Zor447    Creat               0.7 mg/dL 10/22/2020 [facility]

own

 

          Comp Metabolic Ngp832    eGFR                88 ml/min/1.73m2 10/22/20

20 Unknown

 

          Comp Metabolic Rpk649    BUN                 20 mg/dL  10/22/2020 [facility]

own

 

          Comp Metabolic Rvf164    B/C Ratio           27.0 Ratio 10/22/2020 [facility]

nown

 

          Comp Metabolic Yeh241    CALCIUM             9.5 mg/dL 10/22/2020 [facility]

own

 

          Comp Metabolic Lpn899    ALK PHOS            87 U/L    10/22/2020 [facility]

own

 

          Comp Metabolic Ajj930    AST(SGOT)           17 U/L    10/22/2020 [facility]

own

 

          Comp Metabolic Dxy817    ALT(SGPT)           20 U/L    10/22/2020 [facility]

own

 

          Comp Metabolic Mdg746    BILI T              0.6 mg/dL 10/22/2020 [facility]

own

 

          Comp Metabolic Wwr054    ALBUMIN             4.5 g/dL  10/22/2020 [facility]

own

 

          Comp Metabolic Ayp073    TPRO                7.6 g/dL  10/22/2020 [facility]

own

 

          Comp Metabolic Eem810    GLOB                3.1 g/dL  10/22/2020 [facility]

own

 

          Comp Metabolic Lin519    A/G Ratio           1.5 Ratio 10/22/2020 [facility]

own

 

          Comp Metabolic Mui902    Osmo                278 mOsmo 10/22/2020 [facility]

own

 

          Cbc With Differential Ord2      WBC                 10.56 K/ul 10/22/2

020 Unknown

 

          Cbc With Differential Ord2      RBC                 4.44 M/ul 10/22/20

20 Unknown

 

          Cbc With Differential Ord2      HGB                 14.0 g/dl 10/22/20

20 Unknown

 

          Cbc With Differential Ord2      HCT                 42.8 %    10/22/20

20 Unknown

 

          Cbc With Differential Ord2      Neut%               73.9 %    10/22/20

20 Unknown

 

          Cbc With Differential Ord2      MCV                 96.4 fl   10/22/20

20 Unknown

 

          Cbc With Differential Ord2      Lymph%              18.2 %    10/22/20

20 Unknown

 

          Cbc With Differential Ord2      MCH                 31.5 pg   10/22/20

20 Unknown

 

          Cbc With Differential Ord2      Mono%               5.4 %     10/22/20

20 Unknown

 

          Cbc With Differential Ord2      Eos%                2.2 %     10/22/20

20 Unknown

 

          Cbc With Differential Ord2      MCHC                32.7 pg   10/22/20

20 Unknown

 

          Cbc With Differential Ord2      PLT                 258 K/ul  10/22/20

20 Unknown

 

          Cbc With Differential Ord2      Baso%               0.3 %     10/22/20

20 Unknown

 

          Cbc With Differential Ord2      Neut ABS#           7.81 K/ul 10/22/20

20 Unknown

 

          Cbc With Differential Ord2      RDW                 13.4 %    10/22/20

20 Unknown

 

          Cbc With Differential Ord2      Lymph ABS#           1.92 K/ul 10/22/2

020 Unknown

 

          Cbc With Differential Ord2      Mono ABS#           0.6 K/ul  10/22/20

20 Unknown

 

          Cbc With Differential Ord2      Eos ABS#            0.2 K/ul  10/22/20

20 Unknown

 

          Cbc With Differential Ord2      Baso ABS#           0.0 K/ul  10/22/20

20 Unknown

 

          Tsh       Ord6      TSH (3rd IS)           1.38 uIU/mL 2020 [facility]

own

 

          Comp Metabolic Loz483    NA                  141 mEq/L 2020 [facility]

own

 

          Comp Metabolic Kfz655    K                   4.3 mEq/L 2020 [facility]

own

 

          Comp Metabolic Rxd260    CL                  104 mEq/L 2020 [facility]

own

 

          Comp Metabolic Bhr086    CO2                 29.0 mEq/L 2020 [facility]

nown

 

          Comp Metabolic Gxy787    ANION GAP           12        2020 [facility]

own

 

          Comp Metabolic Nvz483    GLUCOSE             83 mg/dL  2020 [facility]

own

 

          Comp Metabolic Dde144    Creat               0.7 mg/dL 2020 [facility]

own

 

          Comp Metabolic Siu244    eGFR                102 ml/min/1.73m2 

020 Unknown

 

          Comp Metabolic Plw325    BUN                 16 mg/dL  2020 [facility]

own

 

          Comp Metabolic Vri202    B/C Ratio           24.6 Ratio 2020 [facility]

nown

 

          Comp Metabolic Dyf392    CALCIUM             9.3 mg/dL 2020 [facility]

own

 

          Comp Metabolic Vnp453    ALK PHOS            79 U/L    2020 [facility]

own

 

          Comp Metabolic Qmx870    AST(SGOT)           19 U/L    2020 [facility]

own

 

          Comp Metabolic Gmo146    ALT(SGPT)           26 U/L    2020 [facility]

own

 

          Comp Metabolic Pcg972    BILI T              0.5 mg/dL 2020 [facility]

own

 

          Comp Metabolic Bym538    ALBUMIN             4.2 g/dL  2020 [facility]

own

 

          Comp Metabolic Nlr772    TPRO                7.0 g/dL  2020 [facility]

own

 

          Comp Metabolic Vwe065    GLOB                2.8 g/dL  2020 [facility]

own

 

          Comp Metabolic Itc014    A/G Ratio           1.5 Ratio 2020 [facility]

own

 

          Comp Metabolic Tkz560    Osmo                282 mOsmo 2020 [facility]

own

 

          Cbc With Differential Ord2      WBC                 8.94 K/ul 20

20 Unknown

 

          Cbc With Differential Ord2      RBC                 4.34 M/ul 20

20 Unknown

 

          Cbc With Differential Ord2      HGB                 13.3 g/dl 20

20 Unknown

 

          Cbc With Differential Ord2      HCT                 40.7 %    20

20 Unknown

 

          Cbc With Differential Ord2      Neut%               71.8 %    20

20 Unknown

 

          Cbc With Differential Ord2      MCV                 93.8 fl   20

20 Unknown

 

          Cbc With Differential Ord2      Lymph%              19.5 %    20

20 Unknown

 

          Cbc With Differential Ord2      MCH                 30.6 pg   20

20 Unknown

 

          Cbc With Differential Ord2      Mono%               6.2 %     20

20 Unknown

 

          Cbc With Differential Ord2      Eos%                2.2 %     20

20 Unknown

 

          Cbc With Differential Ord2      MCHC                32.7 pg   20

20 Unknown

 

          Cbc With Differential Ord2      Baso%               0.3 %     20

20 Unknown

 

          Cbc With Differential Ord2      PLT                 262 K/ul  20

20 Unknown

 

          Cbc With Differential Ord2      RDW                 13.4 %    20

20 Unknown

 

          Cbc With Differential Ord2      Neut ABS#           6.42 K/ul 20

20 Unknown

 

          Cbc With Differential Ord2      Lymph ABS#           1.74 K/ul 

020 Unknown

 

          Cbc With Differential Ord2      Mono ABS#           0.6 K/ul  20

20 Unknown

 

          Cbc With Differential Ord2      Eos ABS#            0.2 K/ul  20

20 Unknown

 

          Cbc With Differential Ord2      Baso ABS#           0.0 K/ul  20

20 Unknown

 

          Lipid     Ord30     CHOL                240 mg/dL 2020 Unknown

 

          Lipid     Ord30     HDL                 57.0 mg/dl 2020 Unknown

 

          Lipid     Ord30     TRIG                80 mg/dL  2020 Unknown

 

          Lipid     Ord30     LDL                 167 mg/dL 2020 Unknown

 

          Lipid     Ord30     C/HDL               4.2 Ratio 2020 Unknown

 

          C A/B FLU [id number]   Influenza A Scr           Negative  2019 Unk

nown

 

          C A/B FLU [id number]   Influenza B Scr           Negative  2019 Unk

nown

 

           C A/B FLU  [id number]    Influenza Intrp B AG:PRID:PT:NOSE:NOM:IF       

     See Footnote  

2019                              Unknown







Procedures





                    Procedure           Codes               Date

 

                    THER/PROPH/DIAG INJ SC/IM CPT-4: 25434        2021

 

                    THER/PROPH/DIAG INJ SC/IM CPT-4: 11672        2021

 

                    THER/PROPH/DIAG INJ SC/IM CPT-4: 83159        2021

 

                    THER/PROPH/DIAG INJ SC/IM CPT-4: 70936        2021

 

                    THER/PROPH/DIAG INJ SC/IM CPT-4: 48292        10/20/2020

 

                    THER/PROPH/DIAG INJ SC/IM CPT-4: 86491        2020

 

                    TRIAMCINOLONE ACET INJ NOS 10 mg CPT-4:         

020

 

                    THER/PROPH/DIAG INJ SC/IM CPT-4: 50959        2020

 

                    THER/PROPH/DIAG INJ SC/IM CPT-4: 85507        2020

 

                    THER/PROPH/DIAG INJ SC/IM CPT-4: 67080        2020

 

                    TRIAMCINOLONE ACET INJ NOS 10 mg CPT-4:         

019

 

                    THER/PROPH/DIAG INJ SC/IM CPT-4: 41601        2019







Vital Signs





                          Date                      Vital

 

                10/25/2021      Blood Pressure 1: 142/68 Code: 8480-6 BMI: 30.7 

Code: 01568-0 Heart 

Rate 1: 63 bpm      Height: 5'2" Code: 8302-2 SpO2: 97%           Temperature: 3

6.4 (C) / 97.6 

(F)                                     Weight: 168 lbs  Code: 64216-0

 

                2020      Blood Pressure 1: 130/80 Code: 8480-6 BMI: 28.5 

Code: 98232-7 Heart 

Rate 1: 69 bpm      Height: 5'2" Code: 8302-2 SpO2: 97%           Temperature: 3

6.9 (C) / 98.4 

(F)                                     Weight: 156 lbs  Code: 65865-0

 

                2020      Blood Pressure 1: 128/78 Code: 8480-6 Heart Rate

 1: 74 bpm Height:  

Code: 8302-2              SpO2: 98%                 Weight:  Code: 31927-5

 

                    2020          Height:  Code: 8302-2 Weight:  Code: 294

63-7

 

                2020      Blood Pressure 1: 132/80 Code: 8480-6 BMI: 28.9 

Code: 39248-7 Heart 

Rate 1: 68 bpm      Height: 5'2" Code: 8302-2 SpO2: 97%           Weight: 158 lb

s  Code: 

98470-3

 

                2019      Blood Pressure 1: 110/60 Code: 8480-6 BMI: 28.5 

Code: 14658-5 Heart 

Rate 1: 76 bpm      Height: 5'2" Code: 8302-2 SpO2: 98%           Temperature: 3

6.9 (C) / 98.5 

(F)                                     Weight: 156 lbs  Code: 90598-9

 

             2019   Blood Pressure 1: 124/80 Code: 8480-6 Heart Rate 1: 81

 bpm SpO2: 98%    

Temperature: 36.7 (C) / 98.1 (F)

 

                2019      Blood Pressure 1: 130/78 Code: 8480-6 BMI: 28.0 

Code: 13632-8 Heart 

Rate 1: 68 bpm      Height: 5'2" Code: 8302-2 SpO2: 98%           Weight: 153 lb

s  Code: 

13421-0

 

                2019      Heart Rate 1: 68 bpm Height:  Code: 8302-2 Weigh

t:  Code: 14348-9

 

                2019      Blood Pressure 1: 132/74 Code: 8480-6 BMI: 27.4 

Code: 52117-1 Heart 

Rate 1: 66 bpm      Height: 5'2" Code: 8302-2 SpO2: 98%           Weight: 150 lb

s  Code: 

68178-3

 

                08/15/2019      Blood Pressure 1: 104/60 Code: 8480-6 BMI: 27.5 

Code: 45936-4 Heart 

Rate 1: 66 bpm      Height: 5'2" Code: 8302-2 SpO2: 98%           Weight: 150 lb

s 5 oz Code: 

26136-7







Functional Status

No Functional Status data



Reason For Visit





                    Reason For Visit    Effective Dates     Notes

 

                    sinus congestion    10/25/2021           

 

                    headache            2020           

 

                    headache            2020           

 

                    well woman exam (40-65 years) 2020           

 

                    cough               2019           

 

                    fever               2019           

 

                    menopausal symptoms 2019           

 

                    lower leg pain      2019           

 

                    lower leg pain      2019           

 

                    anxiety             08/15/2019           







Encounters





             Encounter    Performer    Location     Codes        Date

 

                                        () 92617 EST. PATIENT, LEVEL IV

Diagnosis: Generalized anxiety disorder[ICD10: F41.9]

Diagnosis: Cough[ICD10: R05.9]

Diagnosis: Other allergic rhinitis[ICD10: J30.89]

Diagnosis: Encounter for screening mammogram for malignant neoplasm of 
breast[ICD10: Z12.31] Eulalia Crump MD, [facility] CPT-4: 90185    

10/25/2021

 

                                        [phone]) 52600 EST. PATIENT, LEVEL III

Diagnosis: Headache[ICD10: R51]

Diagnosis: Other allergic rhinitis[ICD10: J30.89] Bree Gutierres MD, [facility]          CPT-4: 66852              2020

 

                                        37852 EST. PATIENT, LEVEL III

Diagnosis: Other acute sinusitis[ICD10: J01.80]

Diagnosis: Other allergic rhinitis[ICD10: J30.89]

Diagnosis: Migraine without status migrainosus, not intractable, unspecified 
migraine type[ICD10: G43.909]

Diagnosis: Menopausal and female climacteric states[ICD10: N95.1] [facility]    CPT-4: 59779              2020

 

                                        [phone]) 12405 EST. PATIENT, LEVEL III

Diagnosis: Generalized anxiety disorder[ICD10: F41.1] [facility]          CPT-4: 63391              2020

 

                                        (16501) PREV VISIT EST AGE 40-64

Diagnosis: Encounter for gynecological examination (general) (routine) without 
abnormal findings[ICD10: Z01.419] Bree Crump MD, [facility] CPT

-4:

91463                                   2020

 

                                        [phone]) 70107 EST. PATIENT, LEVEL III

Diagnosis: Cough[ICD10: R05]

Diagnosis: Acute bronchitis[ICD10: J20.9] Bree carrasco MD, 

[facility]                       CPT-4: 38398              2019

 

                                        [phone]) 99713 EST. PATIENT, LEVEL III

Diagnosis: Fever[ICD10: R50.9]

Diagnosis: Body aches[ICD10: R52] Bree Crump MD, [facility] CPT

-4:

43476                                   2019

 

                                        [phone]) 79066 EST. PATIENT, LEVEL III

Diagnosis: Menopausal and female climacteric states[ICD10: N95.1] [facility] CPT-4: 86475        2019

 

                                        95669 EST. PATIENT, LEVEL III

Diagnosis: Pain in left ankle and joints of left foot[ICD10: M25.572]

Diagnosis: Pain in left knee[ICD10: M25.562] [facility]

                          CPT-4: 75113              2019

 

                                        49348 EST. PATIENT, LEVEL III

Diagnosis: Pain in left ankle and joints of left foot[ICD10: M25.572]

Diagnosis: Pain in left knee[ICD10: M25.562] [facility]

                          CPT-4: 91792              2019

 

                                        (79172) PREV VISIT NEW AGE 40-64

Diagnosis: Encounter for general adult medical examination without abnormal 
findings[ICD10: Z00.00] [facility] CPT-4: 39029    

08/15/2019







Plan of Care





             Planned Activity Notes        Codes        Status       Date

 

             Patient Education: Patient Medication Summary                      

     Completed    2021

 

                          Visit Plan:               Allergies exacerbation with 

cough - chronic - recommended pt to use

allergy medication as prescribed. Pt has been counseled as to the appropriate 
use of the medication. Pt to call if allergy symptoms are not controlled with 
the medication. If using nasal spray, instructions as follows: Nasal spray- use 
twice daily, one spray per nostril twice daily, after 30 minutes, rinse out nose
with saline spray.. Use opposite hand per nostril to spray in the nasal steroid 
allergy spray. Kenalog injection given at visit. Prednisone rx sent. Phenergan 
with codeine cough medicine sent. Screening mammogram - orders sent. Anxiety - 
controlled. Continue prn alprazolam at bedtime and escitalopram. Fasting lab 
orders sent

                                                            10/25/2021

 

                                        Appointment: Eulalia Osman 

WPtel:[phone]

                                        [facility]

                                              (30 min) Complex 10/25/2021

 

             Patient Education: Patient Medication Summary                      

     Completed    10/25/2021

 

             Patient Education: prednisone- [employer] [id number]         

                  Completed    

10/25/2021

 

             Care Plan: Lipid                           Pending      10/25/2021

 

             Care Plan: Cbc With Differential                           Pending 

     10/25/2021

 

             Care Plan: Comp Metabolic                           Pending      10

/

 

             Care Plan: Tsh                           Pending      10/25/2021

 

             Appointment:                            Injection    2021

 

             Patient Education: Patient Medication Summary                      

     Completed    2021

 

             Appointment:                            Injection    2021

 

             Patient Education: Patient Medication Summary                      

     Completed    2021

 

             Appointment:                            Injection    2021

 

             Appointment:                            Injection    2021

 

             Patient Education: Patient Medication Summary                      

     Completed    2021

 

             Appointment:                            Injection    2021

 

             Patient Education: Patient Medication Summary                      

     Completed    2021

 

             Patient Education: Patient Medication Summary                      

     Completed    11/10/2020

 

             Patient Education: Patient Medication Summary                      

     Completed    10/22/2020

 

             Appointment:                            Injection    10/20/2020

 

             Patient Education: Patient Medication Summary                      

     Completed    10/20/2020

 

                          Visit Plan:               Allergies - chronic - recomm

ended pt to use allergy medication as 

prescribed. Pt has been counseled as to the appropriate use of the medication. 
Pt to call if allergy symptoms are not controlled with the medication. If using 
nasal spray, instructions as follows: Nasal spray- use twice daily, one spray 
per nostril twice daily, after 30 minutes, rinse out nose with saline spray.. 
Use opposite hand per nostril to spray in the nasal steroid allergy spray. 
Headache -will treat allergies/congestion - instructed patient to call if does 
not improve and we will order CT Head without contrast.

                                                            2020

 

                                        Appointment: Bree Oleary 

WPtel:[phone]

                                        [facility]

                                              (15 min) Moderate 2020

 

             Patient Education: Patient Medication Summary                      

     Completed    2020

 

                          Visit Plan:               Sinusitis - Pt has acute inf

ection - pain in face, maxillary 

region, Pt informed to use decongestant, RX given to patient, sinus rinses also 
recommended. Call if symptoms do not show improvement. Allergies - chronic - 
recommended pt to use allergy medication as prescribed. Pt has been counseled as
to the appropriate use of the medication. Pt to call if allergy symptoms are not
controlled with the medication. If using nasal spray, instructions as follows: 
Nasal spray- use twice daily, one spray per nostril twice daily, after 30 
minutes, rinse out nose with saline spray.. Use opposite hand per nostril to 
spray in the nasal steroid allergy spray. Migraine - will treat for sinus 
infection - pt is to notify clinic if symptoms do not improve, if they worsen, 
or with any changes, questions, or concerns.

                                                            2020

 

                                        Appointment: Flakita Castro 

WPtel:[phone]

                                        [facility]

                                              (30 min) Complex 2020

 

             Patient Education: Patient Medication Summary                      

     Completed    2020

 

                          Visit Plan:               Anxiety - the patient has un

controlled anxiety and will benefit 

from a short term dose of xanax for control of symptoms.

                                                            2020

 

                                        Appointment: Yesica Crump 

WPtel:[phone]

                                        [facility]

                                              TeleHealth      2020

 

             Patient Education: Patient Medication Summary                      

     Completed    2020

 

             Appointment:                            Injection    2020

 

             Patient Education: Patient Medication Summary                      

     Completed    2020

 

             Appointment:                            Injection    2020

 

             Appointment:                            Injection    2020

 

             Patient Education: Patient Medication Summary                      

     Completed    2020

 

                                        Appointment: Yesica Crump 

WPtel:[phone] [address] [facility]

                                              Well Woman      03/10/2020

 

                          Visit Plan:               Well Adult Female - exam com

pleted. Pap and breast exam completed. 

Pt will be called with results of her testing. She was advised to continue with 
yearly annual exams. Safe sex practices discussed during office visit today. 
Call if any abnormal gynecologic issues during the next year, otherwise, RTC 
yearly or prn.

                                                            2020

 

                                        Appointment: Bree Oleary 

WPtel:[phone]

                                        [facility]

                                              Well Woman      2020

 

             Patient Education: Patient Medication Summary                      

     Completed    2020

 

                                        Appointment: Yesica Crump 

WPtel:[phone]

                                        [facility]

                                              (15 min) Moderate 2020

 

             Appointment:                            Injection    2020

 

             Patient Education: Patient Medication Summary                      

     Completed    2020

 

                          Visit Plan:               Bronchitis - acute case of b

ronchitis identified. Pt has been given

antibiotics, breathing treatments as appropriate, and pt has been instructed to 
call if symptoms are not improved, or if symptoms acutely worsen.

                                                            2019

 

                                        Appointment: Bree Oleary 

WPtel:[phone]

                                        [address] [place], [place] [address]

                                              (15 min) Moderate 2019

 

             Patient Education: Patient Medication Summary                      

     Completed    2019

 

                          Visit Plan:               URI -viral -flu swab to r/o 

flu - Pt advised to increase fluids, 

vitamin C. Discussed natural and expected course of this diagnosis and need to 
alert me if symptoms do not follow expected course, or if any worse.

                                                            2019

 

                                        Appointment: Bree Oleary 

WPtel:[phone]

                                        [facility]

                                              (30 min) Complex 2019

 

             Patient Education: Patient Medication Summary                      

     Completed    2019

 

                          Visit Plan:               Post-surgical menopause - co

ntinue to taper depo dose and 

eventually off medication completely -injection today in the office.

                                                            2019

 

                                        Appointment: Bree Oleary 

WPtel:[phone]

                                        [facility]

                                              (15 min) Moderate 2019

 

             Patient Education: Patient Medication Summary                      

     Completed    2019

 

                                        Appointment: Bree Oleary 

WPtel:[phone]

                                        [address] [place], [place] [address]

[place]                                              (30 min) Complex 2019

 

                          Visit Plan:               Left knee, ankle, foot pain 

- will send RX - will refer to ortho - 

The pt is to use prn antiinflammatories to manage acute pain. The patient is to 
call the office if the pain is worsening or does not improve.

                                                            2019

 

                                        Appointment: Flakita Castro 

WPtel:[phone]

                                        [address] [facility]

                                              (30 min) Complex 2019

 

             Patient Education: Patient Medication Summary                      

     Completed    2019

 

                    Care Plan: Referral Order                     SNOMED-CT : 30

[id number]

                          Pending                   2019

 

                          Visit Plan:               Left knee, ankle, foot pain 

- will send RX - if no improvement will

refer to ortho - The pt is to use prn antiinflammatories to manage acute pain. 
The patient is to call the office if the pain is worsening or does not improve.

                                                            2019

 

                                        Appointment: Flakita Castro 

WPtel:[phone]

                                        [address] [facility]

[place]                                              (30 min) Complex 2019

 

             Patient Education: Patient Medication Summary                      

     Completed    2019

 

                          Visit Plan:               Well Adult - pt was counsele

d about diet, exercise, and encouraged 

to follow a heart healthy diet and increase activity level. The patient was 
instructed to RTC yearly for well adult exams and PRN for acute illnesses. The 
pt was also instructed to have yearly labs for check of cholesterol, thyroid, 
chem panel, CBC, and renal functioning. Post-surgical menopause - I have advised
the pt that we will look for her medications from [facility] to find out what her depo 
dose is so I can wean her off of it. Anxiety - the patient has uncontrolled 
anxiety and will benefit from an SSRI on a daily basis to attempt control of the
symptoms of anxiety (tachycardia, overwhelming sensations, stress, insomnia, 
etc). stop clonidine due to hypotension

                                                            08/15/2019

 

             Patient Education: Patient Medication Summary                      

     Completed    08/15/2019

 

             Referral: Álvaro Sellers  Referral                  Appointment Co

nfirmed  

 

             Referral: Álvaro Sellers  Referral                  Completed     







Instructions





                                        Comment

 

                                        DUE FOR ANNUAL MAMMOGRAM- order faxed



DUE TO ANNUAL FASTING LABS- orders given at visit



CONTINUE XYZOL OR ZYRTEC



STEROID SHOT TODAY



PREDNISONE TABLETS TAKE 2 TABLETS DAILY X 5 DAYS



PHENERGAN W/CODEINE COUGH MEDICINE

                                        . Allergies exacerbation with cough - ch

[place] - recommended pt to use allergy 

medication as prescribed.  Pt has been counseled as  to the appropriate use of 
the medication.  Pt to call if allergy symptoms are not controlled with the 
medication.

If using nasal spray, instructions as follows: Nasal spray- use twice daily, one
spray per nostril twice daily, after 30 minutes, rinse out nose with saline 
spray.. Use opposite hand per nostril to spray in the nasal steroid allergy 
spray.  Kenalog injection given at visit. Prednisone rx sent. Phenergan with 
codeine cough medicine sent. 



Screening mammogram - orders sent.



Anxiety - controlled. Continue prn alprazolam at bedtime and escitalopram. 



Fasting lab orders sent







 

                                        . Allergies - chronic - recommended pt t

o use allergy medication as prescribed. 

Pt has been counseled as  to the appropriate use of the medication.  Pt to call 
if allergy symptoms are not controlled with the medication.

If using nasal spray, instructions as follows: Nasal spray- use twice daily, one
spray per nostril twice daily, after 30 minutes, rinse out nose with saline 
spray.. Use opposite hand per nostril to spray in the nasal steroid allergy 
spray.



Headache -will treat allergies/congestion - instructed patient to call if does 
not improve and we will order CT Head without contrast. 



 

                                        . Sinusitis - Pt has acute infection - p

ain in face, maxillary region, Pt 

informed to use decongestant, RX given to patient, sinus rinses also 
recommended.  Call if symptoms do not show improvement.



Allergies - chronic - recommended pt to use allergy medication as prescribed.  
Pt has been counseled as  to the appropriate use of the medication.  Pt to call 
if allergy symptoms are not controlled with the medication.

If using nasal spray, instructions as follows: Nasal spray- use twice daily, one
spray per nostril twice daily, after 30 minutes, rinse out nose with saline 
spray.. Use opposite hand per nostril to spray in the nasal steroid allergy 
spray.



Migraine - will treat for sinus infection - pt is to notify clinic if symptoms 
do not improve, if they worsen, or with any changes, questions, or concerns. 

 

                                        . Anxiety - the patient has uncontrolled

 anxiety and will benefit from a short 

term dose of xanax for control of symptoms.

 

                                        . Well Adult Female - exam completed.  P

ap and  breast exam completed.  Pt will 

be called with results of her testing.  She was advised to continue with yearly 
annual exams.   Safe sex practices discussed during office visit today.  Call if
any abnormal gynecologic issues during the next year, otherwise, RTC yearly or 
prn.



 

                                        kenalog 

                                        . Bronchitis - acute case of bronchitis 

identified.  Pt has been given 

antibiotics, breathing treatments as appropriate, and pt has been instructed to 
call if symptoms are not improved, or if symptoms acutely worsen.



 

                                        . URI -viral -flu swab to r/o flu - Pt a

dvised to increase fluids, vitamin C.  

Discussed natural and expected course of this diagnosis and need to alert me if 
symptoms do not follow expected course, or if any worse.



 

                                        . Post-surgical menopause - continue to 

taper depo dose and eventually off 

medication completely -injection today in the office. 



 

                                        tomorrow at 10:15 with the nurse praclouise benavides. Left knee, ankle, foot pain - 

will send RX - will refer to ortho - The pt is to use prn antiinflammatories to 
manage acute pain. The patient is to call the office if the pain is worsening or
does not improve. 





 

                                        . Left knee, ankle, foot pain - will sen

d RX - if no improvement will refer to 

ortho - The pt is to use prn antiinflammatories to manage acute pain. The 
patient is to call the office if the pain is worsening or does not improve. 



 

                                        . Well Adult - pt was counseled about di

et, exercise, and encouraged to follow a

heart healthy diet and increase activity level.  The patient was instructed to 
RTC yearly for well adult exams and PRN for acute illnesses.  The pt was also 
instructed to have yearly labs for check of cholesterol, thyroid, chem panel, 
CBC, and renal functioning.



Post-surgical menopause - I have advised the pt that we will look for her 
medications from [facility] to find out what her depo dose is so I can wean her off of 
it.



Anxiety - the patient has uncontrolled anxiety and will benefit from an SSRI on 
a daily basis to attempt control of the symptoms of anxiety (tachycardia, 
overwhelming sensations, stress, insomnia, etc). 

stop clonidine due to hypotension







Medical Equipment

No Medical Equipment data



Health Concerns Section

Health Concerns data not found



Goals Section

Goals data not found



Interventions Section

Interventions data not found



Health Status Evaluations/Outcomes Section

Health Status Evaluations/Outcomes data not found



Advance Directives

No Advance Directive data

## 2021-12-28 NOTE — XMS REPORT
CCD document using C-CDA

                             Created on: 2021



AyalaRosibel vegas

External Reference #: [id number]

: 1970

Sex: Female



Demographics





                          Address                   [address]

[place]  [address]

 

                          Home Phone                [phone]

 

                          Preferred Language        Unknown

 

                          Marital Status            Unknown

 

                          Alevism Affiliation     Unknown

 

                          Race                      White

 

                          Ethnic Group              Not  or 





Author





                          Author                    Rosibel Crump

 

                          Organization              [facility]

 

                          Address                   [address] [place]  [address]



 

                          Phone                     [phone]







Care Team Providers





                    Care Team Member Name Role                Phone

 

                    [facility] Crump     PP                  Unavailable

 

                          CCM                       Unavailable







Summary Purpose

Interface Exchange



Insurance Providers





             Payer name   Policy type / Coverage type Covered party ID Effective

 Begin Date 

Effective End Date

 

             [facility] Commercial Insurance [id number] 

Unknown      

Unknown







Family history





Mother



                          Diagnosis                 Age At Onset

 

                          Diabetes mellitus Type 2  Unknown

 

                          Alcoholism                Unknown

 

                          Depression                Unknown







Father



                          Diagnosis                 Age At Onset

 

                          Diabetes mellitus Type 2  Unknown







Social History





                Social History Element Codes           Description     Effective

 Dates

 

                    Marital status      Unknown             

Ha                                   08/15/2019

 

                Number of children Unknown         2               08/15/2019

 

                    Employment          Unknown             Currently employed

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

 

                Tobacco history SNOMED CT: 05429571 Current some days smoker 08/

15/2019

 

                Alcohol history SNOMED CT: 970655089 Never drinks alcohol 08/15/

2019







Allergies, Adverse Reactions, Alerts





           Substance  Reaction   Codes      Entered Date Inactivated Date Status

 

           Penicillin            Unknown    08/15/2019 No Inactive Date Active







Problems





                Condition       Codes           Effective Dates Condition Status

 

                          Acute bronchitis          ICD-10: J20.9

ICD-9: 466.0              2019                Active

 

                          Cough                     ICD-10: R05.9

ICD-9: 786.2              10/25/2021                Active

 

                          Encounter for screening mammogram for malignant neopla

sm of breast ICD-10: 

Z12.31

ICD-9: V76.12             11/10/2020                Active

 

                          Generalized anxiety disorder ICD-10: F41.9

ICD-9: 300.00             10/25/2021                Active

 

                          Other allergic rhinitis   ICD-10: J30.89

ICD-9: 477.8              2020                Active

 

                          Menopausal and female climacteric states ICD-10: N95.1

ICD-9: 627.2              08/15/2019                Active

 

                          Encounter for general adult medical examination withou

t abnormal findings ICD-

10: Z00.00

ICD-9: V70.0              08/15/2019                Active

 

                          Screening, lipid          ICD-10: Z13.220

ICD-9: V77.91             10/22/2020                Active

 

                          Migraine without status migrainosus, not intractable, 

unspecified migraine type 

ICD-10: G43.909

ICD-9: 346.90             2020                Active

 

                          Headache                  ICD-10: R51

ICD-9: 784.0              2020                Active

 

                          Other acute sinusitis     ICD-10: J01.80

ICD-9: 461.8              2020                Active

 

                          Generalized anxiety disorder ICD-10: F41.1

ICD-9: 300.00             08/15/2019                Active

 

                                        Encounter for gynecological examination 

(general) (routine) without abnormal 

findings                                ICD-10: Z01.419

ICD-9: V72.31             2020                Active

 

                          Cough                     ICD-10: R05

ICD-9: 786.2              2019                Active

 

                          Body aches                ICD-10: R52

ICD-9: 780.96             2019                Active

 

                          Fever                     ICD-10: R50.9

ICD-9: 780.60             2019                Active

 

                          Pain in left ankle and joints of left foot ICD-10: M25

.572

ICD-9: 719.47             2019                Active

 

                          Pain in left knee         ICD-10: M25.562

ICD-9: 719.46             2019                Active







Medications





          Medication Codes     Instructions Start Date Stop Date Status    Fill 

Instructions

 

                prednisone 20 mg tablet RxNorm: 381511  Take 2 Tablet(s) Oral ev

estevan day 

2021          Active               

 

                cefdinir 300 mg capsule RxNorm: 965789  Take 1 Capsule(s) Oral t

wo times a day 

2021          Active               

 

                    promethazine 6.25 mg-codeine 10 mg/5 mL syrup RxNorm: 303546

      Take 5 

Milliliter(s) Oral every 4-6 hours as needed cough 10/25/2021          10/25/202

1          

Inactive                                 

 

                prednisone 20 mg tablet RxNorm: 534772  Take 2 Tablet(s) Oral ev

estevan day 

10/25/2021          10/29/2021          Inactive             

 

                    promethazine 6.25 mg-codeine 10 mg/5 mL syrup RxNorm: 963882

      Take 5 

Milliliter(s) Oral every 4-6 hours as needed cough 10/25/2021          11/03/202

1          

Inactive                                 

 

                    Kenalog 40 mg/mL suspension for injection RxNorm: 6156797   

  Take 1 Milliliter(s) 

Injection       10/25/2021      10/25/2021      Inactive         

 

                    alprazolam 0.5 mg tablet RxNorm: 700637      Take 1 Tablet(s

) Oral two times a day as

needed FOR ANXIETY 10/15/2021      2021      Active           

 

                    alprazolam 0.5 mg tablet RxNorm: 581009      1 Tablet(s) Ora

l two times a day as 

needed anxiety  2021      Inactive         

 

                    Depo-Estradiol 5 mg/mL intramuscular oil RxNorm: 752566     

 0.75 Milliliter(s) 

Intramuscular   2021      Inactive         

 

                    alprazolam 0.5 mg tablet RxNorm: 308282      1 Tablet(s) Ora

l two times a day as 

needed anxiety  2021      Inactive         

 

                    Depo-Estradiol 5 mg/mL intramuscular oil RxNorm: 180458     

 0.75 Milliliter(s) 

Intramuscular   2021      Inactive         

 

                    alprazolam 0.5 mg tablet RxNorm: 597217      1 Tablet(s) Ora

l two times a day as 

needed anxiety  2021      Inactive         

 

                    alprazolam 0.5 mg tablet RxNorm: 768262      1 Tablet(s) Ora

l two times a day as 

needed anxiety  04/15/2021      2021      Inactive         

 

                    Depo-Provera 150 mg/mL intramuscular suspension RxNorm: 1000

128     Milliliter(s) 

Intramuscular   2021      Inactive         

 

                    escitalopram 10 mg tablet RxNorm: 372956      TAKE ONE TABLE

T BY MOUTH EVERY NIGHT AT

BEDTIME Tablet(s) Oral 2021      Inactive         

 

                    alprazolam 0.5 mg tablet RxNorm: 090498      1 Tablet(s) Ora

l two times a day as 

needed anxiety  2021      Inactive         

 

                    escitalopram 5 mg tablet RxNorm: 380902      TAKE ONE TABLET

 BY MOUTH EVERY NIGHT AT 

BEDTIME         02/15/2021      2021      Inactive         

 

                    alprazolam 0.5 mg tablet RxNorm: 388303      1 Tablet(s) Ora

l two times a day as 

needed anxiety  02/15/2021      2021      Inactive         

 

                atorvastatin 20 mg tablet RxNorm: 097098  TAKE ONE TABLET BY SHELLY

TH DAILY 

2021          No Stop Date        Active               

 

                    Depo-Estradiol 5 mg/mL intramuscular oil RxNorm: 354953     

 3/4 Milliliter(s) 

Intramuscular monthly 2021      Inactive         

 

                    Depo-Provera 150 mg/mL intramuscular suspension RxNorm: 1000

128     Milliliter(s) 

Intramuscular   2021      Inactive         

 

                    alprazolam 0.5 mg tablet RxNorm: 526284      1 Tablet(s) Ora

l two times a day as 

needed anxiety  2020      Inactive         

 

                    acyclovir 400 mg tablet RxNorm: 396217      TAKE ONE TABLET 

BY MOUTH FOUR TIMES A DAY

                2020      Inactive         

 

                    alprazolam 0.5 mg tablet RxNorm: 963697      1 Tablet(s) Ora

l two times a day as 

needed anxiety  2020      12/15/2020      Inactive         

 

             atorvastatin 20 mg tablet RxNorm: 935173 1 Tablet(s) Oral every day

 2020                Inactive                   

 

             atorvastatin 20 mg tablet RxNorm: 573396 1 Tablet(s) Oral every day

 2020   

02/10/2021                Inactive                   

 

                    alprazolam 0.5 mg tablet RxNorm: 432160      1 Tablet(s) Ora

l two times a day as 

needed anxiety  10/23/2020      11/15/2020      Inactive         

 

                    Depo-Provera 150 mg/mL intramuscular suspension RxNorm: 1000

128     Milliliter(s) 

Intramuscular   10/20/2020      10/20/2020      Inactive         

 

                    acyclovir 400 mg tablet RxNorm: 434761      TAKE ONE TABLET 

BY MOUTH FOUR TIMES A DAY

                10/06/2020      11/15/2020      Inactive         

 

                    alprazolam 0.5 mg tablet RxNorm: 639766      1 Tablet(s) Ora

l two times a day as 

needed anxiety  2020      10/21/2020      Inactive         

 

                    alprazolam 0.5 mg tablet RxNorm: 381430      1 Tablet(s) Ora

l two times a day as 

needed anxiety  2020      Inactive         

 

                escitalopram 5 mg tablet RxNorm: 074702  1 Tablet(s) Oral every 

night at bedtime 

2020          Inactive             

 

             prednisone 20 mg tablet RxNorm: 696739 2 Tablet(s) Oral every day 0

2020                Inactive                   

 

                    Depo-Provera 150 mg/mL intramuscular suspension RxNorm: 1000

128     0.75 

Milliliter(s) Intramuscular 2020      Inactive         

 

                    Kenalog 40 mg/mL suspension for injection RxNorm: 9405741   

  1 Milliliter(s) 

Injection       2020      Inactive         

 

                Zithromax Z-Timur 250 mg tablet RxNorm: 196339  Tablet(s) Oral as 

directed 

2020          Inactive             

 

                    alprazolam 0.5 mg tablet RxNorm: 476262      1 Tablet(s) Ora

l two times a day as 

needed anxiety  2020      Inactive         

 

                    alprazolam 0.5 mg tablet RxNorm: 218768      1 Tablet(s) Ora

l two times a day as 

needed anxiety  2020      Inactive         

 

                    Depo-Provera 150 mg/mL intramuscular suspension RxNorm: 1000

128     Milliliter(s) 

Intramuscular   2020      Inactive         

 

                    acyclovir 400 mg tablet RxNorm: 223162      1 Tablet(s) Oral

 four times a day fever 

blisters        2020      Inactive         

 

                    Depo-Provera 150 mg/mL intramuscular suspension RxNorm: 1000

128     Milliliter(s) 

Intramuscular   2020      Inactive         

 

                pravastatin 10 mg tablet RxNorm: 791796  1 Tablet(s) Oral every 

night at bedtime 

2020          Inactive             

 

                pravastatin 10 mg tablet RxNorm: 755086  1 Tablet(s) Oral every 

night at bedtime 

2020          Inactive             

 

                    Depo-Estradiol 5 mg/mL intramuscular oil RxNorm: 146902     

 3/4 Milliliter(s) 

Intramuscular monthly 2020      Inactive         

 

                    Kenalog 40 mg/mL suspension for injection RxNorm: 2447603   

  Milliliter(s) 

Injection       2019      Inactive         

 

                    Phenergan with Codeine Syrup RxNorm:             5-10 Millil

iter(s) Oral Every 6 hrs as 

needed          2019      Inactive         

 

             prednisone 20 mg tablet RxNorm: 162102 2 Tablet(s) Oral every day 1

2019                Inactive                  start tomorrow

 

                    doxycycline hyclate 100 mg tablet RxNorm: 3486284     1 Tabl

et(s) Oral two times a 

day             2019      Inactive        start if symptom

s do not improve

 

                    Depo-Estradiol 5 mg/mL intramuscular oil RxNorm: 554327     

 3/4 Milliliter(s) 

Intramuscular monthly 2019      Inactive         

 

                    Depo-Estradiol 5 mg/mL intramuscular oil RxNorm: 025467     

 3/4 Milliliter(s) 

Intramuscular monthly 10/31/2019      2019      Inactive         

 

                    gabapentin 100 mg capsule RxNorm: 918584      1 Capsule(s) O

ral every night at 

bedtime         10/16/2019      2019      Inactive         

 

             gabapentin 100 mg capsule RxNorm: 249531 1 Capsule(s) PO QHS 09/12/

2019   

10/11/2019                Inactive                   

 

           naproxen 500 mg tablet RxNorm: 074736 1 Tablet(s) PO BID 2019 0

2019 

Inactive                                 

 

             escitalopram 5 mg tablet RxNorm: 995176 1 Tablet(s) PO QHS 08/15/20

19   2019

                          Inactive                   

 

             ranitidine 150 mg capsule RxNorm: 375945 1 Capsule(s) PO BID 2019                Inactive                   

 

             eszopiclone 1 mg tablet RxNorm: 499969 Tablet(s) PO as needed 2020                Inactive                   

 

                acyclovir 400 mg tablet RxNorm: 095135  1 Tablet(s) PO PRN fever

 blisters 

2020          Inactive             

 

             clonidine HCl 0.1 mg tablet RxNorm: 475347 Tablet(s) PO as needed 0

8/15/2019   

2019                Inactive                   







Medication Administered





             Medication   Codes        Instructions Start Date   Status

 

                Kenalog 40 mg/mL suspension for injection RxNorm: 8219379 1Milli

liter     10/25/2021

                                        No longer Active

 

                Depo-Estradiol 5 mg/mL intramuscular oil RxNorm: 994092  0.75Mil

liliter  

2021                              No longer Active

 

                Depo-Estradiol 5 mg/mL intramuscular oil RxNorm: 154425  0.75Mil

liliter  

2021                              No longer Active

 

                Depo-Provera 150 mg/mL intramuscular suspension RxNorm: 1379401 

Milliliter      

2021                              No longer Active

 

                Depo-Provera 150 mg/mL intramuscular suspension RxNorm: 6921111 

Milliliter      

2021                              No longer Active

 

                Depo-Provera 150 mg/mL intramuscular suspension RxNorm: 8794097 

Milliliter      

10/20/2020                              No longer Active

 

                Depo-Provera 150 mg/mL intramuscular suspension RxNorm: 1215726 

0.75Milliliter  

2020                              No longer Active

 

                Kenalog 40 mg/mL suspension for injection RxNorm: 6243015 1Milli

liter     2020

                                        No longer Active

 

                Depo-Provera 150 mg/mL intramuscular suspension RxNorm: 2994479 

Milliliter      

2020                              No longer Active

 

                Depo-Provera 150 mg/mL intramuscular suspension RxNorm: 7964265 

Milliliter      

2020                              No longer Active

 

                Depo-Estradiol 5 mg/mL intramuscular oil RxNorm: 324580  3/4Mill

ilitermonthly 

2020                              Active

 

             Kenalog 40 mg/mL suspension for injection RxNorm: 2807267 Millilite

r   2019   

No longer Active

 

                Depo-Estradiol 5 mg/mL intramuscular oil RxNorm: 964570  3/4Mill

ilitermonthly 

2019                              Active







Immunizations

No Immunization data



Results





          Observation Observation Code Item      Item Code Result    Date      S

ervice Location

 

          Lipid     Ord30     CHOL                249 mg/dL 10/22/2020 Unknown

 

          Lipid     Ord30     HDL                 55.0 mg/dl 10/22/2020 Unknown

 

          Lipid     Ord30     TRIG                82 mg/dL  10/22/2020 Unknown

 

          Lipid     Ord30     LDL                 178 mg/dL 10/22/2020 Unknown

 

          Lipid     Ord30     C/HDL               4.5 Ratio 10/22/2020 Unknown

 

          Comp Metabolic Fpj205    NA                  138 mEq/L 10/22/2020 [facility]

own

 

          Comp Metabolic Jcm705    K                   4.5 mEq/L 10/22/2020 [facility]

own

 

          Comp Metabolic Vyd904    CL                  103 mEq/L 10/22/2020 [facility]

own

 

          Comp Metabolic Bof616    CO2                 28.0 mEq/L 10/22/2020 [facility]

nown

 

          Comp Metabolic Cnv751    ANION GAP           12        10/22/2020 [facility]

own

 

          Comp Metabolic Gqq742    GLUCOSE             90 mg/dL  10/22/2020 [facility]

own

 

          Comp Metabolic Rbs375    Creat               0.7 mg/dL 10/22/2020 [facility]

own

 

          Comp Metabolic Cju982    eGFR                88 ml/min/1.73m2 10/22/20

20 Unknown

 

          Comp Metabolic Vya285    BUN                 20 mg/dL  10/22/2020 [facility]

own

 

          Comp Metabolic Raz602    B/C Ratio           27.0 Ratio 10/22/2020 [facility]

nown

 

          Comp Metabolic Wwc501    CALCIUM             9.5 mg/dL 10/22/2020 [facility]

own

 

          Comp Metabolic Wdl009    ALK PHOS            87 U/L    10/22/2020 [facility]

own

 

          Comp Metabolic Gjn060    AST(SGOT)           17 U/L    10/22/2020 [facility]

own

 

          Comp Metabolic Oll754    ALT(SGPT)           20 U/L    10/22/2020 [facility]

own

 

          Comp Metabolic Knz719    BILI T              0.6 mg/dL 10/22/2020 [facility]

own

 

          Comp Metabolic Wvl041    ALBUMIN             4.5 g/dL  10/22/2020 [facility]

own

 

          Comp Metabolic Xdr121    TPRO                7.6 g/dL  10/22/2020 [facility]

own

 

          Comp Metabolic Wtt235    GLOB                3.1 g/dL  10/22/2020 [facility]

own

 

          Comp Metabolic Cnr160    A/G Ratio           1.5 Ratio 10/22/2020 [facility]

own

 

          Comp Metabolic Foe510    Osmo                278 mOsmo 10/22/2020 [facility]

own

 

          Cbc With Differential Ord2      WBC                 10.56 K/ul 10/22/2

020 Unknown

 

          Cbc With Differential Ord2      RBC                 4.44 M/ul 10/22/20

20 Unknown

 

          Cbc With Differential Ord2      HGB                 14.0 g/dl 10/22/20

20 Unknown

 

          Cbc With Differential Ord2      HCT                 42.8 %    10/22/20

20 Unknown

 

          Cbc With Differential Ord2      Neut%               73.9 %    10/22/20

20 Unknown

 

          Cbc With Differential Ord2      MCV                 96.4 fl   10/22/20

20 Unknown

 

          Cbc With Differential Ord2      Lymph%              18.2 %    10/22/20

20 Unknown

 

          Cbc With Differential Ord2      MCH                 31.5 pg   10/22/20

20 Unknown

 

          Cbc With Differential Ord2      Mono%               5.4 %     10/22/20

20 Unknown

 

          Cbc With Differential Ord2      Eos%                2.2 %     10/22/20

20 Unknown

 

          Cbc With Differential Ord2      MCHC                32.7 pg   10/22/20

20 Unknown

 

          Cbc With Differential Ord2      PLT                 258 K/ul  10/22/20

20 Unknown

 

          Cbc With Differential Ord2      Baso%               0.3 %     10/22/20

20 Unknown

 

          Cbc With Differential Ord2      Neut ABS#           7.81 K/ul 10/22/20

20 Unknown

 

          Cbc With Differential Ord2      RDW                 13.4 %    10/22/20

20 Unknown

 

          Cbc With Differential Ord2      Lymph ABS#           1.92 K/ul 10/22/2

020 Unknown

 

          Cbc With Differential Ord2      Mono ABS#           0.6 K/ul  10/22/20

20 Unknown

 

          Cbc With Differential Ord2      Eos ABS#            0.2 K/ul  10/22/20

20 Unknown

 

          Cbc With Differential Ord2      Baso ABS#           0.0 K/ul  10/22/20

20 Unknown

 

          Tsh       Ord6      TSH (3rd IS)           1.38 uIU/mL 2020 [facility]

own

 

          Comp Metabolic Bdn365    NA                  141 mEq/L 2020 [facility]

own

 

          Comp Metabolic Yak174    K                   4.3 mEq/L 2020 [facility]

own

 

          Comp Metabolic Xho709    CL                  104 mEq/L 2020 [facility]

own

 

          Comp Metabolic Pyx432    CO2                 29.0 mEq/L 2020 [facility]

nown

 

          Comp Metabolic Vwl120    ANION GAP           12        2020 [facility]

own

 

          Comp Metabolic Pqy336    GLUCOSE             83 mg/dL  2020 [facility]

own

 

          Comp Metabolic Wgu016    Creat               0.7 mg/dL 2020 [facility]

own

 

          Comp Metabolic Okd803    eGFR                102 ml/min/1.73m2 

020 Unknown

 

          Comp Metabolic Wix161    BUN                 16 mg/dL  2020 [facility]

own

 

          Comp Metabolic Mqr766    B/C Ratio           24.6 Ratio 2020 [facility]

nown

 

          Comp Metabolic Kio295    CALCIUM             9.3 mg/dL 2020 [facility]

own

 

          Comp Metabolic Ekr735    ALK PHOS            79 U/L    2020 [facility]

own

 

          Comp Metabolic Gcz552    AST(SGOT)           19 U/L    2020 [facility]

own

 

          Comp Metabolic Uie072    ALT(SGPT)           26 U/L    2020 [facility]

own

 

          Comp Metabolic Kvk329    BILI T              0.5 mg/dL 2020 [facility]

own

 

          Comp Metabolic Kka520    ALBUMIN             4.2 g/dL  2020 [facility]

own

 

          Comp Metabolic Aoq347    TPRO                7.0 g/dL  2020 [facility]

own

 

          Comp Metabolic Box490    GLOB                2.8 g/dL  2020 [facility]

own

 

          Comp Metabolic Ske945    A/G Ratio           1.5 Ratio 2020 [facility]

own

 

          Comp Metabolic Emb164    Osmo                282 mOsmo 2020 [facility]

own

 

          Cbc With Differential Ord2      WBC                 8.94 K/ul 20

20 Unknown

 

          Cbc With Differential Ord2      RBC                 4.34 M/ul 20

20 Unknown

 

          Cbc With Differential Ord2      HGB                 13.3 g/dl 20

20 Unknown

 

          Cbc With Differential Ord2      HCT                 40.7 %    20

20 Unknown

 

          Cbc With Differential Ord2      Neut%               71.8 %    20

20 Unknown

 

          Cbc With Differential Ord2      MCV                 93.8 fl   20

20 Unknown

 

          Cbc With Differential Ord2      Lymph%              19.5 %    20

20 Unknown

 

          Cbc With Differential Ord2      MCH                 30.6 pg   20

20 Unknown

 

          Cbc With Differential Ord2      Mono%               6.2 %     20

20 Unknown

 

          Cbc With Differential Ord2      Eos%                2.2 %     20

20 Unknown

 

          Cbc With Differential Ord2      MCHC                32.7 pg   20

20 Unknown

 

          Cbc With Differential Ord2      Baso%               0.3 %     20

20 Unknown

 

          Cbc With Differential Ord2      PLT                 262 K/ul  20

20 Unknown

 

          Cbc With Differential Ord2      RDW                 13.4 %    20

20 Unknown

 

          Cbc With Differential Ord2      Neut ABS#           6.42 K/ul 20

20 Unknown

 

          Cbc With Differential Ord2      Lymph ABS#           1.74 K/ul 

020 Unknown

 

          Cbc With Differential Ord2      Mono ABS#           0.6 K/ul  20

20 Unknown

 

          Cbc With Differential Ord2      Eos ABS#            0.2 K/ul  20

20 Unknown

 

          Cbc With Differential Ord2      Baso ABS#           0.0 K/ul  20

20 Unknown

 

          Lipid     Ord30     CHOL                240 mg/dL 2020 Unknown

 

          Lipid     Ord30     HDL                 57.0 mg/dl 2020 Unknown

 

          Lipid     Ord30     TRIG                80 mg/dL  2020 Unknown

 

          Lipid     Ord30     LDL                 167 mg/dL 2020 Unknown

 

          Lipid     Ord30     C/HDL               4.2 Ratio 2020 Unknown

 

          C A/B FLU [id number]   Influenza A Scr           Negative  2019 Unk

nown

 

          C A/B FLU [id number]   Influenza B Scr           Negative  2019 Unk

nown

 

           C A/B FLU  [id number]    Influenza Intrp B AG:PRID:PT:NOSE:NOM:IF       

     See Footnote  

2019                              Unknown







Procedures





                    Procedure           Codes               Date

 

                    THER/PROPH/DIAG INJ SC/IM CPT-4: 28489        2021

 

                    THER/PROPH/DIAG INJ SC/IM CPT-4: 68301        2021

 

                    THER/PROPH/DIAG INJ SC/IM CPT-4: 44448        2021

 

                    THER/PROPH/DIAG INJ SC/IM CPT-4: 95744        2021

 

                    THER/PROPH/DIAG INJ SC/IM CPT-4: 78699        10/20/2020

 

                    THER/PROPH/DIAG INJ SC/IM CPT-4: 48279        2020

 

                    TRIAMCINOLONE ACET INJ NOS 10 mg CPT-4:         

020

 

                    THER/PROPH/DIAG INJ SC/IM CPT-4: 65687        2020

 

                    THER/PROPH/DIAG INJ SC/IM CPT-4: 54112        2020

 

                    THER/PROPH/DIAG INJ SC/IM CPT-4: 11615        2020

 

                    TRIAMCINOLONE ACET INJ NOS 10 mg CPT-4:         

019

 

                    THER/PROPH/DIAG INJ SC/IM CPT-4: 13293        2019







Vital Signs





                          Date                      Vital

 

                10/25/2021      Blood Pressure 1: 142/68 Code: 8480-6 BMI: 30.7 

Code: 12116-8 Heart 

Rate 1: 63 bpm      Height: 5'2" Code: 8302-2 SpO2: 97%           Temperature: 3

6.4 (C) / 97.6 

(F)                                     Weight: 168 lbs  Code: 32563-7

 

                2020      Blood Pressure 1: 130/80 Code: 8480-6 BMI: 28.5 

Code: 74778-1 Heart 

Rate 1: 69 bpm      Height: 5'2" Code: 8302-2 SpO2: 97%           Temperature: 3

6.9 (C) / 98.4 

(F)                                     Weight: 156 lbs  Code: 38016-7

 

                2020      Blood Pressure 1: 128/78 Code: 8480-6 Heart Rate

 1: 74 bpm Height:  

Code: 8302-2              SpO2: 98%                 Weight:  Code: 88704-1

 

                    2020          Height:  Code: 8302-2 Weight:  Code: 294

63-7

 

                2020      Blood Pressure 1: 132/80 Code: 8480-6 BMI: 28.9 

Code: 90642-5 Heart 

Rate 1: 68 bpm      Height: 5'2" Code: 8302-2 SpO2: 97%           Weight: 158 lb

s  Code: 

58117-5

 

                2019      Blood Pressure 1: 110/60 Code: 8480-6 BMI: 28.5 

Code: 54563-6 Heart 

Rate 1: 76 bpm      Height: 5'2" Code: 8302-2 SpO2: 98%           Temperature: 3

6.9 (C) / 98.5 

(F)                                     Weight: 156 lbs  Code: 65277-1

 

             2019   Blood Pressure 1: 124/80 Code: 8480-6 Heart Rate 1: 81

 bpm SpO2: 98%    

Temperature: 36.7 (C) / 98.1 (F)

 

                2019      Blood Pressure 1: 130/78 Code: 8480-6 BMI: 28.0 

Code: 82760-7 Heart 

Rate 1: 68 bpm      Height: 5'2" Code: 8302-2 SpO2: 98%           Weight: 153 lb

s  Code: 

77489-4

 

                2019      Heart Rate 1: 68 bpm Height:  Code: 8302-2 Weigh

t:  Code: 67064-0

 

                2019      Blood Pressure 1: 132/74 Code: 8480-6 BMI: 27.4 

Code: 29143-8 Heart 

Rate 1: 66 bpm      Height: 5'2" Code: 8302-2 SpO2: 98%           Weight: 150 lb

s  Code: 

10553-2

 

                08/15/2019      Blood Pressure 1: 104/60 Code: 8480-6 BMI: 27.5 

Code: 95866-9 Heart 

Rate 1: 66 bpm      Height: 5'2" Code: 8302-2 SpO2: 98%           Weight: 150 lb

s 5 oz Code: 

81833-6







Functional Status

No Functional Status data



Reason For Visit





                    Reason For Visit    Effective Dates     Notes

 

                    sinus congestion    10/25/2021           

 

                    headache            2020           

 

                    headache            2020           

 

                    well woman exam (40-65 years) 2020           

 

                    cough               2019           

 

                    fever               2019           

 

                    menopausal symptoms 2019           

 

                    lower leg pain      2019           

 

                    lower leg pain      2019           

 

                    anxiety             08/15/2019           







Encounters





             Encounter    Performer    Location     Codes        Date

 

                                        [id number]) 16467 EST. PATIENT, LEVEL IV

Diagnosis: Generalized anxiety disorder[ICD10: F41.9]

Diagnosis: Cough[ICD10: R05.9]

Diagnosis: Other allergic rhinitis[ICD10: J30.89]

Diagnosis: Encounter for screening mammogram for malignant neoplasm of 
breast[ICD10: Z12.31] [facility] CPT-4: 61796    

10/25/2021

 

                                        [phone]) 24330 EST. PATIENT, LEVEL III

Diagnosis: Headache[ICD10: R51]

Diagnosis: Other allergic rhinitis[ICD10: J30.89] Bree Gutierres MD, [facility]          CPT-4: 04099              2020

 

                                        92036 EST. PATIENT, LEVEL III

Diagnosis: Other acute sinusitis[ICD10: J01.80]

Diagnosis: Other allergic rhinitis[ICD10: J30.89]

Diagnosis: Migraine without status migrainosus, not intractable, unspecified 
migraine type[ICD10: G43.909]

Diagnosis: Menopausal and female climacteric states[ICD10: N95.1] [facility]    CPT-4: 21744              2020

 

                                        [phone]) 32179 EST. PATIENT, LEVEL III

Diagnosis: Generalized anxiety disorder[ICD10: F41.1] Yesica Crump MD, [facility]          CPT-4: 45149              2020

 

                                        ([id number]) PREV VISIT EST AGE 40-64

Diagnosis: Encounter for gynecological examination (general) (routine) without 
abnormal findings[ICD10: Z01.419] [facility] CPT

-4:

12977                                   2020

 

                                        [phone]) 56722 EST. PATIENT, LEVEL III

Diagnosis: Cough[ICD10: R05]

Diagnosis: Acute bronchitis[ICD10: J20.9] [facility]                       CPT-4: 55219              2019

 

                                        [phone]) 32919 EST. PATIENT, LEVEL III

Diagnosis: Fever[ICD10: R50.9]

Diagnosis: Body aches[ICD10: R52] Bree Crump MD, [facility] CPT

-4:

42449                                   2019

 

                                        [phone]) 65375 EST. PATIENT, LEVEL III

Diagnosis: Menopausal and female climacteric states[ICD10: N95.1] Bree Crump MD, [facility] CPT-4: 70483        2019

 

                                        28941 EST. PATIENT, LEVEL III

Diagnosis: Pain in left ankle and joints of left foot[ICD10: M25.572]

Diagnosis: Pain in left knee[ICD10: M25.562] Flakita jackson MD, [facility]

                          CPT-4: 63856              2019

 

                                        93407 EST. PATIENT, LEVEL III

Diagnosis: Pain in left ankle and joints of left foot[ICD10: M25.572]

Diagnosis: Pain in left knee[ICD10: M25.562] Flakita jackson MD, [facility]

                          CPT-4: 85811              2019

 

                                        (46303) PREV VISIT NEW AGE 40-64

Diagnosis: Encounter for general adult medical examination without abnormal 
findings[ICD10: Z00.00] Yesica Crump MD, [facility] CPT-4: 46967    

08/15/2019







Plan of Care





             Planned Activity Notes        Codes        Status       Date

 

             Patient Education: Patient Medication Summary                      

     Completed    2021

 

                          Visit Plan:               Allergies exacerbation with 

cough - chronic - recommended pt to use

allergy medication as prescribed. Pt has been counseled as to the appropriate 
use of the medication. Pt to call if allergy symptoms are not controlled with 
the medication. If using nasal spray, instructions as follows: Nasal spray- use 
twice daily, one spray per nostril twice daily, after 30 minutes, rinse out nose
with saline spray.. Use opposite hand per nostril to spray in the nasal steroid 
allergy spray. Kenalog injection given at visit. Prednisone rx sent. Phenergan 
with codeine cough medicine sent. Screening mammogram - orders sent. Anxiety - 
controlled. Continue prn alprazolam at bedtime and escitalopram. Fasting lab 
orders sent

                                                            10/25/2021

 

                                        Appointment: Eulalia Osman 

WPtel:[phone]

                                        [address] [facility]

                                              (30 min) Complex 10/25/2021

 

             Patient Education: Patient Medication Summary                      

     Completed    10/25/2021

 

             Patient Education: prednisone- [employer] [id number]         

                  Completed    

10/25/2021

 

             Care Plan: Lipid                           Pending      10/25/2021

 

             Care Plan: Cbc With Differential                           Pending 

     10/25/2021

 

             Care Plan: Comp Metabolic                           Pending      10

/

 

             Care Plan: Tsh                           Pending      10/25/2021

 

             Appointment:                            Injection    2021

 

             Patient Education: Patient Medication Summary                      

     Completed    2021

 

             Appointment:                            Injection    2021

 

             Patient Education: Patient Medication Summary                      

     Completed    2021

 

             Appointment:                            Injection    2021

 

             Appointment:                            Injection    2021

 

             Patient Education: Patient Medication Summary                      

     Completed    2021

 

             Appointment:                            Injection    2021

 

             Patient Education: Patient Medication Summary                      

     Completed    2021

 

             Patient Education: Patient Medication Summary                      

     Completed    11/10/2020

 

             Patient Education: Patient Medication Summary                      

     Completed    10/22/2020

 

             Appointment:                            Injection    10/20/2020

 

             Patient Education: Patient Medication Summary                      

     Completed    10/20/2020

 

                          Visit Plan:               Allergies - chronic - recomm

ended pt to use allergy medication as 

prescribed. Pt has been counseled as to the appropriate use of the medication. 
Pt to call if allergy symptoms are not controlled with the medication. If using 
nasal spray, instructions as follows: Nasal spray- use twice daily, one spray 
per nostril twice daily, after 30 minutes, rinse out nose with saline spray.. 
Use opposite hand per nostril to spray in the nasal steroid allergy spray. 
Headache -will treat allergies/congestion - instructed patient to call if does 
not improve and we will order CT Head without contrast.

                                                            2020

 

                                        Appointment: Bree Oleary 

WPtel:[phone]

                                        [address] [facility]

                                              (15 min) Moderate 2020

 

             Patient Education: Patient Medication Summary                      

     Completed    2020

 

                          Visit Plan:               Sinusitis - Pt has acute inf

ection - pain in face, maxillary 

region, Pt informed to use decongestant, RX given to patient, sinus rinses also 
recommended. Call if symptoms do not show improvement. Allergies - chronic - 
recommended pt to use allergy medication as prescribed. Pt has been counseled as
to the appropriate use of the medication. Pt to call if allergy symptoms are not
controlled with the medication. If using nasal spray, instructions as follows: 
Nasal spray- use twice daily, one spray per nostril twice daily, after 30 
minutes, rinse out nose with saline spray.. Use opposite hand per nostril to 
spray in the nasal steroid allergy spray. Migraine - will treat for sinus 
infection - pt is to notify clinic if symptoms do not improve, if they worsen, 
or with any changes, questions, or concerns.

                                                            2020

 

                                        Appointment: Flakita Castro 

WPtel:[phone]

                                        [facility]

                                              (30 min) Complex 2020

 

             Patient Education: Patient Medication Summary                      

     Completed    2020

 

                          Visit Plan:               Anxiety - the patient has un

controlled anxiety and will benefit 

from a short term dose of xanax for control of symptoms.

                                                            2020

 

                                        Appointment: Yesica Crump 

WPtel:[phone]

                                        [facility]

                                              TeleHealth      2020

 

             Patient Education: Patient Medication Summary                      

     Completed    2020

 

             Appointment:                            Injection    2020

 

             Patient Education: Patient Medication Summary                      

     Completed    2020

 

             Appointment:                            Injection    2020

 

             Appointment:                            Injection    2020

 

             Patient Education: Patient Medication Summary                      

     Completed    2020

 

                                        Appointment: Yesica Crump 

WPtel:[phone]

                                        [facility]

                                              Well Woman      03/10/2020

 

                          Visit Plan:               Well Adult Female - exam com

pleted. Pap and breast exam completed. 

Pt will be called with results of her testing. She was advised to continue with 
yearly annual exams. Safe sex practices discussed during office visit today. 
Call if any abnormal gynecologic issues during the next year, otherwise, RTC 
yearly or prn.

                                                            2020

 

                                        Appointment: Bree Oleary 

WPtel:[phone]

                                        [facility]

                                              Well Woman      2020

 

             Patient Education: Patient Medication Summary                      

     Completed    2020

 

                                        Appointment: Yesica Crump 

WPtel:[phone]

                                        [facility]

                                              (15 min) Moderate 2020

 

             Appointment:                            Injection    2020

 

             Patient Education: Patient Medication Summary                      

     Completed    2020

 

                          Visit Plan:               Bronchitis - acute case of b

ronchitis identified. Pt has been given

antibiotics, breathing treatments as appropriate, and pt has been instructed to 
call if symptoms are not improved, or if symptoms acutely worsen.

                                                            2019

 

                                        Appointment: Bree Oleary 

WPtel:[phone]

                                        [facility]

                                              (15 min) Moderate 2019

 

             Patient Education: Patient Medication Summary                      

     Completed    2019

 

                          Visit Plan:               URI -viral -flu swab to r/o 

flu - Pt advised to increase fluids, 

vitamin C. Discussed natural and expected course of this diagnosis and need to 
alert me if symptoms do not follow expected course, or if any worse.

                                                            2019

 

                                        Appointment: Bree Oleary 

WPtel:[phone]

                                        [address] [place], [place] [address]

                                              (30 min) Complex 2019

 

             Patient Education: Patient Medication Summary                      

     Completed    2019

 

                          Visit Plan:               Post-surgical menopause - co

ntinue to taper depo dose and 

eventually off medication completely -injection today in the office.

                                                            2019

 

                                        Appointment: Bree Oleary 

WPtel:[phone]

                                        [facility]

                                              (15 min) Moderate 2019

 

             Patient Education: Patient Medication Summary                      

     Completed    2019

 

                                        Appointment: Bree Oleary 

WPtel:[phone]

                                        [facility]

                                              (30 min) Complex 2019

 

                          Visit Plan:               Left knee, ankle, foot pain 

- will send RX - will refer to ortho - 

The pt is to use prn antiinflammatories to manage acute pain. The patient is to 
call the office if the pain is worsening or does not improve.

                                                            2019

 

                                        Appointment: Flakita Castro 

WPtel:[phone]

                                        [facility]

                                              (30 min) Complex 2019

 

             Patient Education: Patient Medication Summary                      

     Completed    2019

 

                    Care Plan: Referral Order                     SNOMED-CT : 30

[id number]

                          Pending                   2019

 

                          Visit Plan:               Left knee, ankle, foot pain 

- will send RX - if no improvement will

refer to ortho - The pt is to use prn antiinflammatories to manage acute pain. 
The patient is to call the office if the pain is worsening or does not improve.

                                                            2019

 

                                        Appointment: Flakita Castro 

WPtel:[phone]

                                        [facility]

                                              (30 min) [facility] 2019

 

             Patient Education: Patient Medication Summary                      

     Completed    2019

 

                          Visit Plan:               Well Adult - pt was counsele

d about diet, exercise, and encouraged 

to follow a heart healthy diet and increase activity level. The patient was 
instructed to RTC yearly for well adult exams and PRN for acute illnesses. The 
pt was also instructed to have yearly labs for check of cholesterol, thyroid, 
chem panel, CBC, and renal functioning. Post-surgical menopause - I have advised
the pt that we will look for her medications from [facility] to find out what her depo 
dose is so I can wean her off of it. Anxiety - the patient has uncontrolled 
anxiety and will benefit from an SSRI on a daily basis to attempt control of the
symptoms of anxiety (tachycardia, overwhelming sensations, stress, insomnia, 
etc). stop clonidine due to hypotension

                                                            08/15/2019

 

             Patient Education: Patient Medication Summary                      

     Completed    08/15/2019

 

             Referral: Álvaro Sellers  Referral                  Appointment Co

nfirmed  

 

             Referral: Álvaro Sellers  Referral                  Completed     







Instructions





                                        Comment

 

                                        DUE FOR ANNUAL MAMMOGRAM- order faxed



DUE TO ANNUAL FASTING LABS- orders given at visit



CONTINUE XYZOL OR ZYRTEC



STEROID SHOT TODAY



PREDNISONE TABLETS TAKE 2 TABLETS DAILY X 5 DAYS



PHENERGAN W/CODEINE COUGH MEDICINE

                                        . Allergies exacerbation with cough - jesus alcantara - recommended pt to use allergy 

medication as prescribed.  Pt has been counseled as  to the appropriate use of 
the medication.  Pt to call if allergy symptoms are not controlled with the 
medication.

If using nasal spray, instructions as follows: Nasal spray- use twice daily, one
spray per nostril twice daily, after 30 minutes, rinse out nose with saline 
spray.. Use opposite hand per nostril to spray in the nasal steroid allergy 
spray.  Kenalog injection given at visit. Prednisone rx sent. Phenergan with 
codeine cough medicine sent. 



Screening mammogram - orders sent.



Anxiety - controlled. Continue prn alprazolam at bedtime and escitalopram. 



Fasting lab orders sent







 

                                        . Allergies - chronic - recommended pt t

o use allergy medication as prescribed. 

Pt has been counseled as  to the appropriate use of the medication.  Pt to call 
if allergy symptoms are not controlled with the medication.

If using nasal spray, instructions as follows: Nasal spray- use twice daily, one
spray per nostril twice daily, after 30 minutes, rinse out nose with saline 
spray.. Use opposite hand per nostril to spray in the nasal steroid allergy 
spray.



Headache -will treat allergies/congestion - instructed patient to call if does 
not improve and we will order CT Head without contrast. 



 

                                        . Sinusitis - Pt has acute infection - p

ain in face, maxillary region, Pt 

informed to use decongestant, RX given to patient, sinus rinses also 
recommended.  Call if symptoms do not show improvement.



Allergies - chronic - recommended pt to use allergy medication as prescribed.  
Pt has been counseled as  to the appropriate use of the medication.  Pt to call 
if allergy symptoms are not controlled with the medication.

If using nasal spray, instructions as follows: Nasal spray- use twice daily, one
spray per nostril twice daily, after 30 minutes, rinse out nose with saline 
spray.. Use opposite hand per nostril to spray in the nasal steroid allergy 
spray.



Migraine - will treat for sinus infection - pt is to notify clinic if symptoms 
do not improve, if they worsen, or with any changes, questions, or concerns. 

 

                                        . Anxiety - the patient has uncontrolled

 anxiety and will benefit from a short 

term dose of xanax for control of symptoms.

 

                                        . Well Adult Female - exam completed.  P

ap and  breast exam completed.  Pt will 

be called with results of her testing.  She was advised to continue with yearly 
annual exams.   Safe sex practices discussed during office visit today.  Call if
any abnormal gynecologic issues during the next year, otherwise, RTC yearly or 
prn.



 

                                        kenalog 

                                        . Bronchitis - acute case of bronchitis 

identified.  Pt has been given 

antibiotics, breathing treatments as appropriate, and pt has been instructed to 
call if symptoms are not improved, or if symptoms acutely worsen.



 

                                        . URI -viral -flu swab to r/o flu - Pt a

dvised to increase fluids, vitamin C.  

Discussed natural and expected course of this diagnosis and need to alert me if 
symptoms do not follow expected course, or if any worse.



 

                                        . Post-surgical menopause - continue to 

taper depo dose and eventually off 

medication completely -injection today in the office. 



 

                                        tomorrow at 10:15 with the nurse practit

kennedy. Left knee, ankle, foot pain - 

will send RX - will refer to ortho - The pt is to use prn antiinflammatories to 
manage acute pain. The patient is to call the office if the pain is worsening or
does not improve. 





 

                                        . Left knee, ankle, foot pain - will sen

d RX - if no improvement will refer to 

ortho - The pt is to use prn antiinflammatories to manage acute pain. The 
patient is to call the office if the pain is worsening or does not improve. 



 

                                        . Well Adult - pt was counseled about di

et, exercise, and encouraged to follow a

heart healthy diet and increase activity level.  The patient was instructed to 
RTC yearly for well adult exams and PRN for acute illnesses.  The pt was also 
instructed to have yearly labs for check of cholesterol, thyroid, chem panel, 
CBC, and renal functioning.



Post-surgical menopause - I have advised the pt that we will look for her 
medications from [facility] to find out what her depo dose is so I can wean her off of 
it.



Anxiety - the patient has uncontrolled anxiety and will benefit from an SSRI on 
a daily basis to attempt control of the symptoms of anxiety (tachycardia, 
overwhelming sensations, stress, insomnia, etc). 

stop clonidine due to hypotension







Medical Equipment

No Medical Equipment data



Health Concerns Section

Health Concerns data not found



Goals Section

Goals data not found



Interventions Section

Interventions data not found



Health Status Evaluations/Outcomes Section

Health Status Evaluations/Outcomes data not found



Advance Directives

No Advance Directive data

## 2021-12-28 NOTE — OPERATIVE REPORT
DATE OF SERVICE:  12/28/2021



PREOPERATIVE DIAGNOSIS:

Dysphagia.



POSTOPERATIVE DIAGNOSES:

Reflux esophagitis.



PROCEDURES PERFORMED:

EGD with biopsies.



SURGEON:

Kim Ray DO.



ANESTHESIA:

Per CRNA.



ESTIMATED BLOOD LOSS:

None.



COMPLICATIONS:

None.



INDICATIONS FOR PROCEDURE:

The patient is a 51-year-old female having dysphagia symptoms.  She was

recommended to have EGD for further evaluation.  She understands the risks and

benefits and wishes to proceed.  Consent was signed in the chart.



DESCRIPTION OF PROCEDURE:

The patient was taken to the endoscopy suite and placed in a left lateral

recumbent position.  A timeout was performed.  Scope was inserted in the mouth,

down the esophagus, stomach and into the duodenum without difficulty.  There

were no polyps, masses or ulcerations in the duodenum.  Scope was slowly

retracted back into the stomach, where it was further insufflated.  No polyps,

masses or ulcerations in the stomach.  Biopsy of the antrum was obtained.  Scope

was retroflexed noting no other pathology.  Scope was returned to its normal

position, slowly withdrawn to the distal esophagus, slight changes of reflux

esophagitis were present.  Biopsy of the GE junction was obtained.  Scope was

then slowly retracted back until completely removed, noting no other pathology. 

The patient tolerated the procedure well without any complications.  She was

taken to the recovery room in stable condition.



RECOMMENDATIONS:

The patient will stop omeprazole and will be placed on Protonix 40 mg daily.  We

will see how her symptoms are doing at that time.  She will follow up in two

weeks.  Further recommendations pending biopsy results.





Job ID: [id number]

DocumentID: [id number]

Dictated Date:  12/28/2021 13:34:33

Transcription Date: 12/28/2021 20:12:59

Dictated By: KIM RAY DO

## 2021-12-28 NOTE — XMS REPORT
CCD document using C-CDA

                             Created on: 2021



AyalaRosibel vegas

External Reference #: [id number]

: 1970

Sex: Female



Demographics





                          Address                   [address]

[place] [id number]

 

                          Home Phone                [phone]

 

                          Preferred Language        Unknown

 

                          Marital Status            Unknown

 

                          Yazidi Affiliation     Unknown

 

                          Race                      White

 

                          Ethnic Group              Not  or 





Author





                          Author                    Rosibel Crump

 

                          Organization              Yesica Crump MD, KAREN

 

                          Address                   [address] [place]  [address]



 

                          Phone                     [phone]







Care Team Providers





                    Care Team Member Name Role                Phone

 

                    Yesica Crump     PP                  Unavailable

 

                          CCM                       Unavailable







Summary Purpose

Interface Exchange



Insurance Providers





             Payer name   Policy type / Coverage type Covered party ID Effective

 Begin Date 

Effective End Date

 

             [facility] Commercial Insurance [id number] 

Unknown      

Unknown







Family history





Mother



                          Diagnosis                 Age At Onset

 

                          Diabetes mellitus Type 2  Unknown

 

                          Alcoholism                Unknown

 

                          Depression                Unknown







Father



                          Diagnosis                 Age At Onset

 

                          Diabetes mellitus Type 2  Unknown







Social History





                Social History Element Codes           Description     Effective

 Dates

 

                    Marital status      Unknown             

Ha                                   08/15/2019

 

                Number of children Unknown         2               08/15/2019

 

                    Employment          Unknown             Currently employed

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

 

                Tobacco history SNOMED CT: 63798548 Current some days smoker 08/

15/2019

 

                Alcohol history SNOMED CT: 084497324 Never drinks alcohol 08/15/

2019







Allergies, Adverse Reactions, Alerts





           Substance  Reaction   Codes      Entered Date Inactivated Date Status

 

           Penicillin            Unknown    08/15/2019 No Inactive Date Active







Problems





                Condition       Codes           Effective Dates Condition Status

 

                          Acute bronchitis          ICD-10: J20.9

ICD-9: 466.0              2019                Active

 

                          Laryngitis                ICD-10: J04.0

ICD-9: 464.00             2021                Active

 

                          Other allergic rhinitis   ICD-10: J30.89

ICD-9: 477.8              2020                Active

 

                          Slow transit constipation ICD-10: K59.01

ICD-9: 564.01             2021                Active

 

                          Cough                     ICD-10: R05.9

ICD-9: 786.2              10/25/2021                Active

 

                          Encounter for screening mammogram for malignant neopla

sm of breast ICD-10: 

Z12.31

ICD-9: V76.12             11/10/2020                Active

 

                          Generalized anxiety disorder ICD-10: F41.9

ICD-9: 300.00             10/25/2021                Active

 

                          Menopausal and female climacteric states ICD-10: N95.1

ICD-9: 627.2              08/15/2019                Active

 

                          Encounter for general adult medical examination withou

t abnormal findings ICD-

10: Z00.00

ICD-9: V70.0              08/15/2019                Active

 

                          Screening, lipid          ICD-10: Z13.220

ICD-9: V77.91             10/22/2020                Active

 

                          Migraine without status migrainosus, not intractable, 

unspecified migraine type 

ICD-10: G43.909

ICD-9: 346.90             2020                Active

 

                          Headache                  ICD-10: R51

ICD-9: 784.0              2020                Active

 

                          Other acute sinusitis     ICD-10: J01.80

ICD-9: 461.8              2020                Active

 

                          Generalized anxiety disorder ICD-10: F41.1

ICD-9: 300.00             08/15/2019                Active

 

                                        Encounter for gynecological examination 

(general) (routine) without abnormal 

findings                                ICD-10: Z01.419

ICD-9: V72.31             2020                Active

 

                          Cough                     ICD-10: R05

ICD-9: 786.2              2019                Active

 

                          Body aches                ICD-10: R52

ICD-9: 780.96             2019                Active

 

                          Fever                     ICD-10: R50.9

ICD-9: 780.60             2019                Active

 

                          Pain in left ankle and joints of left foot ICD-10: M25

.572

ICD-9: 719.47             2019                Active

 

                          Pain in left knee         ICD-10: M25.562

ICD-9: 719.46             2019                Active







Medications





          Medication Codes     Instructions Start Date Stop Date Status    Fill 

Instructions

 

                    atorvastatin 20 mg tablet RxNorm: 644104      Take 1 Tablet(

s) Oral every night at 

bedtime         2021      Active           

 

                Miralax 17 gram/dose oral powder RxNorm: 805194  Take 1 scoop Or

al every day 

2021          12/15/2021          Active               

 

                    Allegra Allergy 180 mg tablet RxNorm: 167628      Take 1 Tab

let(s) Oral every day for

first 5 days take twice daily 2021      No Stop Date    Active           

 

                    omeprazole 20 mg tablet,delayed release RxNorm: 463833      

Take 1 Tablet(s) Oral 

every day       2021      Active           

 

                prednisone 20 mg tablet RxNorm: 007635  Take 2 Tablet(s) Oral ev

estevan day 

2021          Inactive             

 

                    azithromycin 250 mg tablet RxNorm: 718713      Take 1 Tablet

(s) Oral every day take 2

tablets day 1, then take 1 tablet daily on days 2-5 2021

21          

Inactive                                Please disregard Cefdinir RX.

 

                cefdinir 300 mg capsule RxNorm: 137288  Take 1 Capsule(s) Oral t

wo times a day 

2021          Inactive             

 

                    azithromycin 250 mg tablet RxNorm: 286269      Take 1 Tablet

(s) Oral every day take 2

tablets day 1, then take 1 tablet daily on days 2-5 2021

21          

Inactive                                Please disregard Cefdinir RX.

 

                    promethazine 6.25 mg-codeine 10 mg/5 mL syrup RxNorm: 264711

      Take 5 

Milliliter(s) Oral every 4-6 hours as needed cough 10/25/2021          10/25/202

1          

Inactive                                 

 

                prednisone 20 mg tablet RxNorm: 661753  Take 2 Tablet(s) Oral ev

estevan day 

10/25/2021          10/29/2021          Inactive             

 

                    promethazine 6.25 mg-codeine 10 mg/5 mL syrup RxNorm: 189091

      Take 5 

Milliliter(s) Oral every 4-6 hours as needed cough 10/25/2021          11/03/202

1          

Inactive                                 

 

                    Kenalog 40 mg/mL suspension for injection RxNorm: 9157769   

  Take 1 Milliliter(s) 

Injection       10/25/2021      10/25/2021      Inactive         

 

                    alprazolam 0.5 mg tablet RxNorm: 005543      Take 1 Tablet(s

) Oral two times a day as

needed FOR ANXIETY 10/15/2021      2021      Inactive         

 

                    alprazolam 0.5 mg tablet RxNorm: 971582      1 Tablet(s) Ora

l two times a day as 

needed anxiety  2021      Inactive         

 

                    Depo-Estradiol 5 mg/mL intramuscular oil RxNorm: 853702     

 0.75 Milliliter(s) 

Intramuscular   2021      Inactive         

 

                    alprazolam 0.5 mg tablet RxNorm: 802988      1 Tablet(s) Ora

l two times a day as 

needed anxiety  2021      Inactive         

 

                    Depo-Estradiol 5 mg/mL intramuscular oil RxNorm: 616172     

 0.75 Milliliter(s) 

Intramuscular   2021      Inactive         

 

                    alprazolam 0.5 mg tablet RxNorm: 672723      1 Tablet(s) Ora

l two times a day as 

needed anxiety  2021      Inactive         

 

                    alprazolam 0.5 mg tablet RxNorm: 063555      1 Tablet(s) Ora

l two times a day as 

needed anxiety  04/15/2021      2021      Inactive         

 

                    Depo-Provera 150 mg/mL intramuscular suspension RxNorm: 1000

128     Milliliter(s) 

Intramuscular   2021      Inactive         

 

                    escitalopram 10 mg tablet RxNorm: 025327      TAKE ONE TABLE

T BY MOUTH EVERY NIGHT AT

BEDTIME Tablet(s) Oral 2021      Inactive         

 

                    alprazolam 0.5 mg tablet RxNorm: 954845      1 Tablet(s) Ora

l two times a day as 

needed anxiety  2021      Inactive         

 

                    escitalopram 5 mg tablet RxNorm: 347712      TAKE ONE TABLET

 BY MOUTH EVERY NIGHT AT 

BEDTIME         02/15/2021      2021      Inactive         

 

                    alprazolam 0.5 mg tablet RxNorm: 306425      1 Tablet(s) Ora

l two times a day as 

needed anxiety  02/15/2021      2021      Inactive         

 

                atorvastatin 20 mg tablet RxNorm: 440304  TAKE ONE TABLET BY SHELLY

TH DAILY 

2021          Inactive             

 

                    Depo-Estradiol 5 mg/mL intramuscular oil RxNorm: 741637     

 3/4 Milliliter(s) 

Intramuscular monthly 2021      Inactive         

 

                    Depo-Provera 150 mg/mL intramuscular suspension RxNorm: 1000

128     Milliliter(s) 

Intramuscular   2021      Inactive         

 

                    alprazolam 0.5 mg tablet RxNorm: 034486      1 Tablet(s) Ora

l two times a day as 

needed anxiety  2020      Inactive         

 

                    acyclovir 400 mg tablet RxNorm: 257858      TAKE ONE TABLET 

BY MOUTH FOUR TIMES A DAY

                2020      Inactive         

 

                    alprazolam 0.5 mg tablet RxNorm: 696330      1 Tablet(s) Ora

l two times a day as 

needed anxiety  2020      12/15/2020      Inactive         

 

             atorvastatin 20 mg tablet RxNorm: 758801 1 Tablet(s) Oral every day

 2020                Inactive                   

 

             atorvastatin 20 mg tablet RxNorm: 673079 1 Tablet(s) Oral every day

 2020   

02/10/2021                Inactive                   

 

                    alprazolam 0.5 mg tablet RxNorm: 761653      1 Tablet(s) Ora

l two times a day as 

needed anxiety  10/23/2020      11/15/2020      Inactive         

 

                    Depo-Provera 150 mg/mL intramuscular suspension RxNorm: 1000

128     Milliliter(s) 

Intramuscular   10/20/2020      10/20/2020      Inactive         

 

                    acyclovir 400 mg tablet RxNorm: 346593      TAKE ONE TABLET 

BY MOUTH FOUR TIMES A DAY

                10/06/2020      11/15/2020      Inactive         

 

                    alprazolam 0.5 mg tablet RxNorm: 271167      1 Tablet(s) Ora

l two times a day as 

needed anxiety  2020      10/21/2020      Inactive         

 

                    alprazolam 0.5 mg tablet RxNorm: 644502      1 Tablet(s) Ora

l two times a day as 

needed anxiety  2020      Inactive         

 

                escitalopram 5 mg tablet RxNorm: 654735  1 Tablet(s) Oral every 

night at bedtime 

2020          Inactive             

 

             prednisone 20 mg tablet RxNorm: 281651 2 Tablet(s) Oral every day 0

2020                Inactive                   

 

                    Depo-Provera 150 mg/mL intramuscular suspension RxNorm: 1000

128     0.75 

Milliliter(s) Intramuscular 2020      Inactive         

 

                    Kenalog 40 mg/mL suspension for injection RxNorm: 2965925   

  1 Milliliter(s) 

Injection       2020      Inactive         

 

                Zithromax Z-Timur 250 mg tablet RxNorm: 000351  Tablet(s) Oral as 

directed 

2020          Inactive             

 

                    alprazolam 0.5 mg tablet RxNorm: 665795      1 Tablet(s) Ora

l two times a day as 

needed anxiety  2020      Inactive         

 

                    alprazolam 0.5 mg tablet RxNorm: 129727      1 Tablet(s) Ora

l two times a day as 

needed anxiety  2020      Inactive         

 

                    Depo-Provera 150 mg/mL intramuscular suspension RxNorm: 1000

128     Milliliter(s) 

Intramuscular   2020      Inactive         

 

                    acyclovir 400 mg tablet RxNorm: 760353      1 Tablet(s) Oral

 four times a day fever 

blisters        2020      Inactive         

 

                    Depo-Provera 150 mg/mL intramuscular suspension RxNorm: 1000

128     Milliliter(s) 

Intramuscular   2020      Inactive         

 

                pravastatin 10 mg tablet RxNorm: 638124  1 Tablet(s) Oral every 

night at bedtime 

2020          Inactive             

 

                pravastatin 10 mg tablet RxNorm: 228198  1 Tablet(s) Oral every 

night at bedtime 

2020          Inactive             

 

                    Depo-Estradiol 5 mg/mL intramuscular oil RxNorm: 863091     

 3/4 Milliliter(s) 

Intramuscular monthly 2020      Inactive         

 

                    Kenalog 40 mg/mL suspension for injection RxNorm: 5300225   

  Milliliter(s) 

Injection       2019      Inactive         

 

                    Phenergan with Codeine Syrup RxNorm:             5-10 Millil

iter(s) Oral Every 6 hrs as 

needed          2019      Inactive         

 

             prednisone 20 mg tablet RxNorm: 306374 2 Tablet(s) Oral every day 1

2019                Inactive                  start tomorrow

 

                    doxycycline hyclate 100 mg tablet RxNorm: 9147450     1 Tabl

et(s) Oral two times a 

day             2019      Inactive        start if symptom

s do not improve

 

                    Depo-Estradiol 5 mg/mL intramuscular oil RxNorm: 823948     

 3/4 Milliliter(s) 

Intramuscular monthly 2019      Inactive         

 

                    Depo-Estradiol 5 mg/mL intramuscular oil RxNorm: 726401     

 3/4 Milliliter(s) 

Intramuscular monthly 10/31/2019      2019      Inactive         

 

                    gabapentin 100 mg capsule RxNorm: 752773      1 Capsule(s) O

ral every night at 

bedtime         10/16/2019      2019      Inactive         

 

             gabapentin 100 mg capsule RxNorm: 500709 1 Capsule(s) PO QHS 09/12/

2019   

10/11/2019                Inactive                   

 

           naproxen 500 mg tablet RxNorm: 733848 1 Tablet(s) PO BID 2019 0

2019 

Inactive                                 

 

             escitalopram 5 mg tablet RxNorm: 829125 1 Tablet(s) PO QHS 08/15/20

19   2019

                          Inactive                   

 

             ranitidine 150 mg capsule RxNorm: 571309 1 Capsule(s) PO BID 2019                Inactive                   

 

             eszopiclone 1 mg tablet RxNorm: 976167 Tablet(s) PO as needed 2020                Inactive                   

 

                acyclovir 400 mg tablet RxNorm: 985104  1 Tablet(s) PO PRN fever

 blisters 

2020          Inactive             

 

             clonidine HCl 0.1 mg tablet RxNorm: 964033 Tablet(s) PO as needed 0

8/15/2019   

2019                Inactive                   







Medication Administered





             Medication   Codes        Instructions Start Date   Status

 

                Kenalog 40 mg/mL suspension for injection RxNorm: 2597683 1Milli

liter     10/25/2021

                                        No longer Active

 

                Depo-Estradiol 5 mg/mL intramuscular oil RxNorm: 820461  0.75Mil

liliter  

2021                              No longer Active

 

                Depo-Estradiol 5 mg/mL intramuscular oil RxNorm: 946462  0.75Mil

liliter  

2021                              No longer Active

 

                Depo-Provera 150 mg/mL intramuscular suspension RxNorm: 4529202 

Milliliter      

2021                              No longer Active

 

                Depo-Provera 150 mg/mL intramuscular suspension RxNorm: 3911737 

Milliliter      

2021                              No longer Active

 

                Depo-Provera 150 mg/mL intramuscular suspension RxNorm: 2122151 

Milliliter      

10/20/2020                              No longer Active

 

                Depo-Provera 150 mg/mL intramuscular suspension RxNorm: 9451248 

0.75Milliliter  

2020                              No longer Active

 

                Kenalog 40 mg/mL suspension for injection RxNorm: 7677230 1Milli

liter     2020

                                        No longer Active

 

                Depo-Provera 150 mg/mL intramuscular suspension RxNorm: 5623552 

Milliliter      

2020                              No longer Active

 

                Depo-Provera 150 mg/mL intramuscular suspension RxNorm: 0089396 

Milliliter      

2020                              No longer Active

 

                Depo-Estradiol 5 mg/mL intramuscular oil RxNorm: 664094  3/4Mill

ilitermonthly 

2020                              Active

 

             Kenalog 40 mg/mL suspension for injection RxNorm: 7684774 Millilite

r   2019   

No longer Active

 

                Depo-Estradiol 5 mg/mL intramuscular oil RxNorm: 833107  3/4Mill

ilitermonthly 

2019                              Active







Immunizations

No Immunization data



Results





          Observation Observation Code Item      Item Code Result    Date      S

[facility] Location

 

          Cbc With Differential Ord2      WBC                 11.77 K/ul 

021 Unknown

 

          Cbc With Differential Ord2      RBC                 4.50 M/ul 20

21 Unknown

 

          Cbc With Differential Ord2      HGB                 13.6 g/dl 20

21 Unknown

 

          Cbc With Differential Ord2      Neut%               69.1 %    20

21 Unknown

 

          Cbc With Differential Ord2      HCT                 43.0 %    20

21 Unknown

 

          Cbc With Differential Ord2      MCV                 95.6 fl   20

21 Unknown

 

          Cbc With Differential Ord2      Lymph%              22.0 %    20

21 Unknown

 

          Cbc With Differential Ord2      MCH                 30.2 pg   20

21 Unknown

 

          Cbc With Differential Ord2      Mono%               6.0 %     20

21 Unknown

 

          Cbc With Differential Ord2      Eos%                2.6 %     20

21 Unknown

 

          Cbc With Differential Ord2      MCHC                31.6 pg   20

21 Unknown

 

          Cbc With Differential Ord2      PLT                 288 K/ul  20

21 Unknown

 

          Cbc With Differential Ord2      Baso%               0.3 %     20

21 Unknown

 

          Cbc With Differential Ord2      RDW                 13.5 %    20

21 Unknown

 

          Cbc With Differential Ord2      Neut ABS#           8.13 K/ul 20

21 Unknown

 

          Cbc With Differential Ord2      Lymph ABS#           2.59 K/ul 

021 Unknown

 

          Cbc With Differential Ord2      Mono ABS#           0.7 K/ul  20

21 Unknown

 

          Cbc With Differential Ord2      Eos ABS#            0.3 K/ul  20

21 Unknown

 

          Cbc With Differential Ord2      Baso ABS#           0.0 K/ul  20

21 Unknown

 

          Comp Metabolic Jsg356    NA                  142 mEq/L 2021 Unkn

own

 

          Comp Metabolic Jle117    K                   4.4 mEq/L 2021 Unkn

own

 

          Comp Metabolic Eaj296    CL                  101 mEq/L 2021 Unkn

own

 

          Comp Metabolic Ikm266    CO2                 32.0 mEq/L 2021 Unk

nown

 

          Comp Metabolic Svr479    ANION GAP           13        2021 Unkn

own

 

          Comp Metabolic Xmi779    GLUCOSE             89 mg/dL  2021 Unkn

own

 

          Comp Metabolic Kck535    Creat               0.7 mg/dL 2021 Unkn

own

 

          Comp Metabolic Hog335    eGFR                90 ml/min/1.73m2 20

21 Unknown

 

          Comp Metabolic Urz541    BUN                 19 mg/dL  2021 Unkn

own

 

          Comp Metabolic Xah579    B/C Ratio           26.4 Ratio 2021 Unk

nown

 

          Comp Metabolic Ymr521    CALCIUM             9.2 mg/dL 2021 Unkn

own

 

          Comp Metabolic Kic390    ALK PHOS            85 U/L    2021 Unkn

own

 

          Comp Metabolic Oxu756    AST(SGOT)           16 U/L    2021 Unkn

own

 

          Comp Metabolic Zdu952    ALT(SGPT)           19 U/L    2021 Unkn

own

 

          Comp Metabolic Aol900    BILI T              0.6 mg/dL 2021 Unkn

own

 

          Comp Metabolic Amw458    ALBUMIN             4.2 g/dL  2021 Unkn

own

 

          Comp Metabolic Fqf342    TPRO                7.1 g/dL  2021 Unkn

own

 

          Comp Metabolic Eqy420    GLOB                2.9 g/dL  2021 Unkn

own

 

          Comp Metabolic Ctt177    A/G Ratio           1.5 Ratio 2021 Unkn

own

 

          Comp Metabolic Tol946    Osmo                285 mOsmo 2021 Unkn

own

 

          Tsh       Ord6      TSH (3rd IS)           1.14 uIU/mL 2021 Unkn

own

 

          Lipid     Ord30     CHOL                233 mg/dL 2021 Unknown

 

          Lipid     Ord30     HDL                 54.0 mg/dl 2021 Unknown

 

          Lipid     Ord30     TRIG                101 mg/dL 2021 Unknown

 

          Lipid     Ord30     LDL                 159 mg/dL 2021 Unknown

 

          Lipid     Ord30     C/HDL               4.3 Ratio 2021 Unknown

 

          Lipid     Ord30     CHOL                249 mg/dL 10/22/2020 Unknown

 

          Lipid     Ord30     HDL                 55.0 mg/dl 10/22/2020 Unknown

 

          Lipid     Ord30     TRIG                82 mg/dL  10/22/2020 Unknown

 

          Lipid     Ord30     LDL                 178 mg/dL 10/22/2020 Unknown

 

          Lipid     Ord30     C/HDL               4.5 Ratio 10/22/2020 Unknown

 

          Comp Metabolic Owm885    NA                  138 mEq/L 10/22/2020 Unkn

own

 

          Comp Metabolic Goj314    K                   4.5 mEq/L 10/22/2020 Unkn

own

 

          Comp Metabolic Oyj583    CL                  103 mEq/L 10/22/2020 Unkn

own

 

          Comp Metabolic Qfj691    CO2                 28.0 mEq/L 10/22/2020 Unk

nown

 

          Comp Metabolic Nxq475    ANION GAP           12        10/22/2020 Unkn

own

 

          Comp Metabolic Mmp368    GLUCOSE             90 mg/dL  10/22/2020 Unkn

own

 

          Comp Metabolic Ttz713    Creat               0.7 mg/dL 10/22/2020 Unkn

own

 

          Comp Metabolic Nhp350    eGFR                88 ml/min/1.73m2 10/22/20

20 Unknown

 

          Comp Metabolic Elj776    BUN                 20 mg/dL  10/22/2020 Unkn

own

 

          Comp Metabolic Gxr597    B/C Ratio           27.0 Ratio 10/22/2020 Unk

nown

 

          Comp Metabolic Sms552    CALCIUM             9.5 mg/dL 10/22/2020 Unkn

own

 

          Comp Metabolic Xmk145    ALK PHOS            87 U/L    10/22/2020 Unkn

own

 

          Comp Metabolic Wcz389    AST(SGOT)           17 U/L    10/22/2020 Unkn

own

 

          Comp Metabolic Ixj281    ALT(SGPT)           20 U/L    10/22/2020 Unkn

own

 

          Comp Metabolic Chm865    BILI T              0.6 mg/dL 10/22/2020 Unkn

own

 

          Comp Metabolic Rwz255    ALBUMIN             4.5 g/dL  10/22/2020 [facility]

own

 

          Comp Metabolic Ljl943    TPRO                7.6 g/dL  10/22/2020 [facility]

own

 

          Comp Metabolic Zdp961    GLOB                3.1 g/dL  10/22/2020 [facility]

own

 

          Comp Metabolic Cne400    A/G Ratio           1.5 Ratio 10/22/2020 [facility]

own

 

          Comp Metabolic Zet181    Osmo                278 mOsmo 10/22/2020 [facility]

own

 

          Cbc With Differential Ord2      WBC                 10.56 K/ul 10/22/2

020 Unknown

 

          Cbc With Differential Ord2      RBC                 4.44 M/ul 10/22/20

20 Unknown

 

          Cbc With Differential Ord2      HGB                 14.0 g/dl 10/22/20

20 Unknown

 

          Cbc With Differential Ord2      HCT                 42.8 %    10/22/20

20 Unknown

 

          Cbc With Differential Ord2      Neut%               73.9 %    10/22/20

20 Unknown

 

          Cbc With Differential Ord2      MCV                 96.4 fl   10/22/20

20 Unknown

 

          Cbc With Differential Ord2      Lymph%              18.2 %    10/22/20

20 Unknown

 

          Cbc With Differential Ord2      MCH                 31.5 pg   10/22/20

20 Unknown

 

          Cbc With Differential Ord2      Mono%               5.4 %     10/22/20

20 Unknown

 

          Cbc With Differential Ord2      MCHC                32.7 pg   10/22/20

20 Unknown

 

          Cbc With Differential Ord2      Eos%                2.2 %     10/22/20

20 Unknown

 

          Cbc With Differential Ord2      PLT                 258 K/ul  10/22/20

20 Unknown

 

          Cbc With Differential Ord2      Baso%               0.3 %     10/22/20

20 Unknown

 

          Cbc With Differential Ord2      RDW                 13.4 %    10/22/20

20 Unknown

 

          Cbc With Differential Ord2      Neut ABS#           7.81 K/ul 10/22/20

20 Unknown

 

          Cbc With Differential Ord2      Lymph ABS#           1.92 K/ul 10/22/2

020 Unknown

 

          Cbc With Differential Ord2      Mono ABS#           0.6 K/ul  10/22/20

20 Unknown

 

          Cbc With Differential Ord2      Eos ABS#            0.2 K/ul  10/22/20

20 Unknown

 

          Cbc With Differential Ord2      Baso ABS#           0.0 K/ul  10/22/20

20 Unknown

 

          Tsh       Ord6      TSH (3rd IS)           1.38 uIU/mL 2020 [facility]

[facility]

 

          Comp Metabolic Juh857    NA                  141 mEq/L 2020 [facility]

own

 

          Comp Metabolic Ics875    K                   4.3 mEq/L 2020 [facility]

own

 

          Comp Metabolic Elu921    CL                  104 mEq/L 2020 [facility]

[facility]

 

          Comp Metabolic Ywv490    CO2                 29.0 mEq/L 2020 [facility]

nown

 

          Comp Metabolic Lxx736    ANION GAP           12        2020 [facility]

own

 

          Comp Metabolic Ekg734    GLUCOSE             83 mg/dL  2020 [facility]

own

 

          Comp Metabolic Ozc202    Creat               0.7 mg/dL 2020 [facility]

[facility]

 

          Comp Metabolic Qnk431    eGFR                102 ml/min/1.73m2 

020 Unknown

 

          Comp Metabolic Yyi116    BUN                 16 mg/dL  2020 [facility]

own

 

          Comp Metabolic Yoy291    B/C Ratio           24.6 Ratio 2020 [facility]

[facility]

 

          Comp Metabolic Ndl014    CALCIUM             9.3 mg/dL 2020 [facility]

[facility]

 

          Comp Metabolic Uuy615    ALK PHOS            79 U/L    2020 [facility]

own

 

          Comp Metabolic Mxm405    AST(SGOT)           19 U/L    2020 [facility]

own

 

          Comp Metabolic Vjb757    ALT(SGPT)           26 U/L    2020 [facility]

own

 

          Comp Metabolic Hdb044    BILI T              0.5 mg/dL 2020 [facility]

own

 

          Comp Metabolic Jcx224    ALBUMIN             4.2 g/dL  2020 [facility]

own

 

          Comp Metabolic Mcq399    TPRO                7.0 g/dL  2020 [facility]

own

 

          Comp Metabolic Dti268    GLOB                2.8 g/dL  2020 [facility]

own

 

          Comp Metabolic Qxb101    A/G Ratio           1.5 Ratio 2020 [facility]

own

 

          Comp Metabolic Kii702    Osmo                282 mOsmo 2020 [facility]

own

 

          Cbc With Differential Ord2      WBC                 8.94 K/ul 20

20 Unknown

 

          Cbc With Differential Ord2      RBC                 4.34 M/ul 20

20 Unknown

 

          Cbc With Differential Ord2      HGB                 13.3 g/dl 20

20 Unknown

 

          Cbc With Differential Ord2      HCT                 40.7 %    20

20 Unknown

 

          Cbc With Differential Ord2      Neut%               71.8 %    20

20 Unknown

 

          Cbc With Differential Ord2      MCV                 93.8 fl   20

20 Unknown

 

          Cbc With Differential Ord2      Lymph%              19.5 %    20

20 Unknown

 

          Cbc With Differential Ord2      MCH                 30.6 pg   20

20 Unknown

 

          Cbc With Differential Ord2      Mono%               6.2 %     20

20 Unknown

 

          Cbc With Differential Ord2      MCHC                32.7 pg   20

20 Unknown

 

          Cbc With Differential Ord2      Eos%                2.2 %     20

20 Unknown

 

          Cbc With Differential Ord2      PLT                 262 K/ul  20

20 Unknown

 

          Cbc With Differential Ord2      Baso%               0.3 %     20

20 Unknown

 

          Cbc With Differential Ord2      Neut ABS#           6.42 K/ul 20

20 Unknown

 

          Cbc With Differential Ord2      RDW                 13.4 %    20

20 Unknown

 

          Cbc With Differential Ord2      Lymph ABS#           1.74 K/ul 

020 Unknown

 

          Cbc With Differential Ord2      Mono ABS#           0.6 K/ul  20

20 Unknown

 

          Cbc With Differential Ord2      Eos ABS#            0.2 K/ul  20

20 Unknown

 

          Cbc With Differential Ord2      Baso ABS#           0.0 K/ul  20

20 Unknown

 

          Lipid     Ord30     CHOL                240 mg/dL 2020 Unknown

 

          Lipid     Ord30     HDL                 57.0 mg/dl 2020 Unknown

 

          Lipid     Ord30     TRIG                80 mg/dL  2020 Unknown

 

          Lipid     Ord30     LDL                 167 mg/dL 2020 Unknown

 

          Lipid     Ord30     C/HDL               4.2 Ratio 2020 Unknown

 

          C A/B FLU [id number]   Influenza A Scr           Negative  2019 [facility]

[facility]

 

          C A/B FLU [id number]   Influenza B Scr           Negative  2019 [facility]

[facility]

 

           C A/B FLU  [id number]    Influenza Intrp B AG:PRID:PT:NOSE:NOM:IF       

     See Footnote  

2019                              Unknown







Procedures





                    Procedure           Codes               Date

 

                    THER/PROPH/DIAG INJ SC/IM CPT-4: 26328        2021

 

                    THER/PROPH/DIAG INJ SC/IM CPT-4: 23391        2021

 

                    THER/PROPH/DIAG INJ SC/IM CPT-4: 36376        2021

 

                    THER/PROPH/DIAG INJ SC/IM CPT-4: 23248        2021

 

                    THER/PROPH/DIAG INJ SC/IM CPT-4: 94348        10/20/2020

 

                    THER/PROPH/DIAG INJ SC/IM CPT-4: 56696        2020

 

                    TRIAMCINOLONE ACET INJ NOS 10 mg CPT-4:         

020

 

                    THER/PROPH/DIAG INJ SC/IM CPT-4: 56112        2020

 

                    THER/PROPH/DIAG INJ SC/IM CPT-4: 99788        2020

 

                    THER/PROPH/DIAG INJ SC/IM CPT-4: 14502        2020

 

                    TRIAMCINOLONE ACET INJ NOS 10 mg CPT-4:         

019

 

                    THER/PROPH/DIAG INJ SC/IM CPT-4: 60872        2019







Vital Signs





                          Date                      Vital

 

                2021      Blood Pressure 1: 156/82 Code: 8480-6 Heart Rate

 1: 81 bpm Height: 

5'2" Code: 8302-2   Height: 5'2" Code: 8302-2 SpO2: 99%           Temperature: 3

6.2 (C) / 

97.1 (F)                                Weight:  Code: 94402-7

 

                10/25/2021      Blood Pressure 1: 142/68 Code: 8480-6 BMI: 30.7 

Code: 65475-6 Heart 

Rate 1: 63 bpm      Height: 5'2" Code: 8302-2 SpO2: 97%           Temperature: 3

6.4 (C) / 97.6 

(F)                                     Weight: 168 lbs  Code: 72442-6

 

                2020      Blood Pressure 1: 130/80 Code: 8480-6 BMI: 28.5 

Code: 76313-4 Heart 

Rate 1: 69 bpm      Height: 5'2" Code: 8302-2 SpO2: 97%           Temperature: 3

6.9 (C) / 98.4 

(F)                                     Weight: 156 lbs  Code: 54529-3

 

                2020      Blood Pressure 1: 128/78 Code: 8480-6 Heart Rate

 1: 74 bpm Height:  

Code: 8302-2              SpO2: 98%                 Weight:  Code: 77796-8

 

                    2020          Height:  Code: 8302-2 Weight:  Code: 294

63-7

 

                2020      Blood Pressure 1: 132/80 Code: 8480-6 BMI: 28.9 

Code: 44546-2 Heart 

Rate 1: 68 bpm      Height: 5'2" Code: 8302-2 SpO2: 97%           Weight: 158 lb

s  Code: 

63110-6

 

                2019      Blood Pressure 1: 110/60 Code: 8480-6 BMI: 28.5 

Code: 03963-1 Heart 

Rate 1: 76 bpm      Height: 5'2" Code: 8302-2 SpO2: 98%           Temperature: 3

6.9 (C) / 98.5 

(F)                                     Weight: 156 lbs  Code: 53266-8

 

             2019   Blood Pressure 1: 124/80 Code: 8480-6 Heart Rate 1: 81

 bpm SpO2: 98%    

Temperature: 36.7 (C) / 98.1 (F)

 

                2019      Blood Pressure 1: 130/78 Code: 8480-6 BMI: 28.0 

Code: 09035-3 Heart 

Rate 1: 68 bpm      Height: 5'2" Code: 8302-2 SpO2: 98%           Weight: 153 lb

s  Code: 

57851-5

 

                2019      Heart Rate 1: 68 bpm Height:  Code: 8302-2 Weigh

t:  Code: 84059-3

 

                2019      Blood Pressure 1: 132/74 Code: 8480-6 BMI: 27.4 

Code: 48744-0 Heart 

Rate 1: 66 bpm      Height: 5'2" Code: 8302-2 SpO2: 98%           Weight: 150 lb

s  Code: 

62929-3

 

                08/15/2019      Blood Pressure 1: 104/60 Code: 8480-6 BMI: 27.5 

Code: 44349-8 Heart 

Rate 1: 66 bpm      Height: 5'2" Code: 8302-2 SpO2: 98%           Weight: 150 lb

s 5 oz Code: 

00839-6







Functional Status

No Functional Status data



Reason For Visit





                    Reason For Visit    Effective Dates     Notes

 

                    cough               2021           

 

                    sinus congestion    10/25/2021           

 

                    headache            2020           

 

                    headache            2020           

 

                    well woman exam (40-65 years) 2020           

 

                    cough               2019           

 

                    fever               2019           

 

                    menopausal symptoms 2019           

 

                    lower leg pain      2019           

 

                    lower leg pain      2019           

 

                    anxiety             08/15/2019           







Encounters





             Encounter    Performer    Location     Codes        Date

 

                                        [id number]) 54739 EST. PATIENT, LEVEL IV

Diagnosis: Acute bronchitis[ICD10: J20.9]

Diagnosis: Laryngitis[ICD10: J04.0]

Diagnosis: Other allergic rhinitis[ICD10: J30.89]

Diagnosis: Slow transit constipation[ICD10: K59.01] [facility]                   CPT-4: 69813              2021

 

                                        [id number]) 84368 EST. PATIENT, LEVEL IV

Diagnosis: Generalized anxiety disorder[ICD10: F41.9]

Diagnosis: Cough[ICD10: R05.9]

Diagnosis: Other allergic rhinitis[ICD10: J30.89]

Diagnosis: Encounter for screening mammogram for malignant neoplasm of 
breast[ICD10: Z12.31] [facility] CPT-4: 53967    

10/25/2021

 

                                        [id number]) 87000 EST. PATIENT, LEVEL III

Diagnosis: Headache[ICD10: R51]

Diagnosis: Other allergic rhinitis[ICD10: J30.89] Bree Gutierres MD, [facility]          CPT-4: 00639              2020

 

                                        07269 EST. PATIENT, LEVEL III

Diagnosis: Other acute sinusitis[ICD10: J01.80]

Diagnosis: Other allergic rhinitis[ICD10: J30.89]

Diagnosis: Migraine without status migrainosus, not intractable, unspecified 
migraine type[ICD10: G43.909]

Diagnosis: Menopausal and female climacteric states[ICD10: N95.1] Flakita Crump MD, [facility]    CPT-4: 24154              2020

 

                                        [phone]) 76389 EST. PATIENT, LEVEL III

Diagnosis: Generalized anxiety disorder[ICD10: F41.1] Yesica Crump MD, [facility]          CPT-4: 23524              2020

 

                                        ([id number]) PREV VISIT EST AGE 40-64

Diagnosis: Encounter for gynecological examination (general) (routine) without 
abnormal findings[ICD10: Z01.419] Bree Crump MD, [facility] CPT

-4:

76829                                   2020

 

                                        [phone]) 17253 EST. PATIENT, LEVEL III

Diagnosis: Cough[ICD10: R05]

Diagnosis: Acute bronchitis[ICD10: J20.9] Bree carrasco MD, 

[facility]                       CPT-4: 07575              2019

 

                                        [phone]) 39515 EST. PATIENT, LEVEL III

Diagnosis: Fever[ICD10: R50.9]

Diagnosis: Body aches[ICD10: R52] Bree Crump MD, [facility] CPT

-4:

87208                                   2019

 

                                        [phone]) 84996 EST. PATIENT, LEVEL III

Diagnosis: Menopausal and female climacteric states[ICD10: N95.1] Bree Crump MD, [facility] CPT-4: 87010        2019

 

                                        63121 EST. PATIENT, LEVEL III

Diagnosis: Pain in left ankle and joints of left foot[ICD10: M25.572]

Diagnosis: Pain in left knee[ICD10: M25.562] [facility]

                          CPT-4: 21211              2019

 

                                        93580 EST. PATIENT, LEVEL III

Diagnosis: Pain in left ankle and joints of left foot[ICD10: M25.572]

Diagnosis: Pain in left knee[ICD10: M25.562] [facility]

                          CPT-4: 10925              2019

 

                                        ([id number]) PREV VISIT NEW AGE 40-64

Diagnosis: Encounter for general adult medical examination without abnormal 
findings[ICD10: Z00.00] [facility] CPT-4: 79393    

08/15/2019







Plan of Care





             Planned Activity Notes        Codes        Status       Date

 

                          Visit Plan:               Allergies - recent increase 

in nasal congestion that has been 

worsening cough. Has tried Claritin in the past without any relief. Discussed 
use of being aggressive with allergies to help decrease cough and loss of voice.
Discussed use of Allegra twice a day x 5 days then decrease down to daily. BP 
elevated the last 2 visits, so discouraged use of Sudafed. Discussed us of 
Cloricidin HP as needed for congestions. Laryngitis - secondary to acute 
bronchitis that has worsened over the last month. Cough has improved, but pt 
reports tightness when lying down. Discussed possible silent GERD due to increas
e in coughing. Recommended taking Omeprazole daily x 2 weeks. Acute Bronchitis -
cough improved, denies chest congestion. Completed 2 rounds of antibiotics and 2
rounds of steroids. Discussed treat more aggressive with allergies and GERD. Pt 
encouraged to follow up in 2 weeks if no improvement. Slow transit constipation 
- encouraged use Miralax 1 capful daily, may titrate based on needed. Allergies 
exacerbation with cough - chronic - recommended pt to use allergy medication as 
prescribed. Pt has been counseled as to the appropriate use of the medication. 
Pt to call if allergy symptoms are not controlled with the medication. If using 
nasal spray, instructions as follows: Nasal spray- use twice daily, one spray 
per nostril twice daily, after 30 minutes, rinse out nose with saline spray.. 
Use opposite hand per nostril to spray in the nasal steroid allergy spray. 
Kenalog injection given at visit. Prednisone rx sent. Phenergan with codeine 
cough medicine sent. Screening mammogram - orders sent. Anxiety - controlled. 
Continue prn alprazolam at bedtime and escitalopram. Fasting lab orders sent

                                                            2021

 

                                        Appointment: Eulalia Osman 

WPtel:[phone]

                                        [address] [facility]

                                              (30 min) Complex 2021

 

             Patient Education: Patient Medication Summary                      

     Completed    2021

 

             Patient Education: Patient Medication Summary                      

     Completed    2021

 

                          Visit Plan:               Allergies exacerbation with 

cough - chronic - recommended pt to use

allergy medication as prescribed. Pt has been counseled as to the appropriate 
use of the medication. Pt to call if allergy symptoms are not controlled with 
the medication. If using nasal spray, instructions as follows: Nasal spray- use 
twice daily, one spray per nostril twice daily, after 30 minutes, rinse out nose
with saline spray.. Use opposite hand per nostril to spray in the nasal steroid 
allergy spray. Kenalog injection given at visit. Prednisone rx sent. Phenergan 
with codeine cough medicine sent. Screening mammogram - orders sent. Anxiety - 
controlled. Continue prn alprazolam at bedtime and escitalopram. Fasting lab 
orders sent

                                                            10/25/2021

 

                                        Appointment: Eulalia Osman 

WPtel:[phone]

                                        [address] [facility] [place]

                                              (30 min) Complex 10/25/2021

 

             Patient Education: Patient Medication Summary                      

     Completed    10/25/2021

 

             Patient Education: prednisone- [employer] [id number]         

                  Completed    

10/25/2021

 

             Appointment:                            Injection    2021

 

             Patient Education: Patient Medication Summary                      

     Completed    2021

 

             Appointment:                            Injection    2021

 

             Patient Education: Patient Medication Summary                      

     Completed    2021

 

             Appointment:                            Injection    2021

 

             Appointment:                            Injection    2021

 

             Patient Education: Patient Medication Summary                      

     Completed    2021

 

             Appointment:                            Injection    2021

 

             Patient Education: Patient Medication Summary                      

     Completed    2021

 

             Patient Education: Patient Medication Summary                      

     Completed    11/10/2020

 

             Patient Education: Patient Medication Summary                      

     Completed    10/22/2020

 

             Appointment:                            Injection    10/20/2020

 

             Patient Education: Patient Medication Summary                      

     Completed    10/20/2020

 

                          Visit Plan:               Allergies - chronic - recomm

ended pt to use allergy medication as 

prescribed. Pt has been counseled as to the appropriate use of the medication. 
Pt to call if allergy symptoms are not controlled with the medication. If using 
nasal spray, instructions as follows: Nasal spray- use twice daily, one spray 
per nostril twice daily, after 30 minutes, rinse out nose with saline spray.. 
Use opposite hand per nostril to spray in the nasal steroid allergy spray. 
Headache -will treat allergies/congestion - instructed patient to call if does 
not improve and we will order CT Head without contrast.

                                                            2020

 

                                        Appointment: Bree Oleary 

WPtel:[phone]

                                        [facility] [place] [address]                                              (15 min) Moderate 2020

 

             Patient Education: Patient Medication Summary                      

     Completed    2020

 

                          Visit Plan:               Sinusitis - Pt has acute inf

ection - pain in face, maxillary 

region, Pt informed to use decongestant, RX given to patient, sinus rinses also 
recommended. Call if symptoms do not show improvement. Allergies - chronic - 
recommended pt to use allergy medication as prescribed. Pt has been counseled as
to the appropriate use of the medication. Pt to call if allergy symptoms are not
controlled with the medication. If using nasal spray, instructions as follows: 
Nasal spray- use twice daily, one spray per nostril twice daily, after 30 
minutes, rinse out nose with saline spray.. Use opposite hand per nostril to 
spray in the nasal steroid allergy spray. Migraine - will treat for sinus 
infection - pt is to notify clinic if symptoms do not improve, if they worsen, 
or with any changes, questions, or concerns.

                                                            2020

 

                                        Appointment: Flakita Castro 

WPtel:[phone]

                                        [facility]

                                              (30 min) Complex 2020

 

             Patient Education: Patient Medication Summary                      

     Completed    2020

 

                          Visit Plan:               Anxiety - the patient has un

controlled anxiety and will benefit 

from a short term dose of xanax for control of symptoms.

                                                            2020

 

                                        Appointment: Yesica Crump 

WPtel:[phone]

                                        [facility]

                                              TeleHealth      2020

 

             Patient Education: Patient Medication Summary                      

     Completed    2020

 

             Appointment:                            Injection    2020

 

             Patient Education: Patient Medication Summary                      

     Completed    2020

 

             Appointment:                            Injection    2020

 

             Appointment:                            Injection    2020

 

             Patient Education: Patient Medication Summary                      

     Completed    2020

 

                                        Appointment: Yesica Crump 

WPtel:[phone]

                                        [facility]

                                              Well Woman      03/10/2020

 

                          Visit Plan:               Well Adult Female - exam com

pleted. Pap and breast exam completed. 

Pt will be called with results of her testing. She was advised to continue with 
yearly annual exams. Safe sex practices discussed during office visit today. 
Call if any abnormal gynecologic issues during the next year, otherwise, RTC 
yearly or prn.

                                                            2020

 

                                        Appointment: Bree Oleary 

WPtel:[phone]

                                        [facility]

                                              Well Woman      2020

 

             Patient Education: Patient Medication Summary                      

     Completed    2020

 

                                        Appointment: Yesica Crump 

WPtel:[phone]

                                        [facility] [facility]

                                              (15 min) Moderate 2020

 

             Appointment:                            Injection    2020

 

             Patient Education: Patient Medication Summary                      

     Completed    2020

 

                          Visit Plan:               Bronchitis - acute case of b

ronchitis identified. Pt has been given

antibiotics, breathing treatments as appropriate, and pt has been instructed to 
call if symptoms are not improved, or if symptoms acutely worsen.

                                                            2019

 

                                        Appointment: Bree Oleary 

WPtel:[phone]

                                        [facility] [facility]

                                              (15 min) Moderate 2019

 

             Patient Education: Patient Medication Summary                      

     Completed    2019

 

                          Visit Plan:               URI -viral -flu swab to r/o 

flu - Pt advised to increase fluids, 

vitamin C. Discussed natural and expected course of this diagnosis and need to 
alert me if symptoms do not follow expected course, or if any worse.

                                                            2019

 

                                        Appointment: Bree Oleary 

WPtel:[phone]

                                        [facility]

                                              (30 min) Complex 2019

 

             Patient Education: Patient Medication Summary                      

     Completed    2019

 

                          Visit Plan:               Post-surgical menopause - co

ntinue to taper depo dose and 

eventually off medication completely -injection today in the office.

                                                            2019

 

                                        Appointment: Bree Oleary 

WPtel:[phone]

                                        [facility] [facility]

[place]                                              (15 min) Moderate 2019

 

             Patient Education: Patient Medication Summary                      

     Completed    2019

 

                                        Appointment: Bree Oleary 

WPtel:[phone]

                                        [facility]

[place]                                              (30 min) Complex 2019

 

                          Visit Plan:               Left knee, ankle, foot pain 

- will send RX - will refer to ortho - 

The pt is to use prn antiinflammatories to manage acute pain. The patient is to 
call the office if the pain is worsening or does not improve.

                                                            2019

 

                                        Appointment: Flakita Castro 

WPtel:[phone]

                                        [facility]

                                              (30 min) Complex 2019

 

             Patient Education: Patient Medication Summary                      

     Completed    2019

 

                    Care Plan: Referral Order                     SNOMED-CT : 30

[id number]

                          Pending                   2019

 

                          Visit Plan:               Left knee, ankle, foot pain 

- will send RX - if no improvement will

refer to ortho - The pt is to use prn antiinflammatories to manage acute pain. 
The patient is to call the office if the pain is worsening or does not improve.

                                                            2019

 

                                        Appointment: Flakita Castro 

WPtel:[phone]

                                        [facility]

                                              (30 min) Complex 2019

 

             Patient Education: Patient Medication Summary                      

     Completed    2019

 

                          Visit Plan:               Well Adult - pt was counsele

d about diet, exercise, and encouraged 

to follow a heart healthy diet and increase activity level. The patient was 
instructed to RTC yearly for well adult exams and PRN for acute illnesses. The 
pt was also instructed to have yearly labs for check of cholesterol, thyroid, 
chem panel, CBC, and renal functioning. Post-surgical menopause - I have advised
the pt that we will look for her medications from [facility] to find out what her depo 
dose is so I can wean her off of it. Anxiety - the patient has uncontrolled 
anxiety and will benefit from an SSRI on a daily basis to attempt control of the
symptoms of anxiety (tachycardia, overwhelming sensations, stress, insomnia, 
etc). stop clonidine due to hypotension

                                                            08/15/2019

 

             Patient Education: Patient Medication Summary                      

     Completed    08/15/2019

 

             Referral: Álvaro Sellers  Referral                  Appointment Co

nfirmed  

 

             Referral: Álvaro Sellers  Referral                  Completed     







Instructions





                                        Comment

 

                                        GENERIC ALLEGRA OTC 1 TABLET TWICE A DAY

 FOR 5 DAYS, THEN DECREASE TO ONCE A DAY



OMEPRAZOLE (GENERIC PRILOSEC) 20MG TAKE 1 DAILY X 14



CLORICIDIN HP OVER THE COUNTER DECONGESTANT AS NEEDED WHEN YOU FEEL MORE 
CONGESTED



MIRALAX 17GM 1 CAPFUL MIX WITH WATER DAILY

DUE FOR ANNUAL MAMMOGRAM- order faxed



DUE TO ANNUAL FASTING LABS- orders given at visit



CONTINUE XYZOL OR ZYRTEC



STEROID SHOT TODAY



PREDNISONE TABLETS TAKE 2 TABLETS DAILY X 5 DAYS



PHENERGAN W/CODEINE COUGH MEDICINE

                                        . Allergies - recent increase in nasal c

ongestion that has been worsening cough.

Has tried Claritin in the past without any relief. Discussed use of being 
aggressive with allergies to help decrease cough and loss of voice. Discussed 
use of Allegra twice a day x 5 days then decrease down to daily. BP elevated the
last 2 visits, so discouraged use of Sudafed. Discussed us of Cloricidin HP as 
needed for congestions. 



Laryngitis - secondary to acute bronchitis that has worsened over the last 
month. Cough has improved, but pt reports tightness when lying down. Discussed 
possible silent GERD due to increase in coughing. Recommended taking Omeprazole 
daily x 2 weeks. 



Acute Bronchitis - cough improved, denies chest congestion. Completed 2 rounds 
of antibiotics and 2 rounds of steroids. Discussed treat more aggressive with 
allergies and GERD. Pt encouraged to follow up in 2 weeks if no improvement. 



Slow transit constipation - encouraged use Miralax 1 capful daily, may titrate 
based on needed. 

Allergies exacerbation with cough - chronic - recommended pt to use allergy 
medication as prescribed.  Pt has been counseled as  to the appropriate use of 
the medication.  Pt to call if allergy symptoms are not controlled with the 
medication.

If using nasal spray, instructions as follows: Nasal spray- use twice daily, one
spray per nostril twice daily, after 30 minutes, rinse out nose with saline 
spray.. Use opposite hand per nostril to spray in the nasal steroid allergy 
spray.  Kenalog injection given at visit. Prednisone rx sent. Phenergan with 
codeine cough medicine sent. 



Screening mammogram - orders sent.



Anxiety - controlled. Continue prn alprazolam at bedtime and escitalopram. 



Fasting lab orders sent









 

                                        DUE FOR ANNUAL MAMMOGRAM- order faxed



DUE TO ANNUAL FASTING LABS- orders given at visit



CONTINUE XYZOL OR ZYRTEC



STEROID SHOT TODAY



PREDNISONE TABLETS TAKE 2 TABLETS DAILY X 5 DAYS



PHENERGAN W/CODEINE COUGH MEDICINE

                                        . Allergies exacerbation with cough - ch

[place] - recommended pt to use allergy 

medication as prescribed.  Pt has been counseled as  to the appropriate use of 
the medication.  Pt to call if allergy symptoms are not controlled with the 
medication.

If using nasal spray, instructions as follows: Nasal spray- use twice daily, one
spray per nostril twice daily, after 30 minutes, rinse out nose with saline 
spray.. Use opposite hand per nostril to spray in the nasal steroid allergy 
spray.  Kenalog injection given at visit. Prednisone rx sent. Phenergan with 
codeine cough medicine sent. 



Screening mammogram - orders sent.



Anxiety - controlled. Continue prn alprazolam at bedtime and escitalopram. 



Fasting lab orders sent







 

                                        . Allergies - chronic - recommended pt t

o use allergy medication as prescribed. 

Pt has been counseled as  to the appropriate use of the medication.  Pt to call 
if allergy symptoms are not controlled with the medication.

If using nasal spray, instructions as follows: Nasal spray- use twice daily, one
spray per nostril twice daily, after 30 minutes, rinse out nose with saline 
spray.. Use opposite hand per nostril to spray in the nasal steroid allergy 
spray.



Headache -will treat allergies/congestion - instructed patient to call if does 
not improve and we will order CT Head without contrast. 



 

                                        . Sinusitis - Pt has acute infection - p

ain in face, maxillary region, Pt 

informed to use decongestant, RX given to patient, sinus rinses also 
recommended.  Call if symptoms do not show improvement.



Allergies - chronic - recommended pt to use allergy medication as prescribed.  
Pt has been counseled as  to the appropriate use of the medication.  Pt to call 
if allergy symptoms are not controlled with the medication.

If using nasal spray, instructions as follows: Nasal spray- use twice daily, one
spray per nostril twice daily, after 30 minutes, rinse out nose with saline 
spray.. Use opposite hand per nostril to spray in the nasal steroid allergy 
spray.



Migraine - will treat for sinus infection - pt is to notify clinic if symptoms 
do not improve, if they worsen, or with any changes, questions, or concerns. 

 

                                        . Anxiety - the patient has uncontrolled

 anxiety and will benefit from a short 

term dose of xanax for control of symptoms.

 

                                        . Well Adult Female - exam completed.  P

ap and  breast exam completed.  Pt will 

be called with results of her testing.  She was advised to continue with yearly 
annual exams.   Safe sex practices discussed during office visit today.  Call if
any abnormal gynecologic issues during the next year, otherwise, RTC yearly or 
prn.



 

                                        kenalog 

                                        . Bronchitis - acute case of bronchitis 

identified.  Pt has been given 

antibiotics, breathing treatments as appropriate, and pt has been instructed to 
call if symptoms are not improved, or if symptoms acutely worsen.



 

                                        . URI -viral -flu swab to r/o flu - Pt a

dvised to increase fluids, vitamin C.  

Discussed natural and expected course of this diagnosis and need to alert me if 
symptoms do not follow expected course, or if any worse.



 

                                        . Post-surgical menopause - continue to 

taper depo dose and eventually off 

medication completely -injection today in the office. 



 

                                        tomorrow at 10:15 with the nurse praclouise benavides. Left knee, ankle, foot pain - 

will send RX - will refer to ortho - The pt is to use prn antiinflammatories to 
manage acute pain. The patient is to call the office if the pain is worsening or
does not improve. 





 

                                        . Left knee, ankle, foot pain - will sen

d RX - if no improvement will refer to 

ortho - The pt is to use prn antiinflammatories to manage acute pain. The 
patient is to call the office if the pain is worsening or does not improve. 



 

                                        . Well Adult - pt was counseled about di

et, exercise, and encouraged to follow a

heart healthy diet and increase activity level.  The patient was instructed to 
RTC yearly for well adult exams and PRN for acute illnesses.  The pt was also 
instructed to have yearly labs for check of cholesterol, thyroid, chem panel, 
CBC, and renal functioning.



Post-surgical menopause - I have advised the pt that we will look for her 
medications from [facility] to find out what her depo dose is so I can wean her off of 
it.



Anxiety - the patient has uncontrolled anxiety and will benefit from an SSRI on 
a daily basis to attempt control of the symptoms of anxiety (tachycardia, 
overwhelming sensations, stress, insomnia, etc). 

stop clonidine due to hypotension







Medical Equipment

No Medical Equipment data



Health Concerns Section

Health Concerns data not found



Goals Section

Goals data not found



Interventions Section

Interventions data not found



Health Status Evaluations/Outcomes Section

Health Status Evaluations/Outcomes data not found



Advance Directives

No Advance Directive data

## 2021-12-28 NOTE — XMS REPORT
CCD document using C-CDA

                             Created on: 12/15/2021



AyalaRosibel vegas

External Reference #: [id number]

: 1970

Sex: Female



Demographics





                          Address                   [address]

[place]  [address]

 

                          Home Phone                [phone]

 

                          Preferred Language        Unknown

 

                          Marital Status            Unknown

 

                          Lutheran Affiliation     Unknown

 

                          Race                      White

 

                          Ethnic Group              Not  or 





Author





                          Author                    Rosibel Crump

 

                          Organization              Yesica Crump MD, [facility]

 

                          Address                   [address] [place]  [address]



 

                          Phone                     [phone]







Care Team Providers





                    Care Team Member Name Role                Phone

 

                    Yesica Crump     PP                  Unavailable

 

                          CCM                       Unavailable







Summary Purpose

Interface Exchange



Insurance Providers





             Payer name   Policy type / Coverage type Covered party ID Effective

 Begin Date 

Effective End Date

 

             [facility] Commercial Insurance [id number] 

Unknown      

Unknown







Family history





Mother



                          Diagnosis                 Age At Onset

 

                          Diabetes mellitus Type 2  Unknown

 

                          Alcoholism                Unknown

 

                          Depression                Unknown







Father



                          Diagnosis                 Age At Onset

 

                          Diabetes mellitus Type 2  Unknown







Social History





                Social History Element Codes           Description     Effective

 Dates

 

                    Marital status      Unknown             

Ha                                   08/15/2019

 

                Number of children Unknown         2               08/15/2019

 

                    Employment          Unknown             Currently employed

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

 

                Tobacco history SNOMED CT: 02949160 Current some days smoker 08/

15/2019

 

                Alcohol history SNOMED CT: 240470664 Never drinks alcohol 08/15/

2019







Allergies, Adverse Reactions, Alerts





           Substance  Reaction   Codes      Entered Date Inactivated Date Status

 

           Penicillin            Unknown    08/15/2019 No Inactive Date Active







Problems





                Condition       Codes           Effective Dates Condition Status

 

                          Chronic cough             ICD-10: R05.3

ICD-9: 786.2              2021                Active

 

                          Dysphagia                 ICD-10: r13.10

ICD-9: 787.20             2021                Active

 

                          Laryngitis                ICD-10: J04.0

ICD-9: 464.00             2021                Active

 

                          Other allergic rhinitis   ICD-10: J30.89

ICD-9: 477.8              2020                Active

 

                          Acute bronchitis          ICD-10: J20.9

ICD-9: 466.0              2019                Active

 

                          Slow transit constipation ICD-10: K59.01

ICD-9: 564.01             2021                Active

 

                          Cough                     ICD-10: R05.9

ICD-9: 786.2              10/25/2021                Active

 

                          Encounter for screening mammogram for malignant neopla

sm of breast ICD-10: 

Z12.31

ICD-9: V76.12             11/10/2020                Active

 

                          Generalized anxiety disorder ICD-10: F41.9

ICD-9: 300.00             10/25/2021                Active

 

                          Menopausal and female climacteric states ICD-10: N95.1

ICD-9: 627.2              08/15/2019                Active

 

                          Encounter for general adult medical examination withou

t abnormal findings ICD-

10: Z00.00

ICD-9: V70.0              08/15/2019                Active

 

                          Screening, lipid          ICD-10: Z13.220

ICD-9: V77.91             10/22/2020                Active

 

                          Migraine without status migrainosus, not intractable, 

unspecified migraine type 

ICD-10: G43.909

ICD-9: 346.90             2020                Active

 

                          Headache                  ICD-10: R51

ICD-9: 784.0              2020                Active

 

                          Other acute sinusitis     ICD-10: J01.80

ICD-9: 461.8              2020                Active

 

                          Generalized anxiety disorder ICD-10: F41.1

ICD-9: 300.00             08/15/2019                Active

 

                                        Encounter for gynecological examination 

(general) (routine) without abnormal 

findings                                ICD-10: Z01.419

ICD-9: V72.31             2020                Active

 

                          Cough                     ICD-10: R05

ICD-9: 786.2              2019                Active

 

                          Body aches                ICD-10: R52

ICD-9: 780.96             2019                Active

 

                          Fever                     ICD-10: R50.9

ICD-9: 780.60             2019                Active

 

                          Pain in left ankle and joints of left foot ICD-10: M25

.572

ICD-9: 719.47             2019                Active

 

                          Pain in left knee         ICD-10: M25.562

ICD-9: 719.46             2019                Active







Medications





          Medication Codes     Instructions Start Date Stop Date Status    Fill 

Instructions

 

                    escitalopram 10 mg tablet RxNorm: 909303      TAKE ONE TABLE

T BY MOUTH EVERY NIGHT AT

BEDTIME Tablet(s) Oral 12/15/2021      12/15/2021      Inactive         

 

                    albuterol sulfate HFA 90 mcg/actuation aerosol inhaler RxNor

m: 6563475     Inhale 2 

Inhalation every 4-6 hours as needed cough 2021      No Stop Date    Activ

e           

 

                    atorvastatin 20 mg tablet RxNorm: 727377      Take 1 Tablet(

s) Oral every night at 

bedtime         2021      Active           

 

                Miralax 17 gram/dose oral powder RxNorm: 955286  Take 1 scoop Or

al every day 

2021          12/15/2021          Inactive             

 

                    Allegra Allergy 180 mg tablet RxNorm: 934389      Take 1 Tab

let(s) Oral every day for

first 5 days take twice daily 2021      No Stop Date    Active           

 

                    omeprazole 20 mg tablet,delayed release RxNorm: 716667      

Take 1 Tablet(s) Oral 

every day       2021      Inactive         

 

                prednisone 20 mg tablet RxNorm: 781647  Take 2 Tablet(s) Oral ev

estevan day 

2021          Inactive             

 

                    azithromycin 250 mg tablet RxNorm: 982742      Take 1 Tablet

(s) Oral every day take 2

tablets day 1, then take 1 tablet daily on days 2-5 2021

21          

Inactive                                Please disregard Cefdinir RX.

 

                cefdinir 300 mg capsule RxNorm: 803147  Take 1 Capsule(s) Oral t

wo times a day 

2021          Inactive             

 

                    azithromycin 250 mg tablet RxNorm: 828635      Take 1 Tablet

(s) Oral every day take 2

tablets day 1, then take 1 tablet daily on days 2-5 2021

21          

Inactive                                Please disregard Cefdinir RX.

 

                    promethazine 6.25 mg-codeine 10 mg/5 mL syrup RxNorm: 918486

      Take 5 

Milliliter(s) Oral every 4-6 hours as needed cough 10/25/2021          10/25/202

1          

Inactive                                 

 

                prednisone 20 mg tablet RxNorm: 137712  Take 2 Tablet(s) Oral ev

estevan day 

10/25/2021          10/29/2021          Inactive             

 

                    promethazine 6.25 mg-codeine 10 mg/5 mL syrup RxNorm: 902913

      Take 5 

Milliliter(s) Oral every 4-6 hours as needed cough 10/25/2021          11/03/202

1          

Inactive                                 

 

                    Kenalog 40 mg/mL suspension for injection RxNorm: 6446838   

  Take 1 Milliliter(s) 

Injection       10/25/2021      10/25/2021      Inactive         

 

                    alprazolam 0.5 mg tablet RxNorm: 037096      Take 1 Tablet(s

) Oral two times a day as

needed FOR ANXIETY 10/15/2021      2021      Inactive         

 

                    alprazolam 0.5 mg tablet RxNorm: 538246      1 Tablet(s) Ora

l two times a day as 

needed anxiety  2021      Inactive         

 

                    Depo-Estradiol 5 mg/mL intramuscular oil RxNorm: 374113     

 0.75 Milliliter(s) 

Intramuscular   2021      Inactive         

 

                    alprazolam 0.5 mg tablet RxNorm: 245702      1 Tablet(s) Ora

l two times a day as 

needed anxiety  2021      Inactive         

 

                    Depo-Estradiol 5 mg/mL intramuscular oil RxNorm: 347111     

 0.75 Milliliter(s) 

Intramuscular   2021      Inactive         

 

                    alprazolam 0.5 mg tablet RxNorm: 229526      1 Tablet(s) Ora

l two times a day as 

needed anxiety  2021      Inactive         

 

                    alprazolam 0.5 mg tablet RxNorm: 489315      1 Tablet(s) Ora

l two times a day as 

needed anxiety  04/15/2021      2021      Inactive         

 

                    Depo-Provera 150 mg/mL intramuscular suspension RxNorm: 1000

128     Milliliter(s) 

Intramuscular   2021      Inactive         

 

                    alprazolam 0.5 mg tablet RxNorm: 866587      1 Tablet(s) Ora

l two times a day as 

needed anxiety  2021      Inactive         

 

                    escitalopram 10 mg tablet RxNorm: 471200      TAKE ONE TABLE

T BY MOUTH EVERY NIGHT AT

BEDTIME Tablet(s) Oral 2021      Inactive         

 

                    escitalopram 5 mg tablet RxNorm: 820345      TAKE ONE TABLET

 BY MOUTH EVERY NIGHT AT 

BEDTIME         02/15/2021      2021      Inactive         

 

                    alprazolam 0.5 mg tablet RxNorm: 137581      1 Tablet(s) Ora

l two times a day as 

needed anxiety  02/15/2021      2021      Inactive         

 

                atorvastatin 20 mg tablet RxNorm: 096537  TAKE ONE TABLET BY SHELLY

TH DAILY 

2021          Inactive             

 

                    Depo-Estradiol 5 mg/mL intramuscular oil RxNorm: 423822     

 3/4 Milliliter(s) 

Intramuscular monthly 2021      Inactive         

 

                    Depo-Provera 150 mg/mL intramuscular suspension RxNorm: 1000

128     Milliliter(s) 

Intramuscular   2021      Inactive         

 

                    alprazolam 0.5 mg tablet RxNorm: 455870      1 Tablet(s) Ora

l two times a day as 

needed anxiety  2020      Inactive         

 

                    acyclovir 400 mg tablet RxNorm: 266487      TAKE ONE TABLET 

BY MOUTH FOUR TIMES A DAY

                2020      Inactive         

 

                    alprazolam 0.5 mg tablet RxNorm: 910146      1 Tablet(s) Ora

l two times a day as 

needed anxiety  2020      12/15/2020      Inactive         

 

             atorvastatin 20 mg tablet RxNorm: 971924 1 Tablet(s) Oral every day

 2020                Inactive                   

 

             atorvastatin 20 mg tablet RxNorm: 628395 1 Tablet(s) Oral every day

 2020   

02/10/2021                Inactive                   

 

                    alprazolam 0.5 mg tablet RxNorm: 105401      1 Tablet(s) Ora

l two times a day as 

needed anxiety  10/23/2020      11/15/2020      Inactive         

 

                    Depo-Provera 150 mg/mL intramuscular suspension RxNorm: 1000

128     Milliliter(s) 

Intramuscular   10/20/2020      10/20/2020      Inactive         

 

                    acyclovir 400 mg tablet RxNorm: 302170      TAKE ONE TABLET 

BY MOUTH FOUR TIMES A DAY

                10/06/2020      11/15/2020      Inactive         

 

                    alprazolam 0.5 mg tablet RxNorm: 808613      1 Tablet(s) Ora

l two times a day as 

needed anxiety  2020      10/21/2020      Inactive         

 

                    alprazolam 0.5 mg tablet RxNorm: 081288      1 Tablet(s) Ora

l two times a day as 

needed anxiety  2020      Inactive         

 

                escitalopram 5 mg tablet RxNorm: 036833  1 Tablet(s) Oral every 

night at bedtime 

2020          Inactive             

 

             prednisone 20 mg tablet RxNorm: 731192 2 Tablet(s) Oral every day 0

2020                Inactive                   

 

                    Depo-Provera 150 mg/mL intramuscular suspension RxNorm: 1000

128     0.75 

Milliliter(s) Intramuscular 2020      Inactive         

 

                    Kenalog 40 mg/mL suspension for injection RxNorm: 3866313   

  1 Milliliter(s) 

Injection       2020      Inactive         

 

                Zithromax Z-Timur 250 mg tablet RxNorm: 738367  Tablet(s) Oral as 

directed 

2020          Inactive             

 

                    alprazolam 0.5 mg tablet RxNorm: 225444      1 Tablet(s) Ora

l two times a day as 

needed anxiety  2020      Inactive         

 

                    alprazolam 0.5 mg tablet RxNorm: 717201      1 Tablet(s) Ora

l two times a day as 

needed anxiety  2020      Inactive         

 

                    Depo-Provera 150 mg/mL intramuscular suspension RxNorm: 1000

128     Milliliter(s) 

Intramuscular   2020      Inactive         

 

                    acyclovir 400 mg tablet RxNorm: 355660      1 Tablet(s) Oral

 four times a day fever 

blisters        2020      Inactive         

 

                    Depo-Provera 150 mg/mL intramuscular suspension RxNorm: 1000

128     Milliliter(s) 

Intramuscular   2020      Inactive         

 

                pravastatin 10 mg tablet RxNorm: 489974  1 Tablet(s) Oral every 

night at bedtime 

2020          Inactive             

 

                pravastatin 10 mg tablet RxNorm: 153856  1 Tablet(s) Oral every 

night at bedtime 

2020          Inactive             

 

                    Depo-Estradiol 5 mg/mL intramuscular oil RxNorm: 215113     

 3/4 Milliliter(s) 

Intramuscular monthly 2020      Inactive         

 

                    Kenalog 40 mg/mL suspension for injection RxNorm: 2791636   

  Milliliter(s) 

Injection       2019      Inactive         

 

                    Phenergan with Codeine Syrup RxNorm:             5-10 Millil

iter(s) Oral Every 6 hrs as 

needed          2019      Inactive         

 

             prednisone 20 mg tablet RxNorm: 284512 2 Tablet(s) Oral every day 1

2019                Inactive                  start tomorrow

 

                    doxycycline hyclate 100 mg tablet RxNorm: 0760168     1 Tabl

et(s) Oral two times a 

day             2019      Inactive        start if symptom

s do not improve

 

                    Depo-Estradiol 5 mg/mL intramuscular oil RxNorm: 899469     

 3/4 Milliliter(s) 

Intramuscular monthly 2019      Inactive         

 

                    Depo-Estradiol 5 mg/mL intramuscular oil RxNorm: 194305     

 3/4 Milliliter(s) 

Intramuscular monthly 10/31/2019      2019      Inactive         

 

                    gabapentin 100 mg capsule RxNorm: 944922      1 Capsule(s) O

ral every night at 

bedtime         10/16/2019      2019      Inactive         

 

             gabapentin 100 mg capsule RxNorm: 336667 1 Capsule(s) PO QHS 09/12/

2019   

10/11/2019                Inactive                   

 

           naproxen 500 mg tablet RxNorm: 559172 1 Tablet(s) PO BID 2019 0

2019 

Inactive                                 

 

             escitalopram 5 mg tablet RxNorm: 969471 1 Tablet(s) PO QHS 08/15/20

19   2019

                          Inactive                   

 

          CoQ-10 oral RxNorm: 77289 oral      2021           Active     

 

             ranitidine 150 mg capsule RxNorm: 918659 1 Capsule(s) PO BID 2019                Inactive                   

 

             eszopiclone 1 mg tablet RxNorm: 907883 Tablet(s) PO as needed 2020                Inactive                   

 

                acyclovir 400 mg tablet RxNorm: 126661  1 Tablet(s) PO PRN fever

 blisters 

2020          Inactive             

 

             clonidine HCl 0.1 mg tablet RxNorm: 002908 Tablet(s) PO as needed 0

8/15/2019   

2019                Inactive                   







Medication Administered





             Medication   Codes        Instructions Start Date   Status

 

                Kenalog 40 mg/mL suspension for injection RxNorm: 8508111 1Milli

liter     10/25/2021

                                        No longer Active

 

                Depo-Estradiol 5 mg/mL intramuscular oil RxNorm: 531038  0.75Mil

liliter  

2021                              No longer Active

 

                Depo-Estradiol 5 mg/mL intramuscular oil RxNorm: 065316  0.75Mil

liliter  

2021                              No longer Active

 

                Depo-Provera 150 mg/mL intramuscular suspension RxNorm: 8862158 

Milliliter      

2021                              No longer Active

 

                Depo-Provera 150 mg/mL intramuscular suspension RxNorm: 2860568 

Milliliter      

2021                              No longer Active

 

                Depo-Provera 150 mg/mL intramuscular suspension RxNorm: 6918693 

Milliliter      

10/20/2020                              No longer Active

 

                Depo-Provera 150 mg/mL intramuscular suspension RxNorm: 0790658 

0.75Milliliter  

2020                              No longer Active

 

                Kenalog 40 mg/mL suspension for injection RxNorm: 4256021 1Milli

liter     2020

                                        No longer Active

 

                Depo-Provera 150 mg/mL intramuscular suspension RxNorm: 8070715 

Milliliter      

2020                              No longer Active

 

                Depo-Provera 150 mg/mL intramuscular suspension RxNorm: 2917990 

Milliliter      

2020                              No longer Active

 

                Depo-Estradiol 5 mg/mL intramuscular oil RxNorm: 575260  3/4Mill

ilitermonthly 

2020                              Active

 

             Kenalog 40 mg/mL suspension for injection RxNorm: 3634002 Millilite

r   2019   

No longer Active

 

                Depo-Estradiol 5 mg/mL intramuscular oil RxNorm: 957047  3/4Mill

ilitermonthly 

2019                              Active







Immunizations

No Immunization data



Results





          Observation Observation Code Item      Item Code Result    Date      S

[facility] Location

 

          Cbc With Differential Ord2      WBC                 11.77 K/ul 

021 Unknown

 

          Cbc With Differential Ord2      RBC                 4.50 M/ul 20

21 Unknown

 

          Cbc With Differential Ord2      HGB                 13.6 g/dl 20

21 Unknown

 

          Cbc With Differential Ord2      Neut%               69.1 %    20

21 Unknown

 

          Cbc With Differential Ord2      HCT                 43.0 %    20

21 Unknown

 

          Cbc With Differential Ord2      MCV                 95.6 fl   20

21 Unknown

 

          Cbc With Differential Ord2      Lymph%              22.0 %    20

21 Unknown

 

          Cbc With Differential Ord2      MCH                 30.2 pg   20

21 Unknown

 

          Cbc With Differential Ord2      Mono%               6.0 %     20

21 Unknown

 

          Cbc With Differential Ord2      Eos%                2.6 %     20

21 Unknown

 

          Cbc With Differential Ord2      MCHC                31.6 pg   20

21 Unknown

 

          Cbc With Differential Ord2      PLT                 288 K/ul  20

21 Unknown

 

          Cbc With Differential Ord2      Baso%               0.3 %     20

21 Unknown

 

          Cbc With Differential Ord2      RDW                 13.5 %    20

21 Unknown

 

          Cbc With Differential Ord2      Neut ABS#           8.13 K/ul 20

21 Unknown

 

          Cbc With Differential Ord2      Lymph ABS#           2.59 K/ul 

021 Unknown

 

          Cbc With Differential Ord2      Mono ABS#           0.7 K/ul  20

21 Unknown

 

          Cbc With Differential Ord2      Eos ABS#            0.3 K/ul  20

21 Unknown

 

          Cbc With Differential Ord2      Baso ABS#           0.0 K/ul  20

21 Unknown

 

          Comp Metabolic Ftr991    NA                  142 mEq/L 2021 Unkn

own

 

          Comp Metabolic Kav435    K                   4.4 mEq/L 2021 Unkn

own

 

          Comp Metabolic Pps686    CL                  101 mEq/L 2021 Unkn

own

 

          Comp Metabolic Hze378    CO2                 32.0 mEq/L 2021 Unk

nown

 

          Comp Metabolic Tmq032    ANION GAP           13        2021 Unkn

own

 

          Comp Metabolic Jtn247    GLUCOSE             89 mg/dL  2021 Unkn

own

 

          Comp Metabolic Yxl037    Creat               0.7 mg/dL 2021 Unkn

own

 

          Comp Metabolic Vbp618    eGFR                90 ml/min/1.73m2 20

21 Unknown

 

          Comp Metabolic Qll827    BUN                 19 mg/dL  2021 Unkn

own

 

          Comp Metabolic Ncq231    B/C Ratio           26.4 Ratio 2021 Unk

nown

 

          Comp Metabolic Pui252    CALCIUM             9.2 mg/dL 2021 Unkn

own

 

          Comp Metabolic Ueb802    ALK PHOS            85 U/L    2021 Unkn

own

 

          Comp Metabolic Bpj497    AST(SGOT)           16 U/L    2021 Unkn

own

 

          Comp Metabolic Jlq011    ALT(SGPT)           19 U/L    2021 Unkn

own

 

          Comp Metabolic Bgr043    BILI T              0.6 mg/dL 2021 Unkn

own

 

          Comp Metabolic Ktv820    ALBUMIN             4.2 g/dL  2021 Unkn

own

 

          Comp Metabolic Qto121    TPRO                7.1 g/dL  2021 Unkn

own

 

          Comp Metabolic Bfn230    GLOB                2.9 g/dL  2021 Unkn

own

 

          Comp Metabolic Qoq384    A/G Ratio           1.5 Ratio 2021 Unkn

own

 

          Comp Metabolic Gei260    Osmo                285 mOsmo 2021 Unkn

own

 

          Tsh       Ord6      TSH (3rd IS)           1.14 uIU/mL 2021 Unkn

own

 

          Lipid     Ord30     CHOL                233 mg/dL 2021 Unknown

 

          Lipid     Ord30     HDL                 54.0 mg/dl 2021 Unknown

 

          Lipid     Ord30     TRIG                101 mg/dL 2021 Unknown

 

          Lipid     Ord30     LDL                 159 mg/dL 2021 Unknown

 

          Lipid     Ord30     C/HDL               4.3 Ratio 2021 Unknown

 

          Lipid     Ord30     CHOL                249 mg/dL 10/22/2020 Unknown

 

          Lipid     Ord30     HDL                 55.0 mg/dl 10/22/2020 Unknown

 

          Lipid     Ord30     TRIG                82 mg/dL  10/22/2020 Unknown

 

          Lipid     Ord30     LDL                 178 mg/dL 10/22/2020 Unknown

 

          Lipid     Ord30     C/HDL               4.5 Ratio 10/22/2020 Unknown

 

          Comp Metabolic Agb793    NA                  138 mEq/L 10/22/2020 Unkn

own

 

          Comp Metabolic Whn459    K                   4.5 mEq/L 10/22/2020 Unkn

own

 

          Comp Metabolic Atn305    CL                  103 mEq/L 10/22/2020 Unkn

own

 

          Comp Metabolic Hfx195    CO2                 28.0 mEq/L 10/22/2020 Unk

nown

 

          Comp Metabolic Ecg181    ANION GAP           12        10/22/2020 Unkn

own

 

          Comp Metabolic Zor286    GLUCOSE             90 mg/dL  10/22/2020 Unkn

own

 

          Comp Metabolic Cri239    Creat               0.7 mg/dL 10/22/2020 Unkn

own

 

          Comp Metabolic Rmv914    eGFR                88 ml/min/1.73m2 10/22/20

20 Unknown

 

          Comp Metabolic Qvk171    BUN                 20 mg/dL  10/22/2020 [facility]

own

 

          Comp Metabolic Ebo137    B/C Ratio           27.0 Ratio 10/22/2020 [facility]

nown

 

          Comp Metabolic Dyp916    CALCIUM             9.5 mg/dL 10/22/2020 [facility]

own

 

          Comp Metabolic Rkj275    ALK PHOS            87 U/L    10/22/2020 [facility]

own

 

          Comp Metabolic Hel796    AST(SGOT)           17 U/L    10/22/2020 [facility]

own

 

          Comp Metabolic Sjx749    ALT(SGPT)           20 U/L    10/22/2020 [facility]

own

 

          Comp Metabolic Lph534    BILI T              0.6 mg/dL 10/22/2020 [facility]

own

 

          Comp Metabolic Pff063    ALBUMIN             4.5 g/dL  10/22/2020 [facility]

own

 

          Comp Metabolic Eig409    TPRO                7.6 g/dL  10/22/2020 [facility]

own

 

          Comp Metabolic Gdb634    GLOB                3.1 g/dL  10/22/2020 [facility]

own

 

          Comp Metabolic Fbd114    A/G Ratio           1.5 Ratio 10/22/2020 [facility]

own

 

          Comp Metabolic Pmy498    Osmo                278 mOsmo 10/22/2020 [facility]

own

 

          Cbc With Differential Ord2      WBC                 10.56 K/ul 10/22/2

020 Unknown

 

          Cbc With Differential Ord2      RBC                 4.44 M/ul 10/22/20

20 Unknown

 

          Cbc With Differential Ord2      HGB                 14.0 g/dl 10/22/20

20 Unknown

 

          Cbc With Differential Ord2      HCT                 42.8 %    10/22/20

20 Unknown

 

          Cbc With Differential Ord2      Neut%               73.9 %    10/22/20

20 Unknown

 

          Cbc With Differential Ord2      MCV                 96.4 fl   10/22/20

20 Unknown

 

          Cbc With Differential Ord2      Lymph%              18.2 %    10/22/20

20 Unknown

 

          Cbc With Differential Ord2      MCH                 31.5 pg   10/22/20

20 Unknown

 

          Cbc With Differential Ord2      Mono%               5.4 %     10/22/20

20 Unknown

 

          Cbc With Differential Ord2      MCHC                32.7 pg   10/22/20

20 Unknown

 

          Cbc With Differential Ord2      Eos%                2.2 %     10/22/20

20 Unknown

 

          Cbc With Differential Ord2      PLT                 258 K/ul  10/22/20

20 Unknown

 

          Cbc With Differential Ord2      Baso%               0.3 %     10/22/20

20 Unknown

 

          Cbc With Differential Ord2      RDW                 13.4 %    10/22/20

20 Unknown

 

          Cbc With Differential Ord2      Neut ABS#           7.81 K/ul 10/22/20

20 Unknown

 

          Cbc With Differential Ord2      Lymph ABS#           1.92 K/ul 10/22/2

020 Unknown

 

          Cbc With Differential Ord2      Mono ABS#           0.6 K/ul  10/22/20

20 Unknown

 

          Cbc With Differential Ord2      Eos ABS#            0.2 K/ul  10/22/20

20 Unknown

 

          Cbc With Differential Ord2      Baso ABS#           0.0 K/ul  10/22/20

20 Unknown

 

          Tsh       Ord6      TSH (3rd IS)           1.38 uIU/mL 2020 [facility]

own

 

          Comp Metabolic Jcb804    NA                  141 mEq/L 2020 [facility]

own

 

          Comp Metabolic Ttu834    K                   4.3 mEq/L 2020 [facility]

own

 

          Comp Metabolic Dov201    CL                  104 mEq/L 2020 [facility]

own

 

          Comp Metabolic Tiz017    CO2                 29.0 mEq/L 2020 [facility]

nown

 

          Comp Metabolic Vfx373    ANION GAP           12        2020 [facility]

own

 

          Comp Metabolic Eqs529    GLUCOSE             83 mg/dL  2020 [facility]

own

 

          Comp Metabolic Zgm292    Creat               0.7 mg/dL 2020 [facility]

own

 

          Comp Metabolic Vkc564    eGFR                102 ml/min/1.73m2 

020 Unknown

 

          Comp Metabolic Bxp899    BUN                 16 mg/dL  2020 [facility]

own

 

          Comp Metabolic Llc281    B/C Ratio           24.6 Ratio 2020 [facility]

nown

 

          Comp Metabolic Scx585    CALCIUM             9.3 mg/dL 2020 [facility]

own

 

          Comp Metabolic Eqh793    ALK PHOS            79 U/L    2020 [facility]

own

 

          Comp Metabolic Aiv511    AST(SGOT)           19 U/L    2020 [facility]

own

 

          Comp Metabolic Hwo182    ALT(SGPT)           26 U/L    2020 [facility]

own

 

          Comp Metabolic Kvt974    BILI T              0.5 mg/dL 2020 [facility]

own

 

          Comp Metabolic Xis107    ALBUMIN             4.2 g/dL  2020 [facility]

own

 

          Comp Metabolic Wsq623    TPRO                7.0 g/dL  2020 [facility]

own

 

          Comp Metabolic Xpn144    GLOB                2.8 g/dL  2020 [facility]

own

 

          Comp Metabolic Non034    A/G Ratio           1.5 Ratio 2020 [facility]

own

 

          Comp Metabolic Dgj361    Osmo                282 mOsmo 2020 [facility]

own

 

          Cbc With Differential Ord2      WBC                 8.94 K/ul 20

20 Unknown

 

          Cbc With Differential Ord2      RBC                 4.34 M/ul 20

20 Unknown

 

          Cbc With Differential Ord2      HGB                 13.3 g/dl 20

20 Unknown

 

          Cbc With Differential Ord2      HCT                 40.7 %    20

20 Unknown

 

          Cbc With Differential Ord2      Neut%               71.8 %    20

20 Unknown

 

          Cbc With Differential Ord2      MCV                 93.8 fl   20

20 Unknown

 

          Cbc With Differential Ord2      Lymph%              19.5 %    20

20 Unknown

 

          Cbc With Differential Ord2      MCH                 30.6 pg   20

20 Unknown

 

          Cbc With Differential Ord2      Mono%               6.2 %     20

20 Unknown

 

          Cbc With Differential Ord2      Eos%                2.2 %     20

20 Unknown

 

          Cbc With Differential Ord2      MCHC                32.7 pg   20

20 Unknown

 

          Cbc With Differential Ord2      Baso%               0.3 %     20

20 Unknown

 

          Cbc With Differential Ord2      PLT                 262 K/ul  20

20 Unknown

 

          Cbc With Differential Ord2      RDW                 13.4 %    20

20 Unknown

 

          Cbc With Differential Ord2      Neut ABS#           6.42 K/ul 20

20 Unknown

 

          Cbc With Differential Ord2      Lymph ABS#           1.74 K/ul 

020 Unknown

 

          Cbc With Differential Ord2      Mono ABS#           0.6 K/ul  20

20 Unknown

 

          Cbc With Differential Ord2      Eos ABS#            0.2 K/ul  20

20 Unknown

 

          Cbc With Differential Ord2      Baso ABS#           0.0 K/ul  20

20 Unknown

 

          Lipid     Ord30     CHOL                240 mg/dL 2020 Unknown

 

          Lipid     Ord30     HDL                 57.0 mg/dl 2020 Unknown

 

          Lipid     Ord30     TRIG                80 mg/dL  2020 Unknown

 

          Lipid     Ord30     LDL                 167 mg/dL 2020 Unknown

 

          Lipid     Ord30     C/HDL               4.2 Ratio 2020 Unknown

 

          C A/B FLU [id number]   Influenza A Scr           Negative  2019 [facility]

nown

 

          C A/B FLU [id number]   Influenza B Scr           Negative  2019 [facility]

nown

 

           C A/B FLU  [id number]    Influenza Intrp B AG:PRID:PT:NOSE:NOM:IF       

     See Footnote  

2019                              Unknown







Procedures





                    Procedure           Codes               Date

 

                    THER/PROPH/DIAG INJ SC/IM CPT-4: 84627        2021

 

                    THER/PROPH/DIAG INJ SC/IM CPT-4: 10236        2021

 

                    THER/PROPH/DIAG INJ SC/IM CPT-4: 85339        2021

 

                    THER/PROPH/DIAG INJ SC/IM CPT-4: 90123        2021

 

                    THER/PROPH/DIAG INJ SC/IM CPT-4: 17724        10/20/2020

 

                    THER/PROPH/DIAG INJ SC/IM CPT-4: 47250        2020

 

                    TRIAMCINOLONE ACET INJ NOS 10 mg CPT-4:         

020

 

                    THER/PROPH/DIAG INJ SC/IM CPT-4: 43333        2020

 

                    THER/PROPH/DIAG INJ SC/IM CPT-4: 06343        2020

 

                    THER/PROPH/DIAG INJ SC/IM CPT-4: 04154        2020

 

                    TRIAMCINOLONE ACET INJ NOS 10 mg CPT-4:         

019

 

                    THER/PROPH/DIAG INJ SC/IM CPT-4: 64300        2019







Vital Signs





                          Date                      Vital

 

                2021      Blood Pressure 1: 110/80 Code: 8480-6 BMI: 30.2 

Code: 06679-8 Heart 

Rate 1: 88 bpm      Height: 5'2" Code: 8302-2 SpO2: 98%           Temperature: 3

6.8 (C) / 98.2 

(F)                                     Weight: 165 lbs  Code: 74833-6

 

                2021      Blood Pressure 1: 156/82 Code: 8480-6 Heart Rate

 1: 81 bpm Height: 

5'2" Code: 8302-2   Height: 5'2" Code: 8302-2 SpO2: 99%           Temperature: 3

6.2 (C) / 

97.1 (F)                                Weight:  Code: 15023-0

 

                10/25/2021      Blood Pressure 1: 142/68 Code: 8480-6 BMI: 30.7 

Code: 46240-7 Heart 

Rate 1: 63 bpm      Height: 5'2" Code: 8302-2 SpO2: 97%           Temperature: 3

6.4 (C) / 97.6 

(F)                                     Weight: 168 lbs  Code: 35418-0

 

                2020      Blood Pressure 1: 130/80 Code: 8480-6 BMI: 28.5 

Code: 68488-0 Heart 

Rate 1: 69 bpm      Height: 5'2" Code: 8302-2 SpO2: 97%           Temperature: 3

6.9 (C) / 98.4 

(F)                                     Weight: 156 lbs  Code: 92471-6

 

                2020      Blood Pressure 1: 128/78 Code: 8480-6 Heart Rate

 1: 74 bpm Height:  

Code: 8302-2              SpO2: 98%                 Weight:  Code: 66560-1

 

                    2020          Height:  Code: 8302-2 Weight:  Code: 294

63-7

 

                2020      Blood Pressure 1: 132/80 Code: 8480-6 BMI: 28.9 

Code: 31432-6 Heart 

Rate 1: 68 bpm      Height: 5'2" Code: 8302-2 SpO2: 97%           Weight: 158 lb

s  Code: 

33060-2

 

                2019      Blood Pressure 1: 110/60 Code: 8480-6 BMI: 28.5 

Code: 95376-7 Heart 

Rate 1: 76 bpm      Height: 5'2" Code: 8302-2 SpO2: 98%           Temperature: 3

6.9 (C) / 98.5 

(F)                                     Weight: 156 lbs  Code: 80631-6

 

             2019   Blood Pressure 1: 124/80 Code: 8480-6 Heart Rate 1: 81

 bpm SpO2: 98%    

Temperature: 36.7 (C) / 98.1 (F)

 

                2019      Blood Pressure 1: 130/78 Code: 8480-6 BMI: 28.0 

Code: 32231-7 Heart 

Rate 1: 68 bpm      Height: 5'2" Code: 8302-2 SpO2: 98%           Weight: 153 lb

s  Code: 

60462-8

 

                2019      Heart Rate 1: 68 bpm Height:  Code: 8302-2 Weigh

t:  Code: 78986-7

 

                2019      Blood Pressure 1: 132/74 Code: 8480-6 BMI: 27.4 

Code: 31178-7 Heart 

Rate 1: 66 bpm      Height: 5'2" Code: 8302-2 SpO2: 98%           Weight: 150 lb

s  Code: 

61769-3

 

                08/15/2019      Blood Pressure 1: 104/60 Code: 8480-6 BMI: 27.5 

Code: 08757-6 Heart 

Rate 1: 66 bpm      Height: 5'2" Code: 8302-2 SpO2: 98%           Weight: 150 lb

s 5 oz Code: 

66858-7







Functional Status

No Functional Status data



Reason For Visit





                    Reason For Visit    Effective Dates     Notes

 

                    cough               2021           

 

                    cough               2021           

 

                    sinus congestion    10/25/2021           

 

                    headache            2020           

 

                    headache            2020           

 

                    well woman exam (40-65 years) 2020           

 

                    cough               2019           

 

                    fever               2019           

 

                    menopausal symptoms 2019           

 

                    lower leg pain      2019           

 

                    lower leg pain      2019           

 

                    anxiety             08/15/2019           







Encounters





             Encounter    Performer    Location     Codes        Date

 

                                        [phone]) 11061 EST. PATIENT, LEVEL IV

Diagnosis: Chronic cough[ICD10: R05.3]

Diagnosis: Laryngitis[ICD10: J04.0]

Diagnosis: Other allergic rhinitis[ICD10: J30.89]

Diagnosis: Dysphagia[ICD10: r13.10] Eulalia Crump MD, [facility]

PT-4: 

74595                                   2021

 

                                        [phone]) 71729 EST. PATIENT, LEVEL IV

Diagnosis: Acute bronchitis[ICD10: J20.9]

Diagnosis: Laryngitis[ICD10: J04.0]

Diagnosis: Other allergic rhinitis[ICD10: J30.89]

Diagnosis: Slow transit constipation[ICD10: K59.01] [facility]                   CPT-4: 58224              2021

 

                                        [phone]) 29371 EST. PATIENT, LEVEL IV

Diagnosis: Generalized anxiety disorder[ICD10: F41.9]

Diagnosis: Cough[ICD10: R05.9]

Diagnosis: Other allergic rhinitis[ICD10: J30.89]

Diagnosis: Encounter for screening mammogram for malignant neoplasm of 
breast[ICD10: Z12.31] Eulalia Crump MD, [facility] CPT-4: 22435    

10/25/2021

 

                                        [phone]) 25375 EST. PATIENT, LEVEL III

Diagnosis: Headache[ICD10: R51]

Diagnosis: Other allergic rhinitis[ICD10: J30.89] [facility]          CPT-4: 64632              2020

 

                                        86368 EST. PATIENT, LEVEL III

Diagnosis: Other acute sinusitis[ICD10: J01.80]

Diagnosis: Other allergic rhinitis[ICD10: J30.89]

Diagnosis: Migraine without status migrainosus, not intractable, unspecified 
migraine type[ICD10: G43.909]

Diagnosis: Menopausal and female climacteric states[ICD10: N95.1] Flakita Crump MD, [facility]    CPT-4: 57689              2020

 

                                        [phone] 70559 EST. PATIENT, LEVEL III

Diagnosis: Generalized anxiety disorder[ICD10: F41.1] Yesica Crump MD, [facility]          CPT-4: 79769              2020

 

                                        ([phone]) PREV VISIT EST AGE 40-64

Diagnosis: Encounter for gynecological examination (general) (routine) without 
abnormal findings[ICD10: Z01.419] Bree Crump MD, [facility] CPT

-4:

08789                                   2020

 

                                        [phone] 64384 EST. PATIENT, LEVEL III

Diagnosis: Cough[ICD10: R05]

Diagnosis: Acute bronchitis[ICD10: J20.9] Bree carrasco MD, 

[facility]                       CPT-4: 36520              2019

 

                                        [phone] 46122 EST. PATIENT, LEVEL III

Diagnosis: Fever[ICD10: R50.9]

Diagnosis: Body aches[ICD10: R52] Bree Crump MD, [facility] CPT

-4:

63980                                   2019

 

                                        [phone]) 80880 EST. PATIENT, LEVEL III

Diagnosis: Menopausal and female climacteric states[ICD10: N95.1] Bree Crump MD, [facility] CPT-4: 86276        2019

 

                                        76597 EST. PATIENT, LEVEL III

Diagnosis: Pain in left ankle and joints of left foot[ICD10: M25.572]

Diagnosis: Pain in left knee[ICD10: M25.562] Flakita jackson MD, [facility]

                          CPT-4: 07725              2019

 

                                        48208 EST. PATIENT, LEVEL III

Diagnosis: Pain in left ankle and joints of left foot[ICD10: M25.572]

Diagnosis: Pain in left knee[ICD10: M25.562] [facility]

                          CPT-4: 57850              2019

 

                                        (95308) PREV VISIT NEW AGE 40-64

Diagnosis: Encounter for general adult medical examination without abnormal 
findings[ICD10: Z00.00] [facility] CPT-4: 40647    

08/15/2019







Plan of Care





             Planned Activity Notes        Codes        Status       Date

 

                          Visit Plan:               Chronic cough/laryngitis - h

as been persistent for the last 6 

weeks, despite treatment of allergies, GERD, and URI with steroids and 
antibiotic. Will obtain CXR today and rx for Albuterol inhaler sent to pharmacy 
to see if improvement with use. Dysphagia - pt reports feeling as though food is
getting caught in her esophagus. Pt was being treated for possible silent GERD 
as has had a persistent cough despite improvement in sinus congestion and 
drainage. Per pt request will refer for EGD with Dr. Wayen. Pt reports family 
history of esophageal stricture. Allergies - pt reports sinus congestion and jonasgregg monae has significantly improved since taking Allegra daily.

                                                            2021

 

                                        Appointment: Eulaila Osman 

WPtel:[phone]

                                        [facility] [facility]

US                                              (30 min) Complex 2021

 

             Patient Education: Patient Medication Summary                      

     Completed    2021

 

                    Care Plan: CHEST X-RAY 2VW FRONTAL&LATL                     

LOINC : 02567-3

                          Pending                   2021

 

                          Visit Plan:               Allergies - recent increase 

in nasal congestion that has been 

worsening cough. Has tried Claritin in the past without any relief. Discussed 
use of being aggressive with allergies to help decrease cough and loss of voice.
Discussed use of Allegra twice a day x 5 days then decrease down to daily. BP 
elevated the last 2 visits, so discouraged use of Sudafed. Discussed us of 
Cloricidin HP as needed for congestions. Laryngitis - secondary to acute 
bronchitis that has worsened over the last month. Cough has improved, but pt 
reports tightness when lying down. Discussed possible silent GERD due to increas
e in coughing. Recommended taking Omeprazole daily x 2 weeks. Acute Bronchitis -
cough improved, denies chest congestion. Completed 2 rounds of antibiotics and 2
rounds of steroids. Discussed treat more aggressive with allergies and GERD. Pt 
encouraged to follow up in 2 weeks if no improvement. Slow transit constipation 
- encouraged use Miralax 1 capful daily, may titrate based on needed. Allergies 
exacerbation with cough - chronic - recommended pt to use allergy medication as 
prescribed. Pt has been counseled as to the appropriate use of the medication. 
Pt to call if allergy symptoms are not controlled with the medication. If using 
nasal spray, instructions as follows: Nasal spray- use twice daily, one spray 
per nostril twice daily, after 30 minutes, rinse out nose with saline spray.. 
Use opposite hand per nostril to spray in the nasal steroid allergy spray. 
Kenalog injection given at visit. Prednisone rx sent. Phenergan with codeine 
cough medicine sent. Screening mammogram - orders sent. Anxiety - controlled. 
Continue prn alprazolam at bedtime and escitalopram. Fasting lab orders sent

                                                            2021

 

                                        Appointment: Eulalia Osman 

WPtel:[phone]

                                        [address] [facility]

                                              (30 min) Complex 2021

 

             Patient Education: Patient Medication Summary                      

     Completed    2021

 

             Patient Education: Patient Medication Summary                      

     Completed    2021

 

                          Visit Plan:               Allergies exacerbation with 

cough - chronic - recommended pt to use

allergy medication as prescribed. Pt has been counseled as to the appropriate 
use of the medication. Pt to call if allergy symptoms are not controlled with 
the medication. If using nasal spray, instructions as follows: Nasal spray- use 
twice daily, one spray per nostril twice daily, after 30 minutes, rinse out nose
with saline spray.. Use opposite hand per nostril to spray in the nasal steroid 
allergy spray. Kenalog injection given at visit. Prednisone rx sent. Phenergan 
with codeine cough medicine sent. Screening mammogram - orders sent. Anxiety - 
controlled. Continue prn alprazolam at bedtime and escitalopram. Fasting lab 
orders sent

                                                            10/25/2021

 

                                        Appointment: Eulalia Osman 

WPtel:[phone]

                                        [facility] [facility]

                                              (30 min) Complex 10/25/2021

 

             Patient Education: Patient Medication Summary                      

     Completed    10/25/2021

 

             Patient Education: prednisone- [employer] C[employer]on [id number]         

                  Completed    

10/25/2021

 

             Appointment:                            Injection    2021

 

             Patient Education: Patient Medication Summary                      

     Completed    2021

 

             Appointment:                            Injection    2021

 

             Patient Education: Patient Medication Summary                      

     Completed    2021

 

             Appointment:                            Injection    2021

 

             Appointment:                            Injection    2021

 

             Patient Education: Patient Medication Summary                      

     Completed    2021

 

             Appointment:                            Injection    2021

 

             Patient Education: Patient Medication Summary                      

     Completed    2021

 

             Patient Education: Patient Medication Summary                      

     Completed    11/10/2020

 

             Patient Education: Patient Medication Summary                      

     Completed    10/22/2020

 

             Appointment:                            Injection    10/20/2020

 

             Patient Education: Patient Medication Summary                      

     Completed    10/20/2020

 

                          Visit Plan:               Allergies - chronic - recomm

ended pt to use allergy medication as 

prescribed. Pt has been counseled as to the appropriate use of the medication. 
Pt to call if allergy symptoms are not controlled with the medication. If using 
nasal spray, instructions as follows: Nasal spray- use twice daily, one spray 
per nostril twice daily, after 30 minutes, rinse out nose with saline spray.. 
Use opposite hand per nostril to spray in the nasal steroid allergy spray. 
Headache -will treat allergies/congestion - instructed patient to call if does 
not improve and we will order CT Head without contrast.

                                                            2020

 

                                        Appointment: Bree Oleary 

WPtel:[phone] [address] [facility]

                                              (15 min) Moderate 2020

 

             Patient Education: Patient Medication Summary                      

     Completed    2020

 

                          Visit Plan:               Sinusitis - Pt has acute inf

ection - pain in face, maxillary 

region, Pt informed to use decongestant, RX given to patient, sinus rinses also 
recommended. Call if symptoms do not show improvement. Allergies - chronic - 
recommended pt to use allergy medication as prescribed. Pt has been counseled as
to the appropriate use of the medication. Pt to call if allergy symptoms are not
controlled with the medication. If using nasal spray, instructions as follows: 
Nasal spray- use twice daily, one spray per nostril twice daily, after 30 
minutes, rinse out nose with saline spray.. Use opposite hand per nostril to 
spray in the nasal steroid allergy spray. Migraine - will treat for sinus 
infection - pt is to notify clinic if symptoms do not improve, if they worsen, 
or with any changes, questions, or concerns.

                                                            2020

 

                                        Appointment: Flakita Castro 

WPtel:[phone] [facility]

                                              (30 min) Complex 2020

 

             Patient Education: Patient Medication Summary                      

     Completed    2020

 

                          Visit Plan:               Anxiety - the patient has un

controlled anxiety and will benefit 

from a short term dose of xanax for control of symptoms.

                                                            2020

 

                                        Appointment: Yesica Crump 

WPtel:[phone] [address] [facility]

                                              TeleHealth      2020

 

             Patient Education: Patient Medication Summary                      

     Completed    2020

 

             Appointment:                            Injection    2020

 

             Patient Education: Patient Medication Summary                      

     Completed    2020

 

             Appointment:                            Injection    2020

 

             Appointment:                            Injection    2020

 

             Patient Education: Patient Medication Summary                      

     Completed    2020

 

                                        Appointment: Yesica Crump 

WPtel:[phone]

                                        [facility]

                                              Well Woman      03/10/2020

 

                          Visit Plan:               Well Adult Female - exam com

pleted. Pap and breast exam completed. 

Pt will be called with results of her testing. She was advised to continue with 
yearly annual exams. Safe sex practices discussed during office visit today. 
Call if any abnormal gynecologic issues during the next year, otherwise, RTC 
yearly or prn.

                                                            2020

 

                                        Appointment: Bree Oleary 

WPtel:[phone]

                                        [facility]

                                              Well Woman      2020

 

             Patient Education: Patient Medication Summary                      

     Completed    2020

 

                                        Appointment: Yesica Crump 

WPtel:[phone]

                                        [facility]

                                              (15 min) Moderate 2020

 

             Appointment:                            Injection    2020

 

             Patient Education: Patient Medication Summary                      

     Completed    2020

 

                          Visit Plan:               Bronchitis - acute case of b

ronchitis identified. Pt has been given

antibiotics, breathing treatments as appropriate, and pt has been instructed to 
call if symptoms are not improved, or if symptoms acutely worsen.

                                                            2019

 

                                        Appointment: Bree Oleary 

WPtel:[phone]

                                        [facility]

                                              (15 min) Moderate 2019

 

             Patient Education: Patient Medication Summary                      

     Completed    2019

 

                          Visit Plan:               URI -viral -flu swab to r/o 

flu - Pt advised to increase fluids, 

vitamin C. Discussed natural and expected course of this diagnosis and need to 
alert me if symptoms do not follow expected course, or if any worse.

                                                            2019

 

                                        Appointment: Bree Oleary 

WPtel:[phone]

                                        [address] [place], [place] [id number]

[place]                                              (30 min) Complex 2019

 

             Patient Education: Patient Medication Summary                      

     Completed    2019

 

                          Visit Plan:               Post-surgical menopause - co

ntinue to taper depo dose and 

eventually off medication completely -injection today in the office.

                                                            2019

 

                                        Appointment: Bree Oleary 

WPtel:[phone]

                                        [facility]

[place]                                              (15 min) Moderate 2019

 

             Patient Education: Patient Medication Summary                      

     Completed    2019

 

                                        Appointment: Bree Oleary 

WPtel:[phone]

                                        [facility]

[place]                                              (30 min) Complex 2019

 

                          Visit Plan:               Left knee, ankle, foot pain 

- will send RX - will refer to ortho - 

The pt is to use prn antiinflammatories to manage acute pain. The patient is to 
call the office if the pain is worsening or does not improve.

                                                            2019

 

                                        Appointment: Flakita Castro 

WPtel:[phone]

                                        [facility] [place]

[place]                                              (30 min) Complex 2019

 

             Patient Education: Patient Medication Summary                      

     Completed    2019

 

                    Care Plan: Referral Order                     SNOMED-CT : 30

[id number]

                          Pending                   2019

 

                          Visit Plan:               Left knee, ankle, foot pain 

- will send RX - if no improvement will

refer to ortho - The pt is to use prn antiinflammatories to manage acute pain. 
The patient is to call the office if the pain is worsening or does not improve.

                                                            2019

 

                                        Appointment: Flakita Castro 

WPtel:[phone]

                                        [address] [place], [place] [address]

[place]                                              (30 min) Complex 2019

 

             Patient Education: Patient Medication Summary                      

     Completed    2019

 

                          Visit Plan:               Well Adult - pt was counsele

d about diet, exercise, and encouraged 

to follow a heart healthy diet and increase activity level. The patient was 
instructed to RTC yearly for well adult exams and PRN for acute illnesses. The 
pt was also instructed to have yearly labs for check of cholesterol, thyroid, 
chem panel, CBC, and renal functioning. Post-surgical menopause - I have advised
the pt that we will look for her medications from [facility] to find out what her depo 
dose is so I can wean her off of it. Anxiety - the patient has uncontrolled 
anxiety and will benefit from an SSRI on a daily basis to attempt control of the
symptoms of anxiety (tachycardia, overwhelming sensations, stress, insomnia, 
etc). stop clonidine due to hypotension

                                                            08/15/2019

 

             Patient Education: Patient Medication Summary                      

     Completed    08/15/2019

 

                                        Referral: Neo Wayne 

HPtel:[phone]

                                        [address] [facility]

[place]              Referral                        Appointment Requested  

 

             Referral: Álvaro Sellers  Referral                  Appointment Co

nfirmed  

 

             Referral: Álvaro Sellers  Referral                  Completed     







Instructions





                          Comment                   Date

 

                                        CHEST XRAY TODAY

ALBUTEROL INHALER 2 PUFFS EVERY 4-6 HOURS AS NEEDED

WILL SEND REFERRAL TO DR WAYNE FOR EGD

WILL DISCUSS NEXT FOLLOW UP AFTER CHEST XRAY AND EFFECTIVENESS OF INHALER. CALL 
WITH UPDATE IN 1 WEEK.

                                        . Chronic cough/laryngitis - has been pe

rsistent for the last 6 weeks, despite 

treatment of allergies, GERD, and URI with steroids and antibiotic. Will obtain 
CXR today and rx for Albuterol inhaler sent to pharmacy to see if improvement 
with use. 



Dysphagia - pt reports feeling as though food is getting caught in her 
esophagus. Pt was being treated for possible silent GERD as has had a persistent
cough despite improvement in sinus congestion and drainage. Per pt request will 
refer for EGD with Dr. Wayne. Pt reports family history of esophageal 
stricture. 



Allergies - pt reports sinus congestion and drainage has significantly improved 
since taking Allegra daily.             2021

 

                                        GENERIC ALLEGRA OTC 1 TABLET TWICE A DAY

 FOR 5 DAYS, THEN DECREASE TO ONCE A DAY



OMEPRAZOLE (GENERIC PRILOSEC) 20MG TAKE 1 DAILY X 14



CLORICIDIN HP OVER THE COUNTER DECONGESTANT AS NEEDED WHEN YOU FEEL MORE 
CONGESTED



MIRALAX 17GM 1 CAPFUL MIX WITH WATER DAILY

DUE FOR ANNUAL MAMMOGRAM- order faxed



DUE TO ANNUAL FASTING LABS- orders given at visit



CONTINUE XYZOL OR ZYRTEC



STEROID SHOT TODAY



PREDNISONE TABLETS TAKE 2 TABLETS DAILY X 5 DAYS



PHENERGAN W/CODEINE COUGH MEDICINE

                                        . Allergies - recent increase in nasal c

ongestion that has been worsening cough.

Has tried Claritin in the past without any relief. Discussed use of being 
aggressive with allergies to help decrease cough and loss of voice. Discussed 
use of Allegra twice a day x 5 days then decrease down to daily. BP elevated the
last 2 visits, so discouraged use of Sudafed. Discussed us of Cloricidin HP as 
needed for congestions. 



Laryngitis - secondary to acute bronchitis that has worsened over the last 
month. Cough has improved, but pt reports tightness when lying down. Discussed 
possible silent GERD due to increase in coughing. Recommended taking Omeprazole 
daily x 2 weeks. 



Acute Bronchitis - cough improved, denies chest congestion. Completed 2 rounds 
of antibiotics and 2 rounds of steroids. Discussed treat more aggressive with 
allergies and GERD. Pt encouraged to follow up in 2 weeks if no improvement. 



Slow transit constipation - encouraged use Miralax 1 capful daily, may titrate 
based on needed. 

Allergies exacerbation with cough - chronic - recommended pt to use allergy 
medication as prescribed.  Pt has been counseled as  to the appropriate use of 
the medication.  Pt to call if allergy symptoms are not controlled with the 
medication.

If using nasal spray, instructions as follows: Nasal spray- use twice daily, one
spray per nostril twice daily, after 30 minutes, rinse out nose with saline 
spray.. Use opposite hand per nostril to spray in the nasal steroid allergy 
spray.  Kenalog injection given at visit. Prednisone rx sent. Phenergan with 
codeine cough medicine sent. 



Screening mammogram - orders sent.



Anxiety - controlled. Continue prn alprazolam at bedtime and escitalopram. 



Fasting lab orders sent







                                        2021

 

                                        DUE FOR ANNUAL MAMMOGRAM- order faxed



DUE TO ANNUAL FASTING LABS- orders given at visit



CONTINUE XYZOL OR ZYRTEC



STEROID SHOT TODAY



PREDNISONE TABLETS TAKE 2 TABLETS DAILY X 5 DAYS



PHENERGAN W/CODEINE COUGH MEDICINE

                                        . Allergies exacerbation with cough - ch

ronic - recommended pt to use allergy 

medication as prescribed.  Pt has been counseled as  to the appropriate use of 
the medication.  Pt to call if allergy symptoms are not controlled with the 
medication.

If using nasal spray, instructions as follows: Nasal spray- use twice daily, one
spray per nostril twice daily, after 30 minutes, rinse out nose with saline 
spray.. Use opposite hand per nostril to spray in the nasal steroid allergy 
spray.  Kenalog injection given at visit. Prednisone rx sent. Phenergan with 
codeine cough medicine sent. 



Screening mammogram - orders sent.



Anxiety - controlled. Continue prn alprazolam at bedtime and escitalopram. 



Fasting lab orders sent





                                        10/25/2021

 

                                        . Allergies - chronic - recommended pt t

o use allergy medication as prescribed. 

Pt has been counseled as  to the appropriate use of the medication.  Pt to call 
if allergy symptoms are not controlled with the medication.

If using nasal spray, instructions as follows: Nasal spray- use twice daily, one
spray per nostril twice daily, after 30 minutes, rinse out nose with saline 
spray.. Use opposite hand per nostril to spray in the nasal steroid allergy 
spray.



Headache -will treat allergies/congestion - instructed patient to call if does 
not improve and we will order CT Head without contrast. 

                                        2020

 

                                        . Sinusitis - Pt has acute infection - p

ain in face, maxillary region, Pt 

informed to use decongestant, RX given to patient, sinus rinses also 
recommended.  Call if symptoms do not show improvement.



Allergies - chronic - recommended pt to use allergy medication as prescribed.  
Pt has been counseled as  to the appropriate use of the medication.  Pt to call 
if allergy symptoms are not controlled with the medication.

If using nasal spray, instructions as follows: Nasal spray- use twice daily, one
spray per nostril twice daily, after 30 minutes, rinse out nose with saline 
spray.. Use opposite hand per nostril to spray in the nasal steroid allergy 
spray.



Migraine - will treat for sinus infection - pt is to notify clinic if symptoms 
do not improve, if they worsen, or with any changes, questions, or concerns.  

2020

 

                                        . Anxiety - the patient has uncontrolled

 anxiety and will benefit from a short 

term dose of xanax for control of symptoms. 2020

 

                                        . Well Adult Female - exam completed.  P

ap and  breast exam completed.  Pt will 

be called with results of her testing.  She was advised to continue with yearly 
annual exams.   Safe sex practices discussed during office visit today.  Call if
any abnormal gynecologic issues during the next year, otherwise, RTC yearly or 
prn.

                                        2020

 

                                        kenherman 

                                        . Bronchitis - acute case of bronchitis 

identified.  Pt has been given 

antibiotics, breathing treatments as appropriate, and pt has been instructed to 
call if symptoms are not improved, or if symptoms acutely worsen.

                                        2019

 

                                        . URI -viral -flu swab to r/o flu - Pt a

dvised to increase fluids, vitamin C.  

Discussed natural and expected course of this diagnosis and need to alert me if 
symptoms do not follow expected course, or if any worse.

                                        2019

 

                                        . Post-surgical menopause - continue to 

taper depo dose and eventually off 

medication completely -injection today in the office. 

                                        2019

 

                                        tomorrow at 10:15 with the nurse praclouise benavides. Left knee, ankle, foot pain - 

will send RX - will refer to ortho - The pt is to use prn antiinflammatories to 
manage acute pain. The patient is to call the office if the pain is worsening or
does not improve. 



                                        2019

 

                                        . Left knee, ankle, foot pain - will sen

d RX - if no improvement will refer to 

ortho - The pt is to use prn antiinflammatories to manage acute pain. The 
patient is to call the office if the pain is worsening or does not improve. 

                                        2019

 

                                        . Well Adult - pt was counseled about di

et, exercise, and encouraged to follow a

heart healthy diet and increase activity level.  The patient was instructed to 
RTC yearly for well adult exams and PRN for acute illnesses.  The pt was also 
instructed to have yearly labs for check of cholesterol, thyroid, chem panel, 
CBC, and renal functioning.



Post-surgical menopause - I have advised the pt that we will look for her 
medications from [facility] to find out what her depo dose is so I can wean her off of 
it.



Anxiety - the patient has uncontrolled anxiety and will benefit from an SSRI on 
a daily basis to attempt control of the symptoms of anxiety (tachycardia, 
overwhelming sensations, stress, insomnia, etc). 

stop clonidine due to hypotension       08/15/2019







Medical Equipment

No Medical Equipment data



Health Concerns Section

Health Concerns data not found



Goals Section

Goals data not found



Interventions Section

Interventions data not found



Health Status Evaluations/Outcomes Section

Health Status Evaluations/Outcomes data not found



Advance Directives

No Advance Directive data

## 2021-12-28 NOTE — XMS REPORT
CCD document using C-CDA

                             Created on: 2021



AyalaRosibel vegas

External Reference #: [id number]

: 1970

Sex: Female



Demographics





                          Address                   [address]

[place]  [address]

 

                          Home Phone                [phone]

 

                          Preferred Language        Unknown

 

                          Marital Status            Unknown

 

                          Alevism Affiliation     Unknown

 

                          Race                      White

 

                          Ethnic Group              Not  or 





Author





                          Author                    Rosibel Crump

 

                          Organization              Yesica Crump MD, [facility]

 

                          Address                   [address] [place]  [address]



 

                          Phone                     [phone]







Care Team Providers





                    Care Team Member Name Role                Phone

 

                    Yesica Crump     PP                  Unavailable

 

                          CCM                       Unavailable







Summary Purpose

Interface Exchange



Insurance Providers





             Payer name   Policy type / Coverage type Covered party ID Effective

 Begin Date 

Effective End Date

 

             [facility] Commercial Insurance [id number] 

Unknown      

Unknown







Family history





Mother



                          Diagnosis                 Age At Onset

 

                          Diabetes mellitus Type 2  Unknown

 

                          Alcoholism                Unknown

 

                          Depression                Unknown







Father



                          Diagnosis                 Age At Onset

 

                          Diabetes mellitus Type 2  Unknown







Social History





                Social History Element Codes           Description     Effective

 Dates

 

                    Marital status      Unknown             

Ha                                   08/15/2019

 

                Number of children Unknown         2               08/15/2019

 

                    Employment          Unknown             Currently employed

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

 

                Tobacco history SNOMED CT: 09195686 Current some days smoker 08/

15/2019

 

                Alcohol history SNOMED CT: 267741730 Never drinks alcohol 08/15/

2019







Allergies, Adverse Reactions, Alerts





           Substance  Reaction   Codes      Entered Date Inactivated Date Status

 

           Penicillin            Unknown    08/15/2019 No Inactive Date Active







Problems





                Condition       Codes           Effective Dates Condition Status

 

                          Chronic cough             ICD-10: R05.3

ICD-9: 786.2              2021                Active

 

                          Dysphagia                 ICD-10: r13.10

ICD-9: 787.20             2021                Active

 

                          Laryngitis                ICD-10: J04.0

ICD-9: 464.00             2021                Active

 

                          Other allergic rhinitis   ICD-10: J30.89

ICD-9: 477.8              2020                Active

 

                          Acute bronchitis          ICD-10: J20.9

ICD-9: 466.0              2019                Active

 

                          Slow transit constipation ICD-10: K59.01

ICD-9: 564.01             2021                Active

 

                          Cough                     ICD-10: R05.9

ICD-9: 786.2              10/25/2021                Active

 

                          Encounter for screening mammogram for malignant neopla

sm of breast ICD-10: 

Z12.31

ICD-9: V76.12             11/10/2020                Active

 

                          Generalized anxiety disorder ICD-10: F41.9

ICD-9: 300.00             10/25/2021                Active

 

                          Menopausal and female climacteric states ICD-10: N95.1

ICD-9: 627.2              08/15/2019                Active

 

                          Encounter for general adult medical examination withou

t abnormal findings ICD-

10: Z00.00

ICD-9: V70.0              08/15/2019                Active

 

                          Screening, lipid          ICD-10: Z13.220

ICD-9: V77.91             10/22/2020                Active

 

                          Migraine without status migrainosus, not intractable, 

unspecified migraine type 

ICD-10: G43.909

ICD-9: 346.90             2020                Active

 

                          Headache                  ICD-10: R51

ICD-9: 784.0              2020                Active

 

                          Other acute sinusitis     ICD-10: J01.80

ICD-9: 461.8              2020                Active

 

                          Generalized anxiety disorder ICD-10: F41.1

ICD-9: 300.00             08/15/2019                Active

 

                                        Encounter for gynecological examination 

(general) (routine) without abnormal 

findings                                ICD-10: Z01.419

ICD-9: V72.31             2020                Active

 

                          Cough                     ICD-10: R05

ICD-9: 786.2              2019                Active

 

                          Body aches                ICD-10: R52

ICD-9: 780.96             2019                Active

 

                          Fever                     ICD-10: R50.9

ICD-9: 780.60             2019                Active

 

                          Pain in left ankle and joints of left foot ICD-10: M25

.572

ICD-9: 719.47             2019                Active

 

                          Pain in left knee         ICD-10: M25.562

ICD-9: 719.46             2019                Active







Medications





          Medication Codes     Instructions Start Date Stop Date Status    Fill 

Instructions

 

                    promethazine 6.25 mg-codeine 10 mg/5 mL syrup RxNorm: 314529

      Take 5-10 

Milliliter(s) Oral every 4-6 hours as needed cough 2021   

   Active          



 

                    escitalopram 10 mg tablet RxNorm: 575960      TAKE ONE TABLE

T BY MOUTH EVERY NIGHT AT

BEDTIME Tablet(s) Oral 12/15/2021      12/15/2021      Inactive         

 

                    promethazine 6.25 mg-codeine 10 mg/5 mL syrup RxNorm: 195470

      Take 5-10 

Milliliter(s) Oral every 4-6 hours as needed cough 12/15/2021          12/15/202

1          

Inactive                                 

 

                    promethazine 6.25 mg-codeine 10 mg/5 mL syrup RxNorm: 961796

      Take 5-10 

Milliliter(s) Oral every 4-6 hours as needed cough 12/15/2021          12/15/202

1          

Inactive                                 

 

                    albuterol sulfate HFA 90 mcg/actuation aerosol inhaler RxNor

m: 7323889     Inhale 2 

Inhalation every 4-6 hours as needed cough 2021      No Stop Date    Activ

e           

 

                    atorvastatin 20 mg tablet RxNorm: 888492      Take 1 Tablet(

s) Oral every night at 

bedtime         2021      Active           

 

                Miralax 17 gram/dose oral powder RxNorm: 661000  Take 1 scoop Or

al every day 

2021          12/15/2021          Inactive             

 

                    Allegra Allergy 180 mg tablet RxNorm: 900485      Take 1 Tab

let(s) Oral every day for

first 5 days take twice daily 2021      No Stop Date    Active           

 

                    omeprazole 20 mg tablet,delayed release RxNorm: 271558      

Take 1 Tablet(s) Oral 

every day       2021      Inactive         

 

                prednisone 20 mg tablet RxNorm: 907563  Take 2 Tablet(s) Oral ev

estevan day 

2021          2021          Inactive             

 

                    azithromycin 250 mg tablet RxNorm: 233007      Take 1 Tablet

(s) Oral every day take 2

tablets day 1, then take 1 tablet daily on days 2-5 2021

21          

Inactive                                Please disregard Cefdinir RX.

 

                cefdinir 300 mg capsule RxNorm: 045627  Take 1 Capsule(s) Oral t

wo times a day 

2021          Inactive             

 

                    azithromycin 250 mg tablet RxNorm: 133267      Take 1 Tablet

(s) Oral every day take 2

tablets day 1, then take 1 tablet daily on days 2-5 2021

21          

Inactive                                Please disregard Cefdinir RX.

 

                    promethazine 6.25 mg-codeine 10 mg/5 mL syrup RxNorm: 358021

      Take 5 

Milliliter(s) Oral every 4-6 hours as needed cough 10/25/2021          10/25/202

1          

Inactive                                 

 

                prednisone 20 mg tablet RxNorm: 294999  Take 2 Tablet(s) Oral ev

estevan day 

10/25/2021          10/29/2021          Inactive             

 

                    promethazine 6.25 mg-codeine 10 mg/5 mL syrup RxNorm: 476920

      Take 5 

Milliliter(s) Oral every 4-6 hours as needed cough 10/25/2021          11/03/202

1          

Inactive                                 

 

                    Kenalog 40 mg/mL suspension for injection RxNorm: 4089785   

  Take 1 Milliliter(s) 

Injection       10/25/2021      10/25/2021      Inactive         

 

                    alprazolam 0.5 mg tablet RxNorm: 079779      Take 1 Tablet(s

) Oral two times a day as

needed FOR ANXIETY 10/15/2021      2021      Inactive         

 

                    alprazolam 0.5 mg tablet RxNorm: 166345      1 Tablet(s) Ora

l two times a day as 

needed anxiety  2021      Inactive         

 

                    Depo-Estradiol 5 mg/mL intramuscular oil RxNorm: 209855     

 0.75 Milliliter(s) 

Intramuscular   2021      Inactive         

 

                    alprazolam 0.5 mg tablet RxNorm: 542449      1 Tablet(s) Ora

l two times a day as 

needed anxiety  2021      Inactive         

 

                    Depo-Estradiol 5 mg/mL intramuscular oil RxNorm: 896615     

 0.75 Milliliter(s) 

Intramuscular   2021      Inactive         

 

                    alprazolam 0.5 mg tablet RxNorm: 072925      1 Tablet(s) Ora

l two times a day as 

needed anxiety  2021      Inactive         

 

                    alprazolam 0.5 mg tablet RxNorm: 882178      1 Tablet(s) Ora

l two times a day as 

needed anxiety  04/15/2021      2021      Inactive         

 

                    Depo-Provera 150 mg/mL intramuscular suspension RxNorm: 1000

128     Milliliter(s) 

Intramuscular   2021      Inactive         

 

                    alprazolam 0.5 mg tablet RxNorm: 363159      1 Tablet(s) Ora

l two times a day as 

needed anxiety  2021      Inactive         

 

                    escitalopram 10 mg tablet RxNorm: 960852      TAKE ONE TABLE

T BY MOUTH EVERY NIGHT AT

BEDTIME Tablet(s) Oral 2021      Inactive         

 

                    escitalopram 5 mg tablet RxNorm: 262048      TAKE ONE TABLET

 BY MOUTH EVERY NIGHT AT 

BEDTIME         02/15/2021      2021      Inactive         

 

                    alprazolam 0.5 mg tablet RxNorm: 512973      1 Tablet(s) Ora

l two times a day as 

needed anxiety  02/15/2021      2021      Inactive         

 

                atorvastatin 20 mg tablet RxNorm: 950430  TAKE ONE TABLET BY SHELLY

TH DAILY 

2021          Inactive             

 

                    Depo-Estradiol 5 mg/mL intramuscular oil RxNorm: 301967     

 3/4 Milliliter(s) 

Intramuscular monthly 2021      Inactive         

 

                    Depo-Provera 150 mg/mL intramuscular suspension RxNorm: 1000

128     Milliliter(s) 

Intramuscular   2021      Inactive         

 

                    alprazolam 0.5 mg tablet RxNorm: 525942      1 Tablet(s) Ora

l two times a day as 

needed anxiety  2020      Inactive         

 

                    acyclovir 400 mg tablet RxNorm: 718860      TAKE ONE TABLET 

BY MOUTH FOUR TIMES A DAY

                2020      Inactive         

 

                    alprazolam 0.5 mg tablet RxNorm: 082383      1 Tablet(s) Ora

l two times a day as 

needed anxiety  2020      12/15/2020      Inactive         

 

             atorvastatin 20 mg tablet RxNorm: 494154 1 Tablet(s) Oral every day

 2020                Inactive                   

 

             atorvastatin 20 mg tablet RxNorm: 785289 1 Tablet(s) Oral every day

 2020   

02/10/2021                Inactive                   

 

                    alprazolam 0.5 mg tablet RxNorm: 485529      1 Tablet(s) Ora

l two times a day as 

needed anxiety  10/23/2020      11/15/2020      Inactive         

 

                    Depo-Provera 150 mg/mL intramuscular suspension RxNorm: 1000

128     Milliliter(s) 

Intramuscular   10/20/2020      10/20/2020      Inactive         

 

                    acyclovir 400 mg tablet RxNorm: 719654      TAKE ONE TABLET 

BY MOUTH FOUR TIMES A DAY

                10/06/2020      11/15/2020      Inactive         

 

                    alprazolam 0.5 mg tablet RxNorm: 833858      1 Tablet(s) Ora

l two times a day as 

needed anxiety  2020      10/21/2020      Inactive         

 

                    alprazolam 0.5 mg tablet RxNorm: 189488      1 Tablet(s) Ora

l two times a day as 

needed anxiety  2020      Inactive         

 

                escitalopram 5 mg tablet RxNorm: 246579  1 Tablet(s) Oral every 

night at bedtime 

2020          Inactive             

 

             prednisone 20 mg tablet RxNorm: 136652 2 Tablet(s) Oral every day 0

2020                Inactive                   

 

                    Depo-Provera 150 mg/mL intramuscular suspension RxNorm: 1000

128     0.75 

Milliliter(s) Intramuscular 2020      Inactive         

 

                    Kenalog 40 mg/mL suspension for injection RxNorm: 3665527   

  1 Milliliter(s) 

Injection       2020      Inactive         

 

                Zithromax Z-Timur 250 mg tablet RxNorm: 046087  Tablet(s) Oral as 

directed 

2020          Inactive             

 

                    alprazolam 0.5 mg tablet RxNorm: 740278      1 Tablet(s) Ora

l two times a day as 

needed anxiety  2020      Inactive         

 

                    alprazolam 0.5 mg tablet RxNorm: 534260      1 Tablet(s) Ora

l two times a day as 

needed anxiety  2020      Inactive         

 

                    Depo-Provera 150 mg/mL intramuscular suspension RxNorm: 1000

128     Milliliter(s) 

Intramuscular   2020      Inactive         

 

                    acyclovir 400 mg tablet RxNorm: 089505      1 Tablet(s) Oral

 four times a day fever 

blisters        2020      Inactive         

 

                    Depo-Provera 150 mg/mL intramuscular suspension RxNorm: 1000

128     Milliliter(s) 

Intramuscular   2020      Inactive         

 

                pravastatin 10 mg tablet RxNorm: 320808  1 Tablet(s) Oral every 

night at bedtime 

2020          Inactive             

 

                pravastatin 10 mg tablet RxNorm: 181458  1 Tablet(s) Oral every 

night at bedtime 

2020          Inactive             

 

                    Depo-Estradiol 5 mg/mL intramuscular oil RxNorm: 440412     

 3/4 Milliliter(s) 

Intramuscular monthly 2020      Inactive         

 

                    Kenalog 40 mg/mL suspension for injection RxNorm: 4208912   

  Milliliter(s) 

Injection       2019      Inactive         

 

                    Phenergan with Codeine Syrup RxNorm:             5-10 Millil

iter(s) Oral Every 6 hrs as 

needed          2019      Inactive         

 

             prednisone 20 mg tablet RxNorm: 516047 2 Tablet(s) Oral every day 1

2019                Inactive                  start tomorrow

 

                    doxycycline hyclate 100 mg tablet RxNorm: 4868002     1 Tabl

et(s) Oral two times a 

day             2019      Inactive        start if symptom

s do not improve

 

                    Depo-Estradiol 5 mg/mL intramuscular oil RxNorm: 860816     

 3/4 Milliliter(s) 

Intramuscular monthly 2019      Inactive         

 

                    Depo-Estradiol 5 mg/mL intramuscular oil RxNorm: 362548     

 3/4 Milliliter(s) 

Intramuscular monthly 10/31/2019      2019      Inactive         

 

                    gabapentin 100 mg capsule RxNorm: 417849      1 Capsule(s) O

ral every night at 

bedtime         10/16/2019      2019      Inactive         

 

             gabapentin 100 mg capsule RxNorm: 558329 1 Capsule(s) PO QHS 09/12/

2019   

10/11/2019                Inactive                   

 

           naproxen 500 mg tablet RxNorm: 046455 1 Tablet(s) PO BID 2019 0

2019 

Inactive                                 

 

             escitalopram 5 mg tablet RxNorm: 349349 1 Tablet(s) PO QHS 08/15/20

19   2019

                          Inactive                   

 

          CoQ-10 oral RxNorm: 97662 oral      2021           Active     

 

             ranitidine 150 mg capsule RxNorm: 710435 1 Capsule(s) PO BID 2019                Inactive                   

 

             eszopiclone 1 mg tablet RxNorm: 516846 Tablet(s) PO as needed 2020                Inactive                   

 

                acyclovir 400 mg tablet RxNorm: 355016  1 Tablet(s) PO PRN fever

 blisters 

2020          Inactive             

 

             clonidine HCl 0.1 mg tablet RxNorm: 626606 Tablet(s) PO as needed 0

8/15/2019   

2019                Inactive                   







Medication Administered





             Medication   Codes        Instructions Start Date   Status

 

                Kenalog 40 mg/mL suspension for injection RxNorm: 9660152 1Milli

liter     10/25/2021

                                        No longer Active

 

                Depo-Estradiol 5 mg/mL intramuscular oil RxNorm: 112663  0.75Mil

liliter  

2021                              No longer Active

 

                Depo-Estradiol 5 mg/mL intramuscular oil RxNorm: 564056  0.75Mil

liliter  

2021                              No longer Active

 

                Depo-Provera 150 mg/mL intramuscular suspension RxNorm: 1380993 

Milliliter      

2021                              No longer Active

 

                Depo-Provera 150 mg/mL intramuscular suspension RxNorm: 8841629 

Milliliter      

2021                              No longer Active

 

                Depo-Provera 150 mg/mL intramuscular suspension RxNorm: 9017148 

Milliliter      

10/20/2020                              No longer Active

 

                Depo-Provera 150 mg/mL intramuscular suspension RxNorm: 3971102 

0.75Milliliter  

2020                              No longer Active

 

                Kenalog 40 mg/mL suspension for injection RxNorm: 2986973 1Milli

liter     2020

                                        No longer Active

 

                Depo-Provera 150 mg/mL intramuscular suspension RxNorm: 4912987 

Milliliter      

2020                              No longer Active

 

                Depo-Provera 150 mg/mL intramuscular suspension RxNorm: 6713243 

Milliliter      

2020                              No longer Active

 

                Depo-Estradiol 5 mg/mL intramuscular oil RxNorm: 812908  3/4Mill

ilitermonthly 

2020                              Active

 

             Kenalog 40 mg/mL suspension for injection RxNorm: 2666315 Millilite

r   2019   

No longer Active

 

                Depo-Estradiol 5 mg/mL intramuscular oil RxNorm: 784053  3/4Mill

ilitermonthly 

2019                              Active







Immunizations

No Immunization data



Results





          Observation Observation Code Item      Item Code Result    Date      S

ervice Location

 

          Cbc With Differential Ord2      WBC                 11.77 K/ul 

021 Unknown

 

          Cbc With Differential Ord2      RBC                 4.50 M/ul 20

21 Unknown

 

          Cbc With Differential Ord2      HGB                 13.6 g/dl 20

21 Unknown

 

          Cbc With Differential Ord2      Neut%               69.1 %    20

21 Unknown

 

          Cbc With Differential Ord2      HCT                 43.0 %    20

21 Unknown

 

          Cbc With Differential Ord2      MCV                 95.6 fl   20

21 Unknown

 

          Cbc With Differential Ord2      Lymph%              22.0 %    20

21 Unknown

 

          Cbc With Differential Ord2      MCH                 30.2 pg   20

21 Unknown

 

          Cbc With Differential Ord2      Mono%               6.0 %     20

21 Unknown

 

          Cbc With Differential Ord2      Eos%                2.6 %     20

21 Unknown

 

          Cbc With Differential Ord2      MCHC                31.6 pg   20

21 Unknown

 

          Cbc With Differential Ord2      PLT                 288 K/ul  20

21 Unknown

 

          Cbc With Differential Ord2      Baso%               0.3 %     20

21 Unknown

 

          Cbc With Differential Ord2      RDW                 13.5 %    20

21 Unknown

 

          Cbc With Differential Ord2      Neut ABS#           8.13 K/ul 20

21 Unknown

 

          Cbc With Differential Ord2      Lymph ABS#           2.59 K/ul 

021 Unknown

 

          Cbc With Differential Ord2      Mono ABS#           0.7 K/ul  20

21 Unknown

 

          Cbc With Differential Ord2      Eos ABS#            0.3 K/ul  20

21 Unknown

 

          Cbc With Differential Ord2      Baso ABS#           0.0 K/ul  20

21 Unknown

 

          Comp Metabolic Jhx714    NA                  142 mEq/L 2021 Unkn

own

 

          Comp Metabolic Oup447    K                   4.4 mEq/L 2021 Unkn

own

 

          Comp Metabolic Pui661    CL                  101 mEq/L 2021 Unkn

own

 

          Comp Metabolic Iod859    CO2                 32.0 mEq/L 2021 Unk

nown

 

          Comp Metabolic Mok374    ANION GAP           13        2021 Un[facility]

own

 

          Comp Metabolic Mnm662    GLUCOSE             89 mg/dL  2021 Unkn

own

 

          Comp Metabolic Hyq322    Creat               0.7 mg/dL 2021 Unkn

own

 

          Comp Metabolic Dqn002    eGFR                90 ml/min/1.73m2 20

21 Unknown

 

          Comp Metabolic Yhr959    BUN                 19 mg/dL  2021 Unkn

own

 

          Comp Metabolic Zxr380    B/C Ratio           26.4 Ratio 2021 Unk

nown

 

          Comp Metabolic Rhm476    CALCIUM             9.2 mg/dL 2021 Unkn

own

 

          Comp Metabolic Cxm241    ALK PHOS            85 U/L    2021 Unkn

own

 

          Comp Metabolic Vpv139    AST(SGOT)           16 U/L    2021 Unkn

own

 

          Comp Metabolic Xkd396    ALT(SGPT)           19 U/L    2021 Unkn

own

 

          Comp Metabolic Syw822    BILI T              0.6 mg/dL 2021 Unkn

own

 

          Comp Metabolic Qoc758    ALBUMIN             4.2 g/dL  2021 Unkn

own

 

          Comp Metabolic Inm584    TPRO                7.1 g/dL  2021 Unkn

own

 

          Comp Metabolic Tye901    GLOB                2.9 g/dL  2021 Unkn

own

 

          Comp Metabolic Ace390    A/G Ratio           1.5 Ratio 2021 Unkn

own

 

          Comp Metabolic Zvt103    Osmo                285 mOsmo 2021 Unkn

own

 

          Tsh       Ord6      TSH (3rd IS)           1.14 uIU/mL 2021 Unkn

own

 

          Lipid     Ord30     CHOL                233 mg/dL 2021 Unknown

 

          Lipid     Ord30     HDL                 54.0 mg/dl 2021 Unknown

 

          Lipid     Ord30     TRIG                101 mg/dL 2021 Unknown

 

          Lipid     Ord30     LDL                 159 mg/dL 2021 Unknown

 

          Lipid     Ord30     C/HDL               4.3 Ratio 2021 Unknown

 

          Lipid     Ord30     CHOL                249 mg/dL 10/22/2020 Unknown

 

          Lipid     Ord30     HDL                 55.0 mg/dl 10/22/2020 Unknown

 

          Lipid     Ord30     TRIG                82 mg/dL  10/22/2020 Unknown

 

          Lipid     Ord30     LDL                 178 mg/dL 10/22/2020 Unknown

 

          Lipid     Ord30     C/HDL               4.5 Ratio 10/22/2020 Unknown

 

          Comp Metabolic Nre093    NA                  138 mEq/L 10/22/2020 [facility]

own

 

          Comp Metabolic Cri060    K                   4.5 mEq/L 10/22/2020 [facility]

own

 

          Comp Metabolic Beo406    CL                  103 mEq/L 10/22/2020 [facility]

own

 

          Comp Metabolic Mab062    CO2                 28.0 mEq/L 10/22/2020 [facility]

nown

 

          Comp Metabolic Gfx107    ANION GAP           12        10/22/2020 [facility]

own

 

          Comp Metabolic Gbg094    GLUCOSE             90 mg/dL  10/22/2020 [facility]

own

 

          Comp Metabolic Grb063    Creat               0.7 mg/dL 10/22/2020 [facility]

own

 

          Comp Metabolic Ojk132    eGFR                88 ml/min/1.73m2 10/22/20

20 Unknown

 

          Comp Metabolic Vtl704    BUN                 20 mg/dL  10/22/2020 [facility]

own

 

          Comp Metabolic Tng171    B/C Ratio           27.0 Ratio 10/22/2020 [facility]

nown

 

          Comp Metabolic Kgc892    CALCIUM             9.5 mg/dL 10/22/2020 [facility]

own

 

          Comp Metabolic Fpw346    ALK PHOS            87 U/L    10/22/2020 [facility]

own

 

          Comp Metabolic Zgp199    AST(SGOT)           17 U/L    10/22/2020 [facility]

own

 

          Comp Metabolic Doi219    ALT(SGPT)           20 U/L    10/22/2020 [facility]

own

 

          Comp Metabolic Tgg133    BILI T              0.6 mg/dL 10/22/2020 [facility]

own

 

          Comp Metabolic Apt411    ALBUMIN             4.5 g/dL  10/22/2020 [facility]

own

 

          Comp Metabolic Mwt727    TPRO                7.6 g/dL  10/22/2020 [facility]

own

 

          Comp Metabolic Ami730    GLOB                3.1 g/dL  10/22/2020 [facility]

own

 

          Comp Metabolic Aqj540    A/G Ratio           1.5 Ratio 10/22/2020 [facility]

own

 

          Comp Metabolic Abn770    Osmo                278 mOsmo 10/22/2020 [facility]

own

 

          Cbc With Differential Ord2      WBC                 10.56 K/ul 10/22/2

020 Unknown

 

          Cbc With Differential Ord2      RBC                 4.44 M/ul 10/22/20

20 Unknown

 

          Cbc With Differential Ord2      HGB                 14.0 g/dl 10/22/20

20 Unknown

 

          Cbc With Differential Ord2      HCT                 42.8 %    10/22/20

20 Unknown

 

          Cbc With Differential Ord2      Neut%               73.9 %    10/22/20

20 Unknown

 

          Cbc With Differential Ord2      MCV                 96.4 fl   10/22/20

20 Unknown

 

          Cbc With Differential Ord2      Lymph%              18.2 %    10/22/20

20 Unknown

 

          Cbc With Differential Ord2      MCH                 31.5 pg   10/22/20

20 Unknown

 

          Cbc With Differential Ord2      Mono%               5.4 %     10/22/20

20 Unknown

 

          Cbc With Differential Ord2      MCHC                32.7 pg   10/22/20

20 Unknown

 

          Cbc With Differential Ord2      Eos%                2.2 %     10/22/20

20 Unknown

 

          Cbc With Differential Ord2      PLT                 258 K/ul  10/22/20

20 Unknown

 

          Cbc With Differential Ord2      Baso%               0.3 %     10/22/20

20 Unknown

 

          Cbc With Differential Ord2      RDW                 13.4 %    10/22/20

20 Unknown

 

          Cbc With Differential Ord2      Neut ABS#           7.81 K/ul 10/22/20

20 Unknown

 

          Cbc With Differential Ord2      Lymph ABS#           1.92 K/ul 10/22/2

020 Unknown

 

          Cbc With Differential Ord2      Mono ABS#           0.6 K/ul  10/22/20

20 Unknown

 

          Cbc With Differential Ord2      Eos ABS#            0.2 K/ul  10/22/20

20 Unknown

 

          Cbc With Differential Ord2      Baso ABS#           0.0 K/ul  10/22/20

20 Unknown

 

          Tsh       Ord6      TSH (3rd IS)           1.38 uIU/mL 2020 [facility]

[facility]

 

          Comp Metabolic Ill755    NA                  141 mEq/L 2020 [facility]

[facility]

 

          Comp Metabolic Yev546    K                   4.3 mEq/L 2020 [facility]

[facility]

 

          Comp Metabolic Oqi746    CL                  104 mEq/L 2020 [facility]

[facility]

 

          Comp Metabolic Lro510    CO2                 29.0 mEq/L 2020 [facility]

nown

 

          Comp Metabolic Wgj956    ANION GAP           12        2020 [facility]

[facility]

 

          Comp Metabolic Has556    GLUCOSE             83 mg/dL  2020 [facility]

[facility]

 

          Comp Metabolic Qja822    Creat               0.7 mg/dL 2020 [facility]

[facility]

 

          Comp Metabolic Siy977    eGFR                102 ml/min/1.73m2 

020 Unknown

 

          Comp Metabolic Ojh647    BUN                 16 mg/dL  2020 [facility]

[facility]

 

          Comp Metabolic Ptt291    B/C Ratio           24.6 Ratio 2020 [facility]

[facility]

 

          Comp Metabolic Uhl182    CALCIUM             9.3 mg/dL 2020 [facility]

[facility]

 

          Comp Metabolic Nbf716    ALK PHOS            79 U/L    2020 [facility]

[facility]

 

          Comp Metabolic Ket896    AST(SGOT)           19 U/L    2020 [facility]

[facility]

 

          Comp Metabolic Diu345    ALT(SGPT)           26 U/L    2020 [facility]

[facility]

 

          Comp Metabolic Qbu913    BILI T              0.5 mg/dL 2020 [facility]

[facility]

 

          Comp Metabolic Bmy655    ALBUMIN             4.2 g/dL  2020 [facility]

[facility]

 

          Comp Metabolic Xns261    TPRO                7.0 g/dL  2020 [facility]

[facility]

 

          Comp Metabolic Nxw413    GLOB                2.8 g/dL  2020 [facility]

own

 

          Comp Metabolic Xkn902    A/G Ratio           1.5 Ratio 2020 [facility]

own

 

          Comp Metabolic Eqp497    Osmo                282 mOsmo 2020 [facility]

own

 

          Cbc With Differential Ord2      WBC                 8.94 K/ul 20

20 Unknown

 

          Cbc With Differential Ord2      RBC                 4.34 M/ul 20

20 Unknown

 

          Cbc With Differential Ord2      HGB                 13.3 g/dl 20

20 Unknown

 

          Cbc With Differential Ord2      HCT                 40.7 %    20

20 Unknown

 

          Cbc With Differential Ord2      Neut%               71.8 %    20

20 Unknown

 

          Cbc With Differential Ord2      MCV                 93.8 fl   20

20 Unknown

 

          Cbc With Differential Ord2      Lymph%              19.5 %    20

20 Unknown

 

          Cbc With Differential Ord2      MCH                 30.6 pg   20

20 Unknown

 

          Cbc With Differential Ord2      Mono%               6.2 %     20

20 Unknown

 

          Cbc With Differential Ord2      Eos%                2.2 %     20

20 Unknown

 

          Cbc With Differential Ord2      MCHC                32.7 pg   20

20 Unknown

 

          Cbc With Differential Ord2      Baso%               0.3 %     20

20 Unknown

 

          Cbc With Differential Ord2      PLT                 262 K/ul  20

20 Unknown

 

          Cbc With Differential Ord2      RDW                 13.4 %    20

20 Unknown

 

          Cbc With Differential Ord2      Neut ABS#           6.42 K/ul 20

20 Unknown

 

          Cbc With Differential Ord2      Lymph ABS#           1.74 K/ul 

020 Unknown

 

          Cbc With Differential Ord2      Mono ABS#           0.6 K/ul  20

20 Unknown

 

          Cbc With Differential Ord2      Eos ABS#            0.2 K/ul  20

20 Unknown

 

          Cbc With Differential Ord2      Baso ABS#           0.0 K/ul  20

20 Unknown

 

          Lipid     Ord30     CHOL                240 mg/dL 2020 Unknown

 

          Lipid     Ord30     HDL                 57.0 mg/dl 2020 Unknown

 

          Lipid     Ord30     TRIG                80 mg/dL  2020 Unknown

 

          Lipid     Ord30     LDL                 167 mg/dL 2020 Unknown

 

          Lipid     Ord30     C/HDL               4.2 Ratio 2020 Unknown

 

          C A/B FLU [id number]   Influenza A Scr           Negative  2019 [facility]

[facility]

 

          C A/B FLU [id number]   Influenza B Scr           Negative  2019 [facility]

[facility]own

 

           C A/B FLU  [id number]    Influenza Intrp B AG:PRID:PT:NOSE:NOM:IF       

     See Footnote  

2019                              Unknown







Procedures





                    Procedure           Codes               Date

 

                    THER/PROPH/DIAG INJ SC/IM CPT-4: 72350        2021

 

                    THER/PROPH/DIAG INJ SC/IM CPT-4: 27434        2021

 

                    THER/PROPH/DIAG INJ SC/IM CPT-4: 10122        2021

 

                    THER/PROPH/DIAG INJ SC/IM CPT-4: 33677        2021

 

                    THER/PROPH/DIAG INJ SC/IM CPT-4: 36329        10/20/2020

 

                    THER/PROPH/DIAG INJ SC/IM CPT-4: 48692        2020

 

                    TRIAMCINOLONE ACET INJ NOS 10 mg CPT-4:         

020

 

                    THER/PROPH/DIAG INJ SC/IM CPT-4: 01022        2020

 

                    THER/PROPH/DIAG INJ SC/IM CPT-4: 51956        2020

 

                    THER/PROPH/DIAG INJ SC/IM CPT-4: 81004        2020

 

                    TRIAMCINOLONE ACET INJ NOS 10 mg CPT-4:         

019

 

                    THER/PROPH/DIAG INJ SC/IM CPT-4: 40230        2019







Vital Signs





                          Date                      Vital

 

                2021      Blood Pressure 1: 110/80 Code: 8480-6 BMI: 30.2 

Code: 18095-4 Heart 

Rate 1: 88 bpm      Height: 5'2" Code: 8302-2 SpO2: 98%           Temperature: 3

6.8 (C) / 98.2 

(F)                                     Weight: 165 lbs  Code: 39660-3

 

                2021      Blood Pressure 1: 156/82 Code: 8480-6 Heart Rate

 1: 81 bpm Height: 

5'2" Code: 8302-2   Height: 5'2" Code: 8302-2 SpO2: 99%           Temperature: 3

6.2 (C) / 

97.1 (F)                                Weight:  Code: 82159-4

 

                10/25/2021      Blood Pressure 1: 142/68 Code: 8480-6 BMI: 30.7 

Code: 48868-5 Heart 

Rate 1: 63 bpm      Height: 5'2" Code: 8302-2 SpO2: 97%           Temperature: 3

6.4 (C) / 97.6 

(F)                                     Weight: 168 lbs  Code: 63658-4

 

                2020      Blood Pressure 1: 130/80 Code: 8480-6 BMI: 28.5 

Code: 22918-8 Heart 

Rate 1: 69 bpm      Height: 5'2" Code: 8302-2 SpO2: 97%           Temperature: 3

6.9 (C) / 98.4 

(F)                                     Weight: 156 lbs  Code: 03321-8

 

                2020      Blood Pressure 1: 128/78 Code: 8480-6 Heart Rate

 1: 74 bpm Height:  

Code: 8302-2              SpO2: 98%                 Weight:  Code: 54468-0

 

                    2020          Height:  Code: 8302-2 Weight:  Code: 294

63-7

 

                2020      Blood Pressure 1: 132/80 Code: 8480-6 BMI: 28.9 

Code: 86907-7 Heart 

Rate 1: 68 bpm      Height: 5'2" Code: 8302-2 SpO2: 97%           Weight: 158 lb

s  Code: 

96643-9

 

                2019      Blood Pressure 1: 110/60 Code: 8480-6 BMI: 28.5 

Code: 72395-2 Heart 

Rate 1: 76 bpm      Height: 5'2" Code: 8302-2 SpO2: 98%           Temperature: 3

6.9 (C) / 98.5 

(F)                                     Weight: 156 lbs  Code: 16505-2

 

             2019   Blood Pressure 1: 124/80 Code: 8480-6 Heart Rate 1: 81

 bpm SpO2: 98%    

Temperature: 36.7 (C) / 98.1 (F)

 

                2019      Blood Pressure 1: 130/78 Code: 8480-6 BMI: 28.0 

Code: 16624-7 Heart 

Rate 1: 68 bpm      Height: 5'2" Code: 8302-2 SpO2: 98%           Weight: 153 lb

s  Code: 

01259-2

 

                2019      Heart Rate 1: 68 bpm Height:  Code: 8302-2 Weigh

t:  Code: 95282-5

 

                2019      Blood Pressure 1: 132/74 Code: 8480-6 BMI: 27.4 

Code: 42432-8 Heart 

Rate 1: 66 bpm      Height: 5'2" Code: 8302-2 SpO2: 98%           Weight: 150 lb

s  Code: 

09685-2

 

                08/15/2019      Blood Pressure 1: 104/60 Code: 8480-6 BMI: 27.5 

Code: 49793-4 Heart 

Rate 1: 66 bpm      Height: 5'2" Code: 8302-2 SpO2: 98%           Weight: 150 lb

s 5 oz Code: 

35010-6







Functional Status

No Functional Status data



Reason For Visit





                    Reason For Visit    Effective Dates     Notes

 

                    cough               2021           

 

                    cough               2021           

 

                    sinus congestion    10/25/2021           

 

                    headache            2020           

 

                    headache            2020           

 

                    well woman exam (40-65 years) 2020           

 

                    cough               2019           

 

                    fever               2019           

 

                    menopausal symptoms 2019           

 

                    lower leg pain      2019           

 

                    lower leg pain      2019           

 

                    anxiety             08/15/2019           







Encounters





             Encounter    Performer    Location     Codes        Date

 

                                         35551 EST. PATIENT, LEVEL IV

Diagnosis: Chronic cough[ICD10: R05.3]

Diagnosis: Laryngitis[ICD10: J04.0]

Diagnosis: Other allergic rhinitis[ICD10: J30.89]

Diagnosis: Dysphagia[ICD10: r13.10] Eulalia Crump MD, [facility]

PT-4: 

43312                                   2021

 

                                        [phone]) 61214 EST. PATIENT, LEVEL IV

Diagnosis: Acute bronchitis[ICD10: J20.9]

Diagnosis: Laryngitis[ICD10: J04.0]

Diagnosis: Other allergic rhinitis[ICD10: J30.89]

Diagnosis: Slow transit constipation[ICD10: K59.01] [facility]                   CPT-4: 07639              2021

 

                                        [phone]) 12382 EST. PATIENT, LEVEL IV

Diagnosis: Generalized anxiety disorder[ICD10: F41.9]

Diagnosis: Cough[ICD10: R05.9]

Diagnosis: Other allergic rhinitis[ICD10: J30.89]

Diagnosis: Encounter for screening mammogram for malignant neoplasm of 
breast[ICD10: Z12.31] [facility] CPT-4: 78014    

10/25/2021

 

                                        [phone]) 36265 EST. PATIENT, LEVEL III

Diagnosis: Headache[ICD10: R51]

Diagnosis: Other allergic rhinitis[ICD10: J30.89] Bree Gutierres MD, [facility]          CPT-4: 30054              2020

 

                                        77365 EST. PATIENT, LEVEL III

Diagnosis: Other acute sinusitis[ICD10: J01.80]

Diagnosis: Other allergic rhinitis[ICD10: J30.89]

Diagnosis: Migraine without status migrainosus, not intractable, unspecified 
migraine type[ICD10: G43.909]

Diagnosis: Menopausal and female climacteric states[ICD10: N95.1] Flakita Crump MD, [facility]    CPT-4: 53802              2020

 

                                        [phone]) 06096 EST. PATIENT, LEVEL III

Diagnosis: Generalized anxiety disorder[ICD10: F41.1] Yesica Crump MD, [facility]          CPT-4: 20153              2020

 

                                        (23159) PREV VISIT EST AGE 40-64

Diagnosis: Encounter for gynecological examination (general) (routine) without 
abnormal findings[ICD10: Z01.419] Bree Crump MD, [facility] CPT

-4:

54650                                   2020

 

                                        [phone]) 09283 EST. PATIENT, LEVEL III

Diagnosis: Cough[ICD10: R05]

Diagnosis: Acute bronchitis[ICD10: J20.9] Bree carrasco MD, 

[facility]                       CPT-4: 58134              2019

 

                                        [phone]) 67009 EST. PATIENT, LEVEL III

Diagnosis: Fever[ICD10: R50.9]

Diagnosis: Body aches[ICD10: R52] Bree Crump MD, [facility] CPT

-4:

85178                                   2019

 

                                        [phone]) 75472 EST. PATIENT, LEVEL III

Diagnosis: Menopausal and female climacteric states[ICD10: N95.1] Bree Crump MD, [facility] CPT-4: 69621        2019

 

                                        62193 EST. PATIENT, LEVEL III

Diagnosis: Pain in left ankle and joints of left foot[ICD10: M25.572]

Diagnosis: Pain in left knee[ICD10: M25.562] [facility]

                          CPT-4: 43972              2019

 

                                        01379 EST. PATIENT, LEVEL III

Diagnosis: Pain in left ankle and joints of left foot[ICD10: M25.572]

Diagnosis: Pain in left knee[ICD10: M25.562] [facility]

                          CPT-4: 78329              2019

 

                                        ([id number]) PREV VISIT NEW AGE 40-64

Diagnosis: Encounter for general adult medical examination without abnormal 
findings[ICD10: Z00.00] [facility] CPT-4: 54341    

08/15/2019







Plan of Care





             Planned Activity Notes        Codes        Status       Date

 

                          Visit Plan:               Chronic cough/laryngitis - h

as been persistent for the last 6 

weeks, despite treatment of allergies, GERD, and URI with steroids and 
antibiotic. Will obtain CXR today and rx for Albuterol inhaler sent to pharmacy 
to see if improvement with use. Dysphagia - pt reports feeling as though food is
getting caught in her esophagus. Pt was being treated for possible silent GERD 
as has had a persistent cough despite improvement in sinus congestion and 
drainage. Per pt request will refer for EGD with Dr. Wayne. Pt reports family 
history of esophageal stricture. Allergies - pt reports sinus congestion and dra monae has significantly improved since taking Allegra daily.

                                                            2021

 

                                        Appointment: Eulalia Osman 

WPtel:[phone]

                                        [facility]

[place]                                              (30 min) Complex 2021

 

             Patient Education: Patient Medication Summary                      

     Completed    2021

 

                    Care Plan: CHEST X-RAY 2VW FRONTAL&LATL                     

LOINC : 03688-9

                          Pending                   2021

 

                          Visit Plan:               Allergies - recent increase 

in nasal congestion that has been 

worsening cough. Has tried Claritin in the past without any relief. Discussed 
use of being aggressive with allergies to help decrease cough and loss of voice.
Discussed use of Allegra twice a day x 5 days then decrease down to daily. BP 
elevated the last 2 visits, so discouraged use of Sudafed. Discussed us of 
Cloricidin HP as needed for congestions. Laryngitis - secondary to acute 
bronchitis that has worsened over the last month. Cough has improved, but pt 
reports tightness when lying down. Discussed possible silent GERD due to increas
e in coughing. Recommended taking Omeprazole daily x 2 weeks. Acute Bronchitis -
cough improved, denies chest congestion. Completed 2 rounds of antibiotics and 2
rounds of steroids. Discussed treat more aggressive with allergies and GERD. Pt 
encouraged to follow up in 2 weeks if no improvement. Slow transit constipation 
- encouraged use Miralax 1 capful daily, may titrate based on needed. Allergies 
exacerbation with cough - chronic - recommended pt to use allergy medication as 
prescribed. Pt has been counseled as to the appropriate use of the medication. 
Pt to call if allergy symptoms are not controlled with the medication. If using 
nasal spray, instructions as follows: Nasal spray- use twice daily, one spray 
per nostril twice daily, after 30 minutes, rinse out nose with saline spray.. 
Use opposite hand per nostril to spray in the nasal steroid allergy spray. 
Kenalog injection given at visit. Prednisone rx sent. Phenergan with codeine 
cough medicine sent. Screening mammogram - orders sent. Anxiety - controlled. 
Continue prn alprazolam at bedtime and escitalopram. Fasting lab orders sent

                                                            2021

 

                                        Appointment: Eulalia Osman 

WPtel:[phone]

                                        [facility]

[place]                                              (30 min) Complex 2021

 

             Patient Education: Patient Medication Summary                      

     Completed    2021

 

             Patient Education: Patient Medication Summary                      

     Completed    2021

 

                          Visit Plan:               Allergies exacerbation with 

cough - chronic - recommended pt to use

allergy medication as prescribed. Pt has been counseled as to the appropriate 
use of the medication. Pt to call if allergy symptoms are not controlled with 
the medication. If using nasal spray, instructions as follows: Nasal spray- use 
twice daily, one spray per nostril twice daily, after 30 minutes, rinse out nose
with saline spray.. Use opposite hand per nostril to spray in the nasal steroid 
allergy spray. Kenalog injection given at visit. Prednisone rx sent. Phenergan 
with codeine cough medicine sent. Screening mammogram - orders sent. Anxiety - 
controlled. Continue prn alprazolam at bedtime and escitalopram. Fasting lab 
orders sent

                                                            10/25/2021

 

                                        Appointment: Eulalia Osman 

WPtel:[phone]

                                        [facility]

[place]                                              (30 min) Complex 10/25/2021

 

             Patient Education: Patient Medication Summary                      

     Completed    10/25/2021

 

             Patient Education: prednisone- [employer] [id number]         

                  Completed    

10/25/2021

 

             Appointment:                            Injection    2021

 

             Patient Education: Patient Medication Summary                      

     Completed    2021

 

             Appointment:                            Injection    2021

 

             Patient Education: Patient Medication Summary                      

     Completed    2021

 

             Appointment:                            Injection    2021

 

             Appointment:                            Injection    2021

 

             Patient Education: Patient Medication Summary                      

     Completed    2021

 

             Appointment:                            Injection    2021

 

             Patient Education: Patient Medication Summary                      

     Completed    2021

 

             Patient Education: Patient Medication Summary                      

     Completed    11/10/2020

 

             Patient Education: Patient Medication Summary                      

     Completed    10/22/2020

 

             Appointment:                            Injection    10/20/2020

 

             Patient Education: Patient Medication Summary                      

     Completed    10/20/2020

 

                          Visit Plan:               Allergies - chronic - recomm

ended pt to use allergy medication as 

prescribed. Pt has been counseled as to the appropriate use of the medication. 
Pt to call if allergy symptoms are not controlled with the medication. If using 
nasal spray, instructions as follows: Nasal spray- use twice daily, one spray 
per nostril twice daily, after 30 minutes, rinse out nose with saline spray.. 
Use opposite hand per nostril to spray in the nasal steroid allergy spray. 
Headache -will treat allergies/congestion - instructed patient to call if does 
not improve and we will order CT Head without contrast.

                                                            2020

 

                                        Appointment: Bree Oleary 

WPtel:[phone]

                                        [address] [place], [place] [address]

                                              (15 min) Moderate 2020

 

             Patient Education: Patient Medication Summary                      

     Completed    2020

 

                          Visit Plan:               Sinusitis - Pt has acute inf

ection - pain in face, maxillary 

region, Pt informed to use decongestant, RX given to patient, sinus rinses also 
recommended. Call if symptoms do not show improvement. Allergies - chronic - 
recommended pt to use allergy medication as prescribed. Pt has been counseled as
to the appropriate use of the medication. Pt to call if allergy symptoms are not
controlled with the medication. If using nasal spray, instructions as follows: 
Nasal spray- use twice daily, one spray per nostril twice daily, after 30 
minutes, rinse out nose with saline spray.. Use opposite hand per nostril to 
spray in the nasal steroid allergy spray. Migraine - will treat for sinus 
infection - pt is to notify clinic if symptoms do not improve, if they worsen, 
or with any changes, questions, or concerns.

                                                            2020

 

                                        Appointment: Flakita Castro 

WPtel:[phone]

                                        [address] [facility]

                                              (30 min) Complex 2020

 

             Patient Education: Patient Medication Summary                      

     Completed    2020

 

                          Visit Plan:               Anxiety - the patient has un

controlled anxiety and will benefit 

from a short term dose of xanax for control of symptoms.

                                                            2020

 

                                        Appointment: Yesica Crump 

WPtel:[phone]

                                        [facility]                                              TeleHealth      2020

 

             Patient Education: Patient Medication Summary                      

     Completed    2020

 

             Appointment:                            Injection    2020

 

             Patient Education: Patient Medication Summary                      

     Completed    2020

 

             Appointment:                            Injection    2020

 

             Appointment:                            Injection    2020

 

             Patient Education: Patient Medication Summary                      

     Completed    2020

 

                                        Appointment: Yesica Crump 

WPtel:[phone]

                                        [facility]

                                              Well Woman      03/10/2020

 

                          Visit Plan:               Well Adult Female - exam com

pleted. Pap and breast exam completed. 

Pt will be called with results of her testing. She was advised to continue with 
yearly annual exams. Safe sex practices discussed during office visit today. 
Call if any abnormal gynecologic issues during the next year, otherwise, RTC 
yearly or prn.

                                                            2020

 

                                        Appointment: Bree Oleary 

WPtel:[phone]

                                        [facility]

                                              Well Woman      2020

 

             Patient Education: Patient Medication Summary                      

     Completed    2020

 

                                        Appointment: Yesica Crump 

WPtel:[phone] [address] [facility]

                                              (15 min) Moderate 2020

 

             Appointment:                            Injection    2020

 

             Patient Education: Patient Medication Summary                      

     Completed    2020

 

                          Visit Plan:               Bronchitis - acute case of b

ronchitis identified. Pt has been given

antibiotics, breathing treatments as appropriate, and pt has been instructed to 
call if symptoms are not improved, or if symptoms acutely worsen.

                                                            2019

 

                                        Appointment: Bree Oleary 

WPtel:[phone]

                                        [address] [place], [place] [id number]

                                              (15 min) Moderate 2019

 

             Patient Education: Patient Medication Summary                      

     Completed    2019

 

                          Visit Plan:               URI -viral -flu swab to r/o 

flu - Pt advised to increase fluids, 

vitamin C. Discussed natural and expected course of this diagnosis and need to 
alert me if symptoms do not follow expected course, or if any worse.

                                                            2019

 

                                        Appointment: Bree Oleary 

WPtel:[phone]

                                        [address] [place], [place] [id number]

                                              (30 min) Complex 2019

 

             Patient Education: Patient Medication Summary                      

     Completed    2019

 

                          Visit Plan:               Post-surgical menopause - co

ntinue to taper depo dose and 

eventually off medication completely -injection today in the office.

                                                            2019

 

                                        Appointment: Bree Oleary 

WPtel:[phone]

                                        [address] [place], [place] [id number]

                                              (15 min) Moderate 2019

 

             Patient Education: Patient Medication Summary                      

     Completed    2019

 

                                        Appointment: Bree Oleary 

WPtel:[phone]

                                        [address] [place], [place] [id number]

                                              (30 min) Complex 2019

 

                          Visit Plan:               Left knee, ankle, foot pain 

- will send RX - will refer to ortho - 

The pt is to use prn antiinflammatories to manage acute pain. The patient is to 
call the office if the pain is worsening or does not improve.

                                                            2019

 

                                        Appointment: Flakita Castro 

WPtel:[phone]

                                        [facility]

                                              (30 min) Complex 2019

 

             Patient Education: Patient Medication Summary                      

     Completed    2019

 

                    Care Plan: Referral Order                     SNOMED-CT : 30

[id number]

                          Pending                   2019

 

                          Visit Plan:               Left knee, ankle, foot pain 

- will send RX - if no improvement will

refer to ortho - The pt is to use prn antiinflammatories to manage acute pain. 
The patient is to call the office if the pain is worsening or does not improve.

                                                            2019

 

                                        Appointment: Flakita Castro 

WPtel:[phone]

                                        [address] [facility]

                                              (30 min) Complex 2019

 

             Patient Education: Patient Medication Summary                      

     Completed    2019

 

                          Visit Plan:               Well Adult - pt was counsele

d about diet, exercise, and encouraged 

to follow a heart healthy diet and increase activity level. The patient was 
instructed to RTC yearly for well adult exams and PRN for acute illnesses. The 
pt was also instructed to have yearly labs for check of cholesterol, thyroid, 
chem panel, CBC, and renal functioning. Post-surgical menopause - I have advised
the pt that we will look for her medications from [facility] to find out what her depo 
dose is so I can wean her off of it. Anxiety - the patient has uncontrolled 
anxiety and will benefit from an SSRI on a daily basis to attempt control of the
symptoms of anxiety (tachycardia, overwhelming sensations, stress, insomnia, 
etc). stop clonidine due to hypotension

                                                            08/15/2019

 

             Patient Education: Patient Medication Summary                      

     Completed    08/15/2019

 

                                        Referral: Neo Wayne 

HPtel:[phone] [facility]

              Referral                        Appointment Requested  

 

             Referral: Álvaro Sellers  Referral                  Appointment Co

nfirmed  

 

             Referral: Álvaro Sellers  Referral                  Completed     







Instructions





                          Comment                   Date

 

                                        CHEST XRAY TODAY

ALBUTEROL INHALER 2 PUFFS EVERY 4-6 HOURS AS NEEDED

WILL SEND REFERRAL TO DR WAYNE FOR EGD

WILL DISCUSS NEXT FOLLOW UP AFTER CHEST XRAY AND EFFECTIVENESS OF INHALER. CALL 
WITH UPDATE IN 1 WEEK.

                                        . Chronic cough/laryngitis - has been pe

rsistent for the last 6 weeks, despite 

treatment of allergies, GERD, and URI with steroids and antibiotic. Will obtain 
CXR today and rx for Albuterol inhaler sent to pharmacy to see if improvement 
with use. 



Dysphagia - pt reports feeling as though food is getting caught in her 
esophagus. Pt was being treated for possible silent GERD as has had a persistent
cough despite improvement in sinus congestion and drainage. Per pt request will 
refer for EGD with Dr. Wayne. Pt reports family history of esophageal 
stricture. 



Allergies - pt reports sinus congestion and drainage has significantly improved 
since taking Allegra daily.             2021

 

                                        GENERIC ALLEGRA OTC 1 TABLET TWICE A DAY

 FOR 5 DAYS, THEN DECREASE TO ONCE A DAY



OMEPRAZOLE (GENERIC PRILOSEC) 20MG TAKE 1 DAILY X 14



CLORICIDIN HP OVER THE COUNTER DECONGESTANT AS NEEDED WHEN YOU FEEL MORE 
CONGESTED



MIRALAX 17GM 1 CAPFUL MIX WITH WATER DAILY

DUE FOR ANNUAL MAMMOGRAM- order faxed



DUE TO ANNUAL FASTING LABS- orders given at visit



CONTINUE XYZOL OR ZYRTEC



STEROID SHOT TODAY



PREDNISONE TABLETS TAKE 2 TABLETS DAILY X 5 DAYS



PHENERGAN W/CODEINE COUGH MEDICINE

                                        . Allergies - recent increase in nasal c

ongestion that has been worsening cough.

Has tried Claritin in the past without any relief. Discussed use of being 
aggressive with allergies to help decrease cough and loss of voice. Discussed 
use of Allegra twice a day x 5 days then decrease down to daily. BP elevated the
last 2 visits, so discouraged use of Sudafed. Discussed us of Cloricidin HP as 
needed for congestions. 



Laryngitis - secondary to acute bronchitis that has worsened over the last 
month. Cough has improved, but pt reports tightness when lying down. Discussed 
possible silent GERD due to increase in coughing. Recommended taking Omeprazole 
daily x 2 weeks. 



Acute Bronchitis - cough improved, denies chest congestion. Completed 2 rounds 
of antibiotics and 2 rounds of steroids. Discussed treat more aggressive with 
allergies and GERD. Pt encouraged to follow up in 2 weeks if no improvement. 



Slow transit constipation - encouraged use Miralax 1 capful daily, may titrate 
based on needed. 

Allergies exacerbation with cough - chronic - recommended pt to use allergy 
medication as prescribed.  Pt has been counseled as  to the appropriate use of 
the medication.  Pt to call if allergy symptoms are not controlled with the 
medication.

If using nasal spray, instructions as follows: Nasal spray- use twice daily, one
spray per nostril twice daily, after 30 minutes, rinse out nose with saline 
spray.. Use opposite hand per nostril to spray in the nasal steroid allergy 
spray.  Kenalog injection given at visit. Prednisone rx sent. Phenergan with 
codeine cough medicine sent. 



Screening mammogram - orders sent.



Anxiety - controlled. Continue prn alprazolam at bedtime and escitalopram. 



Fasting lab orders sent







                                        2021

 

                                        DUE FOR ANNUAL MAMMOGRAM- order faxed



DUE TO ANNUAL FASTING LABS- orders given at visit



CONTINUE XYZOL OR ZYRTEC



STEROID SHOT TODAY



PREDNISONE TABLETS TAKE 2 TABLETS DAILY X 5 DAYS



PHENERGAN W/CODEINE COUGH MEDICINE

                                        . Allergies exacerbation with cough - ch

[place] - recommended pt to use allergy 

medication as prescribed.  Pt has been counseled as  to the appropriate use of 
the medication.  Pt to call if allergy symptoms are not controlled with the 
medication.

If using nasal spray, instructions as follows: Nasal spray- use twice daily, one
spray per nostril twice daily, after 30 minutes, rinse out nose with saline 
spray.. Use opposite hand per nostril to spray in the nasal steroid allergy 
spray.  Kenalog injection given at visit. Prednisone rx sent. Phenergan with 
codeine cough medicine sent. 



Screening mammogram - orders sent.



Anxiety - controlled. Continue prn alprazolam at bedtime and escitalopram. 



Fasting lab orders sent





                                        10/25/2021

 

                                        . Allergies - chronic - recommended pt t

o use allergy medication as prescribed. 

Pt has been counseled as  to the appropriate use of the medication.  Pt to call 
if allergy symptoms are not controlled with the medication.

If using nasal spray, instructions as follows: Nasal spray- use twice daily, one
spray per nostril twice daily, after 30 minutes, rinse out nose with saline 
spray.. Use opposite hand per nostril to spray in the nasal steroid allergy 
spray.



Headache -will treat allergies/congestion - instructed patient to call if does 
not improve and we will order CT Head without contrast. 

                                        2020

 

                                        . Sinusitis - Pt has acute infection - p

ain in face, maxillary region, Pt 

informed to use decongestant, RX given to patient, sinus rinses also 
recommended.  Call if symptoms do not show improvement.



Allergies - chronic - recommended pt to use allergy medication as prescribed.  
Pt has been counseled as  to the appropriate use of the medication.  Pt to call 
if allergy symptoms are not controlled with the medication.

If using nasal spray, instructions as follows: Nasal spray- use twice daily, one
spray per nostril twice daily, after 30 minutes, rinse out nose with saline 
spray.. Use opposite hand per nostril to spray in the nasal steroid allergy 
spray.



Migraine - will treat for sinus infection - pt is to notify clinic if symptoms 
do not improve, if they worsen, or with any changes, questions, or concerns.  

2020

 

                                        . Anxiety - the patient has uncontrolled

 anxiety and will benefit from a short 

term dose of xanax for control of symptoms. 2020

 

                                        . Well Adult Female - exam completed.  P

ap and  breast exam completed.  Pt will 

be called with results of her testing.  She was advised to continue with yearly 
annual exams.   Safe sex practices discussed during office visit today.  Call if
any abnormal gynecologic issues during the next year, otherwise, RTC yearly or 
prn.

                                        2020

 

                                        [place] 

                                        . Bronchitis - acute case of bronchitis 

identified.  Pt has been given 

antibiotics, breathing treatments as appropriate, and pt has been instructed to 
call if symptoms are not improved, or if symptoms acutely worsen.

                                        2019

 

                                        . URI -viral -flu swab to r/o flu - Pt a

dvised to increase fluids, vitamin C.  

Discussed natural and expected course of this diagnosis and need to alert me if 
symptoms do not follow expected course, or if any worse.

                                        2019

 

                                        . Post-surgical menopause - continue to 

taper depo dose and eventually off 

medication completely -injection today in the office. 

                                        2019

 

                                        tomorrow at 10:15 with the nurse elbert benavides. Left knee, ankle, foot pain - 

will send RX - will refer to ortho - The pt is to use prn antiinflammatories to 
manage acute pain. The patient is to call the office if the pain is worsening or
does not improve. 



                                        2019

 

                                        . Left knee, ankle, foot pain - will sen

d RX - if no improvement will refer to 

ortho - The pt is to use prn antiinflammatories to manage acute pain. The 
patient is to call the office if the pain is worsening or does not improve. 

                                        2019

 

                                        . Well Adult - pt was counseled about di

et, exercise, and encouraged to follow a

heart healthy diet and increase activity level.  The patient was instructed to 
RTC yearly for well adult exams and PRN for acute illnesses.  The pt was also 
instructed to have yearly labs for check of cholesterol, thyroid, chem panel, 
CBC, and renal functioning.



Post-surgical menopause - I have advised the pt that we will look for her 
medications from [facility] to find out what her depo dose is so I can wean her off of 
it.



Anxiety - the patient has uncontrolled anxiety and will benefit from an SSRI on 
a daily basis to attempt control of the symptoms of anxiety (tachycardia, 
overwhelming sensations, stress, insomnia, etc). 

stop clonidine due to hypotension       08/15/2019







Medical Equipment

No Medical Equipment data



Health Concerns Section

Health Concerns data not found



Goals Section

Goals data not found



Interventions Section

Interventions data not found



Health Status Evaluations/Outcomes Section

Health Status Evaluations/Outcomes data not found



Advance Directives

No Advance Directive data

## 2021-12-28 NOTE — PROGRESS NOTE-PRE OPERATIVE
Pre-Operative Progress Note


H&P Reviewed


The H&P was reviewed, patient examined and no changes noted.


Date Seen by Provider:  Dec 28, 2021


Time Seen by Provider:  12:38


Date H&P Reviewed:  Dec 28, 2021


Time H&P Reviewed:  12:38


Pre-Operative Diagnosis:  dysphagia











KIM WAYNE DO              Dec 28, 2021 12:38

## 2021-12-28 NOTE — ANESTHESIA-GENERAL POST-OP
MAC


Patient Condition


Mental Status/LOC:  Same as Preop


Cardiovascular:  Satisfactory


Nausea/Vomiting:  Absent


Respiratory:  Satisfactory


Pain:  Controlled


Complications:  Absent





Post Op Complications


Complications


None





Follow Up Care/Instructions


Patient Instructions


None needed.





Anesthesiology Discharge Order


Discharge Order


Patient is doing well, no complaints, stable vital signs, no apparent adverse 

anesthesia problems.   


No complications reported per nursing.











SHARLA DILLON CRNA              Dec 28, 2021 13:36

## 2021-12-28 NOTE — PROGRESS NOTE-POST OPERATIVE
Post-Operative Progess Note


Surgeon (s)/Assistant (s)


Surgeon


KIM WAYNE DO


Assistant:  na





Pre-Operative Diagnosis


dysphagia





Post-Operative Diagnosis





slight reflux esophagitis





Procedure & Operative Findings


Date of Procedure


12/28/21


Procedure Performed/Findings


egd c biopsies


Anesthesia Type


per crna





Estimated Blood Loss


Estimated blood loss (mL):  none





Specimens/Packing


Specimens Removed


antrum, ge











KIM WAYNE DO              Dec 28, 2021 13:32

## 2021-12-28 NOTE — XMS REPORT
CCD document using C-CDA

                             Created on: 2021



AyalaRosibel vegas

External Reference #: [id number]

: 1970

Sex: Female



Demographics





                          Address                   [address]

[place] [id number]

 

                          Home Phone                [phone]

 

                          Preferred Language        Unknown

 

                          Marital Status            Unknown

 

                          Shinto Affiliation     Unknown

 

                          Race                      White

 

                          Ethnic Group              Not  or 





Author





                          Author                    Rosibel Crump

 

                          Organization              Yesica Crump MD, KAREN

 

                          Address                   [address] [place]  [address]



 

                          Phone                     [phone]







Care Team Providers





                    Care Team Member Name Role                Phone

 

                    Yesica Crump     PP                  Unavailable

 

                          CCM                       Unavailable







Summary Purpose

Interface Exchange



Insurance Providers





             Payer name   Policy type / Coverage type Covered party ID Effective

 Begin Date 

Effective End Date

 

             [facility] [employer] [id number] 

Unknown      

Unknown







Family history





Mother



                          Diagnosis                 Age At Onset

 

                          Diabetes mellitus Type 2  Unknown

 

                          Alcoholism                Unknown

 

                          Depression                Unknown







Father



                          Diagnosis                 Age At Onset

 

                          Diabetes mellitus Type 2  Unknown







Social History





                Social History Element Codes           Description     Effective

 Dates

 

                    Marital status      Unknown             

Ha                                   08/15/2019

 

                Number of children Unknown         2               08/15/2019

 

                    Employment          Unknown             Currently employed

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

 

                Tobacco history SNOMED CT: 87549371 Current some days smoker 08/

15/2019

 

                Alcohol history SNOMED CT: 404023340 Never drinks alcohol 08/15/

2019







Allergies, Adverse Reactions, Alerts





           Substance  Reaction   Codes      Entered Date Inactivated Date Status

 

           Penicillin            Unknown    08/15/2019 No Inactive Date Active







Problems





                Condition       Codes           Effective Dates Condition Status

 

                          Acute bronchitis          ICD-10: J20.9

ICD-9: 466.0              2019                Active

 

                          Laryngitis                ICD-10: J04.0

ICD-9: 464.00             2021                Active

 

                          Other allergic rhinitis   ICD-10: J30.89

ICD-9: 477.8              2020                Active

 

                          Slow transit constipation ICD-10: K59.01

ICD-9: 564.01             2021                Active

 

                          Cough                     ICD-10: R05.9

ICD-9: 786.2              10/25/2021                Active

 

                          Encounter for screening mammogram for malignant neopla

sm of breast ICD-10: 

Z12.31

ICD-9: V76.12             11/10/2020                Active

 

                          Generalized anxiety disorder ICD-10: F41.9

ICD-9: 300.00             10/25/2021                Active

 

                          Menopausal and female climacteric states ICD-10: N95.1

ICD-9: 627.2              08/15/2019                Active

 

                          Encounter for general adult medical examination withou

t abnormal findings ICD-

10: Z00.00

ICD-9: V70.0              08/15/2019                Active

 

                          Screening, lipid          ICD-10: Z13.220

ICD-9: V77.91             10/22/2020                Active

 

                          Migraine without status migrainosus, not intractable, 

unspecified migraine type 

ICD-10: G43.909

ICD-9: 346.90             2020                Active

 

                          Headache                  ICD-10: R51

ICD-9: 784.0              2020                Active

 

                          Other acute sinusitis     ICD-10: J01.80

ICD-9: 461.8              2020                Active

 

                          Generalized anxiety disorder ICD-10: F41.1

ICD-9: 300.00             08/15/2019                Active

 

                                        Encounter for gynecological examination 

(general) (routine) without abnormal 

findings                                ICD-10: Z01.419

ICD-9: V72.31             2020                Active

 

                          Cough                     ICD-10: R05

ICD-9: 786.2              2019                Active

 

                          Body aches                ICD-10: R52

ICD-9: 780.96             2019                Active

 

                          Fever                     ICD-10: R50.9

ICD-9: 780.60             2019                Active

 

                          Pain in left ankle and joints of left foot ICD-10: M25

.572

ICD-9: 719.47             2019                Active

 

                          Pain in left knee         ICD-10: M25.562

ICD-9: 719.46             2019                Active







Medications





          Medication Codes     Instructions Start Date Stop Date Status    Fill 

Instructions

 

                    atorvastatin 20 mg tablet RxNorm: 287380      Take 1 Tablet(

s) Oral every night at 

bedtime         2021      Active           

 

                Miralax 17 gram/dose oral powder RxNorm: 922953  Take 1 scoop Or

al every day 

2021          12/15/2021          Active               

 

                    Allegra Allergy 180 mg tablet RxNorm: 023810      Take 1 Tab

let(s) Oral every day for

first 5 days take twice daily 2021      No Stop Date    Active           

 

                    omeprazole 20 mg tablet,delayed release RxNorm: 452012      

Take 1 Tablet(s) Oral 

every day       2021      Active           

 

                prednisone 20 mg tablet RxNorm: 507381  Take 2 Tablet(s) Oral ev

estevan day 

2021          Inactive             

 

                    azithromycin 250 mg tablet RxNorm: 594681      Take 1 Tablet

(s) Oral every day take 2

tablets day 1, then take 1 tablet daily on days 2-5 2021

21          

Inactive                                Please disregard Cefdinir RX.

 

                cefdinir 300 mg capsule RxNorm: 060380  Take 1 Capsule(s) Oral t

wo times a day 

2021          Inactive             

 

                    azithromycin 250 mg tablet RxNorm: 730557      Take 1 Tablet

(s) Oral every day take 2

tablets day 1, then take 1 tablet daily on days 2-5 2021

21          

Inactive                                Please disregard Cefdinir RX.

 

                    promethazine 6.25 mg-codeine 10 mg/5 mL syrup RxNorm: 490679

      Take 5 

Milliliter(s) Oral every 4-6 hours as needed cough 10/25/2021          10/25/202

1          

Inactive                                 

 

                prednisone 20 mg tablet RxNorm: 675417  Take 2 Tablet(s) Oral ev

estevan day 

10/25/2021          10/29/2021          Inactive             

 

                    promethazine 6.25 mg-codeine 10 mg/5 mL syrup RxNorm: 094956

      Take 5 

Milliliter(s) Oral every 4-6 hours as needed cough 10/25/2021          11/03/202

1          

Inactive                                 

 

                    Kenalog 40 mg/mL suspension for injection RxNorm: 4991363   

  Take 1 Milliliter(s) 

Injection       10/25/2021      10/25/2021      Inactive         

 

                    alprazolam 0.5 mg tablet RxNorm: 446512      Take 1 Tablet(s

) Oral two times a day as

needed FOR ANXIETY 10/15/2021      2021      Inactive         

 

                    alprazolam 0.5 mg tablet RxNorm: 749551      1 Tablet(s) Ora

l two times a day as 

needed anxiety  2021      Inactive         

 

                    Depo-Estradiol 5 mg/mL intramuscular oil RxNorm: 518504     

 0.75 Milliliter(s) 

Intramuscular   2021      Inactive         

 

                    alprazolam 0.5 mg tablet RxNorm: 337843      1 Tablet(s) Ora

l two times a day as 

needed anxiety  2021      Inactive         

 

                    Depo-Estradiol 5 mg/mL intramuscular oil RxNorm: 862782     

 0.75 Milliliter(s) 

Intramuscular   2021      Inactive         

 

                    alprazolam 0.5 mg tablet RxNorm: 265770      1 Tablet(s) Ora

l two times a day as 

needed anxiety  2021      Inactive         

 

                    alprazolam 0.5 mg tablet RxNorm: 374630      1 Tablet(s) Ora

l two times a day as 

needed anxiety  04/15/2021      2021      Inactive         

 

                    Depo-Provera 150 mg/mL intramuscular suspension RxNorm: 1000

128     Milliliter(s) 

Intramuscular   2021      Inactive         

 

                    escitalopram 10 mg tablet RxNorm: 098686      TAKE ONE TABLE

T BY MOUTH EVERY NIGHT AT

BEDTIME Tablet(s) Oral 2021      Inactive         

 

                    alprazolam 0.5 mg tablet RxNorm: 270025      1 Tablet(s) Ora

l two times a day as 

needed anxiety  2021      Inactive         

 

                    escitalopram 5 mg tablet RxNorm: 097405      TAKE ONE TABLET

 BY MOUTH EVERY NIGHT AT 

BEDTIME         02/15/2021      2021      Inactive         

 

                    alprazolam 0.5 mg tablet RxNorm: 718591      1 Tablet(s) Ora

l two times a day as 

needed anxiety  02/15/2021      2021      Inactive         

 

                atorvastatin 20 mg tablet RxNorm: 377079  TAKE ONE TABLET BY SHELLY

TH DAILY 

2021          Inactive             

 

                    Depo-Estradiol 5 mg/mL intramuscular oil RxNorm: 855065     

 3/4 Milliliter(s) 

Intramuscular monthly 2021      Inactive         

 

                    Depo-Provera 150 mg/mL intramuscular suspension RxNorm: 1000

128     Milliliter(s) 

Intramuscular   2021      Inactive         

 

                    alprazolam 0.5 mg tablet RxNorm: 839351      1 Tablet(s) Ora

l two times a day as 

needed anxiety  2020      Inactive         

 

                    acyclovir 400 mg tablet RxNorm: 615922      TAKE ONE TABLET 

BY MOUTH FOUR TIMES A DAY

                2020      Inactive         

 

                    alprazolam 0.5 mg tablet RxNorm: 304393      1 Tablet(s) Ora

l two times a day as 

needed anxiety  2020      12/15/2020      Inactive         

 

             atorvastatin 20 mg tablet RxNorm: 981861 1 Tablet(s) Oral every day

 2020                Inactive                   

 

             atorvastatin 20 mg tablet RxNorm: 456354 1 Tablet(s) Oral every day

 2020   

02/10/2021                Inactive                   

 

                    alprazolam 0.5 mg tablet RxNorm: 069813      1 Tablet(s) Ora

l two times a day as 

needed anxiety  10/23/2020      11/15/2020      Inactive         

 

                    Depo-Provera 150 mg/mL intramuscular suspension RxNorm: 1000

128     Milliliter(s) 

Intramuscular   10/20/2020      10/20/2020      Inactive         

 

                    acyclovir 400 mg tablet RxNorm: 486445      TAKE ONE TABLET 

BY MOUTH FOUR TIMES A DAY

                10/06/2020      11/15/2020      Inactive         

 

                    alprazolam 0.5 mg tablet RxNorm: 843833      1 Tablet(s) Ora

l two times a day as 

needed anxiety  2020      10/21/2020      Inactive         

 

                    alprazolam 0.5 mg tablet RxNorm: 821462      1 Tablet(s) Ora

l two times a day as 

needed anxiety  2020      Inactive         

 

                escitalopram 5 mg tablet RxNorm: 422986  1 Tablet(s) Oral every 

night at bedtime 

2020          Inactive             

 

             prednisone 20 mg tablet RxNorm: 101908 2 Tablet(s) Oral every day 0

2020                Inactive                   

 

                    Depo-Provera 150 mg/mL intramuscular suspension RxNorm: 1000

128     0.75 

Milliliter(s) Intramuscular 2020      Inactive         

 

                    Kenalog 40 mg/mL suspension for injection RxNorm: 1220751   

  1 Milliliter(s) 

Injection       2020      Inactive         

 

                Zithromax Z-Timur 250 mg tablet RxNorm: 860561  Tablet(s) Oral as 

directed 

2020          Inactive             

 

                    alprazolam 0.5 mg tablet RxNorm: 557871      1 Tablet(s) Ora

l two times a day as 

needed anxiety  2020      Inactive         

 

                    alprazolam 0.5 mg tablet RxNorm: 043779      1 Tablet(s) Ora

l two times a day as 

needed anxiety  2020      Inactive         

 

                    Depo-Provera 150 mg/mL intramuscular suspension RxNorm: 1000

128     Milliliter(s) 

Intramuscular   2020      Inactive         

 

                    acyclovir 400 mg tablet RxNorm: 265452      1 Tablet(s) Oral

 four times a day fever 

blisters        2020      Inactive         

 

                    Depo-Provera 150 mg/mL intramuscular suspension RxNorm: 1000

128     Milliliter(s) 

Intramuscular   2020      Inactive         

 

                pravastatin 10 mg tablet RxNorm: 914812  1 Tablet(s) Oral every 

night at bedtime 

2020          Inactive             

 

                pravastatin 10 mg tablet RxNorm: 925708  1 Tablet(s) Oral every 

night at bedtime 

2020          Inactive             

 

                    Depo-Estradiol 5 mg/mL intramuscular oil RxNorm: 902651     

 3/4 Milliliter(s) 

Intramuscular monthly 2020      Inactive         

 

                    Kenalog 40 mg/mL suspension for injection RxNorm: 9880714   

  Milliliter(s) 

Injection       2019      Inactive         

 

                    Phenergan with Codeine Syrup RxNorm:             5-10 Millil

iter(s) Oral Every 6 hrs as 

needed          2019      Inactive         

 

             prednisone 20 mg tablet RxNorm: 430833 2 Tablet(s) Oral every day 1

2019                Inactive                  start tomorrow

 

                    doxycycline hyclate 100 mg tablet RxNorm: 1684068     1 Tabl

et(s) Oral two times a 

day             2019      Inactive        start if symptom

s do not improve

 

                    Depo-Estradiol 5 mg/mL intramuscular oil RxNorm: 260397     

 3/4 Milliliter(s) 

Intramuscular monthly 2019      Inactive         

 

                    Depo-Estradiol 5 mg/mL intramuscular oil RxNorm: 150344     

 3/4 Milliliter(s) 

Intramuscular monthly 10/31/2019      2019      Inactive         

 

                    gabapentin 100 mg capsule RxNorm: 208118      1 Capsule(s) O

ral every night at 

bedtime         10/16/2019      2019      Inactive         

 

             gabapentin 100 mg capsule RxNorm: 504522 1 Capsule(s) PO QHS 09/12/

2019   

10/11/2019                Inactive                   

 

           naproxen 500 mg tablet RxNorm: 327967 1 Tablet(s) PO BID 2019 0

2019 

Inactive                                 

 

             escitalopram 5 mg tablet RxNorm: 897008 1 Tablet(s) PO QHS 08/15/20

19   2019

                          Inactive                   

 

          CoQ-10 oral RxNorm: 64686 oral      2021           Active     

 

             ranitidine 150 mg capsule RxNorm: 594586 1 Capsule(s) PO BID 2019                Inactive                   

 

             eszopiclone 1 mg tablet RxNorm: 310451 Tablet(s) PO as needed 2020                Inactive                   

 

                acyclovir 400 mg tablet RxNorm: 702647  1 Tablet(s) PO PRN fever

 blisters 

2020          Inactive             

 

             clonidine HCl 0.1 mg tablet RxNorm: 363983 Tablet(s) PO as needed 0

8/15/2019   

2019                Inactive                   







Medication Administered





             Medication   Codes        Instructions Start Date   Status

 

                Kenalog 40 mg/mL suspension for injection RxNorm: 3604598 1Milli

liter     10/25/2021

                                        No longer Active

 

                Depo-Estradiol 5 mg/mL intramuscular oil RxNorm: 421118  0.75Mil

liliter  

2021                              No longer Active

 

                Depo-Estradiol 5 mg/mL intramuscular oil RxNorm: 269831  0.75Mil

liliter  

2021                              No longer Active

 

                Depo-Provera 150 mg/mL intramuscular suspension RxNorm: 8841053 

Milliliter      

2021                              No longer Active

 

                Depo-Provera 150 mg/mL intramuscular suspension RxNorm: 6491579 

Milliliter      

2021                              No longer Active

 

                Depo-Provera 150 mg/mL intramuscular suspension RxNorm: 4752340 

Milliliter      

10/20/2020                              No longer Active

 

                Depo-Provera 150 mg/mL intramuscular suspension RxNorm: 8495650 

0.75Milliliter  

2020                              No longer Active

 

                Kenalog 40 mg/mL suspension for injection RxNorm: 7207552 1Milli

liter     2020

                                        No longer Active

 

                Depo-Provera 150 mg/mL intramuscular suspension RxNorm: 3052858 

Milliliter      

2020                              No longer Active

 

                Depo-Provera 150 mg/mL intramuscular suspension RxNorm: 5734758 

Milliliter      

2020                              No longer Active

 

                Depo-Estradiol 5 mg/mL intramuscular oil RxNorm: 935330  3/4Mill

ilitermonthly 

2020                              Active

 

             Kenalog 40 mg/mL suspension for injection RxNorm: 5771428 Millilite

r   2019   

No longer Active

 

                Depo-Estradiol 5 mg/mL intramuscular oil RxNorm: 632166  3/4Mill

ilitermonthly 

2019                              Active







Immunizations

No Immunization data



Results





          Observation Observation Code Item      Item Code Result    Date      S

[facility] Location

 

          Cbc With Differential Ord2      WBC                 11.77 K/ul 

021 Unknown

 

          Cbc With Differential Ord2      RBC                 4.50 M/ul 20

21 Unknown

 

          Cbc With Differential Ord2      HGB                 13.6 g/dl 20

21 Unknown

 

          Cbc With Differential Ord2      Neut%               69.1 %    20

21 Unknown

 

          Cbc With Differential Ord2      HCT                 43.0 %    20

21 Unknown

 

          Cbc With Differential Ord2      MCV                 95.6 fl   20

21 Unknown

 

          Cbc With Differential Ord2      Lymph%              22.0 %    20

21 Unknown

 

          Cbc With Differential Ord2      MCH                 30.2 pg   20

21 Unknown

 

          Cbc With Differential Ord2      Mono%               6.0 %     20

21 Unknown

 

          Cbc With Differential Ord2      Eos%                2.6 %     20

21 Unknown

 

          Cbc With Differential Ord2      MCHC                31.6 pg   20

21 Unknown

 

          Cbc With Differential Ord2      PLT                 288 K/ul  20

21 Unknown

 

          Cbc With Differential Ord2      Baso%               0.3 %     20

21 Unknown

 

          Cbc With Differential Ord2      RDW                 13.5 %    20

21 Unknown

 

          Cbc With Differential Ord2      Neut ABS#           8.13 K/ul 11/16/20

21 Unknown

 

          Cbc With Differential Ord2      Lymph ABS#           2.59 K/ul 

021 Unknown

 

          Cbc With Differential Ord2      Mono ABS#           0.7 K/ul  20

21 Unknown

 

          Cbc With Differential Ord2      Eos ABS#            0.3 K/ul  20

21 Unknown

 

          Cbc With Differential Ord2      Baso ABS#           0.0 K/ul  20

21 Unknown

 

          Comp Metabolic Bkp741    NA                  142 mEq/L 2021 Unkn

own

 

          Comp Metabolic Aaf785    K                   4.4 mEq/L 2021 Unkn

own

 

          Comp Metabolic Gyn215    CL                  101 mEq/L 2021 Unkn

own

 

          Comp Metabolic Lbi748    CO2                 32.0 mEq/L 2021 Unk

nown

 

          Comp Metabolic Sjk396    ANION GAP           13        2021 Unkn

own

 

          Comp Metabolic Krx017    GLUCOSE             89 mg/dL  2021 Unkn

own

 

          Comp Metabolic Mzy689    Creat               0.7 mg/dL 2021 Unkn

own

 

          Comp Metabolic Auf120    eGFR                90 ml/min/1.73m2 20

21 Unknown

 

          Comp Metabolic Erh972    BUN                 19 mg/dL  2021 Unkn

own

 

          Comp Metabolic Aqv042    B/C Ratio           26.4 Ratio 2021 Unk

nown

 

          Comp Metabolic Hoy898    CALCIUM             9.2 mg/dL 2021 Unkn

own

 

          Comp Metabolic Fgx814    ALK PHOS            85 U/L    2021 Unkn

own

 

          Comp Metabolic Tpp386    AST(SGOT)           16 U/L    2021 Unkn

own

 

          Comp Metabolic Svq994    ALT(SGPT)           19 U/L    2021 Unkn

own

 

          Comp Metabolic Cwp486    BILI T              0.6 mg/dL 2021 Unkn

own

 

          Comp Metabolic Vsb655    ALBUMIN             4.2 g/dL  2021 Unkn

own

 

          Comp Metabolic Cqo939    TPRO                7.1 g/dL  2021 Unkn

own

 

          Comp Metabolic Itn974    GLOB                2.9 g/dL  2021 Unkn

own

 

          Comp Metabolic Vst654    A/G Ratio           1.5 Ratio 2021 Unkn

own

 

          Comp Metabolic Dxk112    Osmo                285 mOsmo 2021 Unkn

own

 

          Tsh       Ord6      TSH (3rd IS)           1.14 uIU/mL 2021 Unkn

own

 

          Lipid     Ord30     CHOL                233 mg/dL 2021 Unknown

 

          Lipid     Ord30     HDL                 54.0 mg/dl 2021 Unknown

 

          Lipid     Ord30     TRIG                101 mg/dL 2021 Unknown

 

          Lipid     Ord30     LDL                 159 mg/dL 2021 Unknown

 

          Lipid     Ord30     C/HDL               4.3 Ratio 2021 Unknown

 

          Lipid     Ord30     CHOL                249 mg/dL 10/22/2020 Unknown

 

          Lipid     Ord30     HDL                 55.0 mg/dl 10/22/2020 Unknown

 

          Lipid     Ord30     TRIG                82 mg/dL  10/22/2020 Unknown

 

          Lipid     Ord30     LDL                 178 mg/dL 10/22/2020 Unknown

 

          Lipid     Ord30     C/HDL               4.5 Ratio 10/22/2020 Unknown

 

          Comp Metabolic Ljc778    NA                  138 mEq/L 10/22/2020 Unkn

own

 

          Comp Metabolic Ftc114    K                   4.5 mEq/L 10/22/2020 Unkn

own

 

          Comp Metabolic Dfz420    CL                  103 mEq/L 10/22/2020 Unkn

own

 

          Comp Metabolic Qmq838    CO2                 28.0 mEq/L 10/22/2020 Unk

nown

 

          Comp Metabolic Tut216    ANION GAP           12        10/22/2020 Unkn

own

 

          Comp Metabolic Jya632    GLUCOSE             90 mg/dL  10/22/2020 Unkn

own

 

          Comp Metabolic Xyq613    Creat               0.7 mg/dL 10/22/2020 Unkn

own

 

          Comp Metabolic Qky861    eGFR                88 ml/min/1.73m2 10/22/20

20 Unknown

 

          Comp Metabolic Fio042    BUN                 20 mg/dL  10/22/2020 Unkn

own

 

          Comp Metabolic Feu213    B/C Ratio           27.0 Ratio 10/22/2020 Unk

nown

 

          Comp Metabolic Jem926    CALCIUM             9.5 mg/dL 10/22/2020 Unkn

own

 

          Comp Metabolic Bwt553    ALK PHOS            87 U/L    10/22/2020 Unkn

own

 

          Comp Metabolic Tjy556    AST(SGOT)           17 U/L    10/22/2020 Unkn

own

 

          Comp Metabolic Fzg401    ALT(SGPT)           20 U/L    10/22/2020 Unkn

own

 

          Comp Metabolic Ldo927    BILI T              0.6 mg/dL 10/22/2020 [facility]

own

 

          Comp Metabolic Ihj891    ALBUMIN             4.5 g/dL  10/22/2020 [facility]

own

 

          Comp Metabolic Uld695    TPRO                7.6 g/dL  10/22/2020 [facility]

own

 

          Comp Metabolic Pkp101    GLOB                3.1 g/dL  10/22/2020 [facility]

own

 

          Comp Metabolic Bml474    A/G Ratio           1.5 Ratio 10/22/2020 [facility]

own

 

          Comp Metabolic Mna070    Osmo                278 mOsmo 10/22/2020 [facility]

own

 

          Cbc With Differential Ord2      WBC                 10.56 K/ul 10/22/2

020 Unknown

 

          Cbc With Differential Ord2      RBC                 4.44 M/ul 10/22/20

20 Unknown

 

          Cbc With Differential Ord2      HGB                 14.0 g/dl 10/22/20

20 Unknown

 

          Cbc With Differential Ord2      HCT                 42.8 %    10/22/20

20 Unknown

 

          Cbc With Differential Ord2      Neut%               73.9 %    10/22/20

20 Unknown

 

          Cbc With Differential Ord2      MCV                 96.4 fl   10/22/20

20 Unknown

 

          Cbc With Differential Ord2      Lymph%              18.2 %    10/22/20

20 Unknown

 

          Cbc With Differential Ord2      MCH                 31.5 pg   10/22/20

20 Unknown

 

          Cbc With Differential Ord2      Mono%               5.4 %     10/22/20

20 Unknown

 

          Cbc With Differential Ord2      Eos%                2.2 %     10/22/20

20 Unknown

 

          Cbc With Differential Ord2      MCHC                32.7 pg   10/22/20

20 Unknown

 

          Cbc With Differential Ord2      PLT                 258 K/ul  10/22/20

20 Unknown

 

          Cbc With Differential Ord2      Baso%               0.3 %     10/22/20

20 Unknown

 

          Cbc With Differential Ord2      Neut ABS#           7.81 K/ul 10/22/20

20 Unknown

 

          Cbc With Differential Ord2      RDW                 13.4 %    10/22/20

20 Unknown

 

          Cbc With Differential Ord2      Lymph ABS#           1.92 K/ul 10/22/2

020 Unknown

 

          Cbc With Differential Ord2      Mono ABS#           0.6 K/ul  10/22/20

20 Unknown

 

          Cbc With Differential Ord2      Eos ABS#            0.2 K/ul  10/22/20

20 Unknown

 

          Cbc With Differential Ord2      Baso ABS#           0.0 K/ul  10/22/20

20 Unknown

 

          Tsh       Ord6      TSH (3rd IS)           1.38 uIU/mL 2020 [facility]

own

 

          Comp Metabolic Ung556    NA                  141 mEq/L 2020 [facility]

own

 

          Comp Metabolic Sym471    K                   4.3 mEq/L 2020 [facility]

own

 

          Comp Metabolic Vvn739    CL                  104 mEq/L 2020 [facility]

own

 

          Comp Metabolic Jiy739    CO2                 29.0 mEq/L 2020 [facility]

nown

 

          Comp Metabolic Ksj975    ANION GAP           12        2020 [facility]

own

 

          Comp Metabolic Ffj169    GLUCOSE             83 mg/dL  2020 [facility]

own

 

          Comp Metabolic Eyz577    Creat               0.7 mg/dL 2020 [facility]

own

 

          Comp Metabolic Jhs346    eGFR                102 ml/min/1.73m2 

020 Unknown

 

          Comp Metabolic Cqj876    BUN                 16 mg/dL  2020 [facility]

own

 

          Comp Metabolic Otp005    B/C Ratio           24.6 Ratio 2020 [facility]

nown

 

          Comp Metabolic Xqc246    CALCIUM             9.3 mg/dL 2020 [facility]

own

 

          Comp Metabolic Hsi339    ALK PHOS            79 U/L    2020 [facility]

own

 

          Comp Metabolic Tjp533    AST(SGOT)           19 U/L    2020 [facility]

own

 

          Comp Metabolic Jmc834    ALT(SGPT)           26 U/L    2020 [facility]

own

 

          Comp Metabolic Wfv862    BILI T              0.5 mg/dL 2020 [facility]

own

 

          Comp Metabolic Dra543    ALBUMIN             4.2 g/dL  2020 [facility]

own

 

          Comp Metabolic Wrz280    TPRO                7.0 g/dL  2020 [facility]

own

 

          Comp Metabolic Tuv452    GLOB                2.8 g/dL  2020 [facility]

own

 

          Comp Metabolic Xog266    A/G Ratio           1.5 Ratio 2020 [facility]

own

 

          Comp Metabolic Yrv906    Osmo                282 mOsmo 2020 [facility]

own

 

          Cbc With Differential Ord2      WBC                 8.94 K/ul 20

20 Unknown

 

          Cbc With Differential Ord2      RBC                 4.34 M/ul 20

20 Unknown

 

          Cbc With Differential Ord2      HGB                 13.3 g/dl 20

20 Unknown

 

          Cbc With Differential Ord2      HCT                 40.7 %    20

20 Unknown

 

          Cbc With Differential Ord2      Neut%               71.8 %    20

20 Unknown

 

          Cbc With Differential Ord2      MCV                 93.8 fl   20

20 Unknown

 

          Cbc With Differential Ord2      Lymph%              19.5 %    20

20 Unknown

 

          Cbc With Differential Ord2      MCH                 30.6 pg   20

20 Unknown

 

          Cbc With Differential Ord2      Mono%               6.2 %     20

20 Unknown

 

          Cbc With Differential Ord2      Eos%                2.2 %     20

20 Unknown

 

          Cbc With Differential Ord2      MCHC                32.7 pg   20

20 Unknown

 

          Cbc With Differential Ord2      Baso%               0.3 %     20

20 Unknown

 

          Cbc With Differential Ord2      PLT                 262 K/ul  20

20 Unknown

 

          Cbc With Differential Ord2      RDW                 13.4 %    20

20 Unknown

 

          Cbc With Differential Ord2      Neut ABS#           6.42 K/ul 20

20 Unknown

 

          Cbc With Differential Ord2      Lymph ABS#           1.74 K/ul 

020 Unknown

 

          Cbc With Differential Ord2      Mono ABS#           0.6 K/ul  20

20 Unknown

 

          Cbc With Differential Ord2      Eos ABS#            0.2 K/ul  20

20 Unknown

 

          Cbc With Differential Ord2      Baso ABS#           0.0 K/ul  20

20 Unknown

 

          Lipid     Ord30     CHOL                240 mg/dL 2020 Unknown

 

          Lipid     Ord30     HDL                 57.0 mg/dl 2020 Unknown

 

          Lipid     Ord30     TRIG                80 mg/dL  2020 Unknown

 

          Lipid     Ord30     LDL                 167 mg/dL 2020 Unknown

 

          Lipid     Ord30     C/HDL               4.2 Ratio 2020 Unknown

 

          C A/B FLU [id number]   Influenza A Scr           Negative  2019 [facility]

[facility]own

 

          C A/B FLU [id number]   Influenza B Scr           Negative  2019 [facility]

nown

 

           C A/B FLU  [id number]    Influenza Intrp B AG:PRID:PT:NOSE:NOM:IF       

     See Footnote  

2019                              Unknown







Procedures





                    Procedure           Codes               Date

 

                    THER/PROPH/DIAG INJ SC/IM CPT-4: 02441        2021

 

                    THER/PROPH/DIAG INJ SC/IM CPT-4: 98852        2021

 

                    THER/PROPH/DIAG INJ SC/IM CPT-4: 71343        2021

 

                    THER/PROPH/DIAG INJ SC/IM CPT-4: 26822        2021

 

                    THER/PROPH/DIAG INJ SC/IM CPT-4: 25783        10/20/2020

 

                    THER/PROPH/DIAG INJ SC/IM CPT-4: 09415        2020

 

                    TRIAMCINOLONE ACET INJ NOS 10 mg CPT-4:         

020

 

                    THER/PROPH/DIAG INJ SC/IM CPT-4: 07405        2020

 

                    THER/PROPH/DIAG INJ SC/IM CPT-4: 40373        2020

 

                    THER/PROPH/DIAG INJ SC/IM CPT-4: 47066        2020

 

                    TRIAMCINOLONE ACET INJ NOS 10 mg CPT-4:         

019

 

                    THER/PROPH/DIAG INJ SC/IM CPT-4: 67342        2019







Vital Signs





                          Date                      Vital

 

                2021      Blood Pressure 1: 156/82 Code: 8480-6 Heart Rate

 1: 81 bpm Height: 

5'2" Code: 8302-2   Height: 5'2" Code: 8302-2 SpO2: 99%           Temperature: 3

6.2 (C) / 

97.1 (F)                                Weight:  Code: 24159-1

 

                10/25/2021      Blood Pressure 1: 142/68 Code: 8480-6 BMI: 30.7 

Code: 73129-2 Heart 

Rate 1: 63 bpm      Height: 5'2" Code: 8302-2 SpO2: 97%           Temperature: 3

6.4 (C) / 97.6 

(F)                                     Weight: 168 lbs  Code: 86100-1

 

                2020      Blood Pressure 1: 130/80 Code: 8480-6 BMI: 28.5 

Code: 83081-9 Heart 

Rate 1: 69 bpm      Height: 5'2" Code: 8302-2 SpO2: 97%           Temperature: 3

6.9 (C) / 98.4 

(F)                                     Weight: 156 lbs  Code: 58035-3

 

                2020      Blood Pressure 1: 128/78 Code: 8480-6 Heart Rate

 1: 74 bpm Height:  

Code: 8302-2              SpO2: 98%                 Weight:  Code: 13473-8

 

                    2020          Height:  Code: 8302-2 Weight:  Code: 294

63-7

 

                2020      Blood Pressure 1: 132/80 Code: 8480-6 BMI: 28.9 

Code: 65124-6 Heart 

Rate 1: 68 bpm      Height: 5'2" Code: 8302-2 SpO2: 97%           Weight: 158 lb

s  Code: 

87420-0

 

                2019      Blood Pressure 1: 110/60 Code: 8480-6 BMI: 28.5 

Code: 63929-4 Heart 

Rate 1: 76 bpm      Height: 5'2" Code: 8302-2 SpO2: 98%           Temperature: 3

6.9 (C) / 98.5 

(F)                                     Weight: 156 lbs  Code: 43277-0

 

             2019   Blood Pressure 1: 124/80 Code: 8480-6 Heart Rate 1: 81

 bpm SpO2: 98%    

Temperature: 36.7 (C) / 98.1 (F)

 

                2019      Blood Pressure 1: 130/78 Code: 8480-6 BMI: 28.0 

Code: 06019-8 Heart 

Rate 1: 68 bpm      Height: 5'2" Code: 8302-2 SpO2: 98%           Weight: 153 lb

s  Code: 

60064-7

 

                2019      Heart Rate 1: 68 bpm Height:  Code: 8302-2 Weigh

t:  Code: 42122-6

 

                2019      Blood Pressure 1: 132/74 Code: 8480-6 BMI: 27.4 

Code: 68268-3 Heart 

Rate 1: 66 bpm      Height: 5'2" Code: 8302-2 SpO2: 98%           Weight: 150 lb

s  Code: 

06270-0

 

                08/15/2019      Blood Pressure 1: 104/60 Code: 8480-6 BMI: 27.5 

Code: 89524-6 Heart 

Rate 1: 66 bpm      Height: 5'2" Code: 8302-2 SpO2: 98%           Weight: 150 lb

s 5 oz Code: 

85735-2







Functional Status

No Functional Status data



Reason For Visit





                    Reason For Visit    Effective Dates     Notes

 

                    cough               2021           

 

                    sinus congestion    10/25/2021           

 

                    headache            2020           

 

                    headache            2020           

 

                    well woman exam (40-65 years) 2020           

 

                    cough               2019           

 

                    fever               2019           

 

                    menopausal symptoms 2019           

 

                    lower leg pain      2019           

 

                    lower leg pain      2019           

 

                    anxiety             08/15/2019           







Encounters





             Encounter    Performer    Location     Codes        Date

 

                                        [phone] 67529 EST. PATIENT, LEVEL IV

Diagnosis: Acute bronchitis[ICD10: J20.9]

Diagnosis: Laryngitis[ICD10: J04.0]

Diagnosis: Other allergic rhinitis[ICD10: J30.89]

Diagnosis: Slow transit constipation[ICD10: K59.01] [facility]                   CPT-4: 10919              2021

 

                                        [phone]) 29243 EST. PATIENT, LEVEL IV

Diagnosis: Generalized anxiety disorder[ICD10: F41.9]

Diagnosis: Cough[ICD10: R05.9]

Diagnosis: Other allergic rhinitis[ICD10: J30.89]

Diagnosis: Encounter for screening mammogram for malignant neoplasm of 
breast[ICD10: Z12.31] Eulalia Crump MD, [facility] CPT-4: 48186    

10/25/2021

 

                                        [phone]) 59580 EST. PATIENT, LEVEL III

Diagnosis: Headache[ICD10: R51]

Diagnosis: Other allergic rhinitis[ICD10: J30.89] Bree Gutierres MD, [facility]          CPT-4: 70029              2020

 

                                        59058 EST. PATIENT, LEVEL III

Diagnosis: Other acute sinusitis[ICD10: J01.80]

Diagnosis: Other allergic rhinitis[ICD10: J30.89]

Diagnosis: Migraine without status migrainosus, not intractable, unspecified 
migraine type[ICD10: G43.909]

Diagnosis: Menopausal and female climacteric states[ICD10: N95.1] Flakita Crump MD, [facility]    CPT-4: 65339              2020

 

                                        [phone] 40821 EST. PATIENT, LEVEL III

Diagnosis: Generalized anxiety disorder[ICD10: F41.1] Yesica Crump MD, [facility]          CPT-4: 39333              2020

 

                                        ([id number]) PREV VISIT EST AGE 40-64

Diagnosis: Encounter for gynecological examination (general) (routine) without 
abnormal findings[ICD10: Z01.419] Bree Crump MD, [facility] CPT

-4:

07253                                   2020

 

                                        [phone] 53487 EST. PATIENT, LEVEL III

Diagnosis: Cough[ICD10: R05]

Diagnosis: Acute bronchitis[ICD10: J20.9] Bree carrasco MD, 

[facility]                       CPT-4: 39663              2019

 

                                        [phone]) 75110 EST. PATIENT, LEVEL III

Diagnosis: Fever[ICD10: R50.9]

Diagnosis: Body aches[ICD10: R52] Bree Crump MD, [facility] CPT

-4:

52617                                   2019

 

                                        [phone] 22265 EST. PATIENT, LEVEL III

Diagnosis: Menopausal and female climacteric states[ICD10: N95.1] Bree Crump MD, [facility] CPT-4: 32644        2019

 

                                        07296 EST. PATIENT, LEVEL III

Diagnosis: Pain in left ankle and joints of left foot[ICD10: M25.572]

Diagnosis: Pain in left knee[ICD10: M25.562] [facility]

                          CPT-4: 20672              2019

 

                                        04965 EST. PATIENT, LEVEL III

Diagnosis: Pain in left ankle and joints of left foot[ICD10: M25.572]

Diagnosis: Pain in left knee[ICD10: M25.562] [facility]

                          CPT-4: 36583              2019

 

                                        ([id number]) PREV VISIT NEW AGE 40-64

Diagnosis: Encounter for general adult medical examination without abnormal 
findings[ICD10: Z00.00] [facility] CPT-4: 74068    

08/15/2019







Plan of Care





             Planned Activity Notes        Codes        Status       Date

 

                          Visit Plan:               Allergies - recent increase 

in nasal congestion that has been 

worsening cough. Has tried Claritin in the past without any relief. Discussed 
use of being aggressive with allergies to help decrease cough and loss of voice.
Discussed use of Allegra twice a day x 5 days then decrease down to daily. BP 
elevated the last 2 visits, so discouraged use of Sudafed. Discussed us of 
Cloricidin HP as needed for congestions. Laryngitis - secondary to acute 
bronchitis that has worsened over the last month. Cough has improved, but pt 
reports tightness when lying down. Discussed possible silent GERD due to increas
e in coughing. Recommended taking Omeprazole daily x 2 weeks. Acute Bronchitis -
cough improved, denies chest congestion. Completed 2 rounds of antibiotics and 2
rounds of steroids. Discussed treat more aggressive with allergies and GERD. Pt 
encouraged to follow up in 2 weeks if no improvement. Slow transit constipation 
- encouraged use Miralax 1 capful daily, may titrate based on needed. Allergies 
exacerbation with cough - chronic - recommended pt to use allergy medication as 
prescribed. Pt has been counseled as to the appropriate use of the medication. 
Pt to call if allergy symptoms are not controlled with the medication. If using 
nasal spray, instructions as follows: Nasal spray- use twice daily, one spray 
per nostril twice daily, after 30 minutes, rinse out nose with saline spray.. 
Use opposite hand per nostril to spray in the nasal steroid allergy spray. 
Kenalog injection given at visit. Prednisone rx sent. Phenergan with codeine 
cough medicine sent. Screening mammogram - orders sent. Anxiety - controlled. 
Continue prn alprazolam at bedtime and escitalopram. Fasting lab orders sent

                                                            2021

 

                                        Appointment: Eulalia Osman 

WPtel:[phone]

                                        [address] [facility]

                                              (30 min) Complex 2021

 

             Patient Education: Patient Medication Summary                      

     Completed    2021

 

             Patient Education: Patient Medication Summary                      

     Completed    2021

 

                          Visit Plan:               Allergies exacerbation with 

cough - chronic - recommended pt to use

allergy medication as prescribed. Pt has been counseled as to the appropriate 
use of the medication. Pt to call if allergy symptoms are not controlled with 
the medication. If using nasal spray, instructions as follows: Nasal spray- use 
twice daily, one spray per nostril twice daily, after 30 minutes, rinse out nose
with saline spray.. Use opposite hand per nostril to spray in the nasal steroid 
allergy spray. Kenalog injection given at visit. Prednisone rx sent. Phenergan 
with codeine cough medicine sent. Screening mammogram - orders sent. Anxiety - 
controlled. Continue prn alprazolam at bedtime and escitalopram. Fasting lab 
orders sent

                                                            10/25/2021

 

                                        Appointment: Eulalia Osman 

WPtel:[phone]

                                        [address] [facility]

                                              (30 min) Complex 10/25/2021

 

             Patient Education: Patient Medication Summary                      

     Completed    10/25/2021

 

             Patient Education: prednisone- [employer] [id number]         

                  Completed    

10/25/2021

 

             Appointment:                            Injection    2021

 

             Patient Education: Patient Medication Summary                      

     Completed    2021

 

             Appointment:                            Injection    2021

 

             Patient Education: Patient Medication Summary                      

     Completed    2021

 

             Appointment:                            Injection    2021

 

             Appointment:                            Injection    2021

 

             Patient Education: Patient Medication Summary                      

     Completed    2021

 

             Appointment:                            Injection    2021

 

             Patient Education: Patient Medication Summary                      

     Completed    2021

 

             Patient Education: Patient Medication Summary                      

     Completed    11/10/2020

 

             Patient Education: Patient Medication Summary                      

     Completed    10/22/2020

 

             Appointment:                            Injection    10/20/2020

 

             Patient Education: Patient Medication Summary                      

     Completed    10/20/2020

 

                          Visit Plan:               Allergies - chronic - recomm

ended pt to use allergy medication as 

prescribed. Pt has been counseled as to the appropriate use of the medication. 
Pt to call if allergy symptoms are not controlled with the medication. If using 
nasal spray, instructions as follows: Nasal spray- use twice daily, one spray 
per nostril twice daily, after 30 minutes, rinse out nose with saline spray.. 
Use opposite hand per nostril to spray in the nasal steroid allergy spray. 
Headache -will treat allergies/congestion - instructed patient to call if does 
not improve and we will order CT Head without contrast.

                                                            2020

 

                                        Appointment: Bree Oleary 

WPtel:[phone]

                                        [facility] [id number] [place] [address]                                              (15 min) Moderate 2020

 

             Patient Education: Patient Medication Summary                      

     Completed    2020

 

                          Visit Plan:               Sinusitis - Pt has acute inf

ection - pain in face, maxillary 

region, Pt informed to use decongestant, RX given to patient, sinus rinses also 
recommended. Call if symptoms do not show improvement. Allergies - chronic - 
recommended pt to use allergy medication as prescribed. Pt has been counseled as
to the appropriate use of the medication. Pt to call if allergy symptoms are not
controlled with the medication. If using nasal spray, instructions as follows: 
Nasal spray- use twice daily, one spray per nostril twice daily, after 30 
minutes, rinse out nose with saline spray.. Use opposite hand per nostril to 
spray in the nasal steroid allergy spray. Migraine - will treat for sinus 
infection - pt is to notify clinic if symptoms do not improve, if they worsen, 
or with any changes, questions, or concerns.

                                                            2020

 

                                        Appointment: Flakita Castro 

WPtel:[phone]

                                        [facility]                                              (30 min) Complex 2020

 

             Patient Education: Patient Medication Summary                      

     Completed    2020

 

                          Visit Plan:               Anxiety - the patient has un

controlled anxiety and will benefit 

from a short term dose of xanax for control of symptoms.

                                                            2020

 

                                        Appointment: Yesica Crump 

WPtel:[phone]

                                        [facility]                                              TeleHealth      2020

 

             Patient Education: Patient Medication Summary                      

     Completed    2020

 

             Appointment:                            Injection    2020

 

             Patient Education: Patient Medication Summary                      

     Completed    2020

 

             Appointment:                            Injection    2020

 

             Appointment:                            Injection    2020

 

             Patient Education: Patient Medication Summary                      

     Completed    2020

 

                                        Appointment: Yesica Crump 

WPtel:[phone]

                                        [address] [place], [place] [id number]

                                              Well Woman      03/10/2020

 

                          Visit Plan:               Well Adult Female - exam com

pleted. Pap and breast exam completed. 

Pt will be called with results of her testing. She was advised to continue with 
yearly annual exams. Safe sex practices discussed during office visit today. 
Call if any abnormal gynecologic issues during the next year, otherwise, RTC 
yearly or prn.

                                                            2020

 

                                        Appointment: Bree Oleary 

WPtel:[phone]

                                        [address] [place], [place] [id number]

                                              Well Woman      2020

 

             Patient Education: Patient Medication Summary                      

     Completed    2020

 

                                        Appointment: Yesica Crump 

WPtel:[phone]

                                        [address] [place] [place], [place] [id number]                                              (15 min) Moderate 2020

 

             Appointment:                            Injection    2020

 

             Patient Education: Patient Medication Summary                      

     Completed    2020

 

                          Visit Plan:               Bronchitis - acute case of b

ronchitis identified. Pt has been given

antibiotics, breathing treatments as appropriate, and pt has been instructed to 
call if symptoms are not improved, or if symptoms acutely worsen.

                                                            2019

 

                                        Appointment: Bree Oleary 

WPtel:[phone]

                                        [address] [place], [place] [id number]

                                              (15 min) Moderate 2019

 

             Patient Education: Patient Medication Summary                      

     Completed    2019

 

                          Visit Plan:               URI -viral -flu swab to r/o 

flu - Pt advised to increase fluids, 

vitamin C. Discussed natural and expected course of this diagnosis and need to 
alert me if symptoms do not follow expected course, or if any worse.

                                                            2019

 

                                        Appointment: Bree Oleary 

WPtel:[phone]

                                        [facility]

                                              (30 min) Complex 2019

 

             Patient Education: Patient Medication Summary                      

     Completed    2019

 

                          Visit Plan:               Post-surgical menopause - co

ntinue to taper depo dose and 

eventually off medication completely -injection today in the office.

                                                            2019

 

                                        Appointment: Bree Oleary 

WPtel:[phone]

                                        [facility]

[place]                                              (15 min) Moderate 2019

 

             Patient Education: Patient Medication Summary                      

     Completed    2019

 

                                        Appointment: Bree Oleary 

WPtel:[phone]

                                        [facility]

[place]                                              (30 min) Complex 2019

 

                          Visit Plan:               Left knee, ankle, foot pain 

- will send RX - will refer to ortho - 

The pt is to use prn antiinflammatories to manage acute pain. The patient is to 
call the office if the pain is worsening or does not improve.

                                                            2019

 

                                        Appointment: Flakita Castro 

WPtel:[phone]

                                        [facility]

                                              (30 min) Complex 2019

 

             Patient Education: Patient Medication Summary                      

     Completed    2019

 

                    Care Plan: Referral Order                     SNOMED-CT : 30

[id number]

                          Pending                   2019

 

                          Visit Plan:               Left knee, ankle, foot pain 

- will send RX - if no improvement will

refer to ortho - The pt is to use prn antiinflammatories to manage acute pain. 
The patient is to call the office if the pain is worsening or does not improve.

                                                            2019

 

                                        Appointment: Flakita Castro 

WPtel:[phone]

                                        [facility]

                                              (30 min) Complex 2019

 

             Patient Education: Patient Medication Summary                      

     Completed    2019

 

                          Visit Plan:               Well Adult - pt was counsele

d about diet, exercise, and encouraged 

to follow a heart healthy diet and increase activity level. The patient was 
instructed to RTC yearly for well adult exams and PRN for acute illnesses. The 
pt was also instructed to have yearly labs for check of cholesterol, thyroid, 
chem panel, CBC, and renal functioning. Post-surgical menopause - I have advised
the pt that we will look for her medications from [facility] to find out what her depo 
dose is so I can wean her off of it. Anxiety - the patient has uncontrolled 
anxiety and will benefit from an SSRI on a daily basis to attempt control of the
symptoms of anxiety (tachycardia, overwhelming sensations, stress, insomnia, 
etc). stop clonidine due to hypotension

                                                            08/15/2019

 

             Patient Education: Patient Medication Summary                      

     Completed    08/15/2019

 

             Referral: Álvaro Sellers  Referral                  Appointment Co

nfirmed  

 

             Referral: Álvaro Sellers  Referral                  Completed     







Instructions





                                        Comment

 

                                        GENERIC ALLEGRA OTC 1 TABLET TWICE A DAY

 FOR 5 DAYS, THEN DECREASE TO ONCE A DAY



OMEPRAZOLE (GENERIC PRILOSEC) 20MG TAKE 1 DAILY X 14



CLORICIDIN HP OVER THE COUNTER DECONGESTANT AS NEEDED WHEN YOU FEEL MORE 
CONGESTED



MIRALAX 17GM 1 CAPFUL MIX WITH WATER DAILY

DUE FOR ANNUAL MAMMOGRAM- order faxed



DUE TO ANNUAL FASTING LABS- orders given at visit



CONTINUE XYZOL OR ZYRTEC



STEROID SHOT TODAY



PREDNISONE TABLETS TAKE 2 TABLETS DAILY X 5 DAYS



PHENERGAN W/CODEINE COUGH MEDICINE

                                        . Allergies - recent increase in nasal c

ongestion that has been worsening cough.

Has tried Claritin in the past without any relief. Discussed use of being 
aggressive with allergies to help decrease cough and loss of voice. Discussed 
use of Allegra twice a day x 5 days then decrease down to daily. BP elevated the
last 2 visits, so discouraged use of Sudafed. Discussed us of Cloricidin HP as 
needed for congestions. 



Laryngitis - secondary to acute bronchitis that has worsened over the last 
month. Cough has improved, but pt reports tightness when lying down. Discussed 
possible silent GERD due to increase in coughing. Recommended taking Omeprazole 
daily x 2 weeks. 



Acute Bronchitis - cough improved, denies chest congestion. Completed 2 rounds 
of antibiotics and 2 rounds of steroids. Discussed treat more aggressive with 
allergies and GERD. Pt encouraged to follow up in 2 weeks if no improvement. 



Slow transit constipation - encouraged use Miralax 1 capful daily, may titrate 
based on needed. 

Allergies exacerbation with cough - chronic - recommended pt to use allergy 
medication as prescribed.  Pt has been counseled as  to the appropriate use of 
the medication.  Pt to call if allergy symptoms are not controlled with the 
medication.

If using nasal spray, instructions as follows: Nasal spray- use twice daily, one
spray per nostril twice daily, after 30 minutes, rinse out nose with saline 
spray.. Use opposite hand per nostril to spray in the nasal steroid allergy 
spray.  Kenalog injection given at visit. Prednisone rx sent. Phenergan with 
codeine cough medicine sent. 



Screening mammogram - orders sent.



Anxiety - controlled. Continue prn alprazolam at bedtime and escitalopram. 



Fasting lab orders sent









 

                                        DUE FOR ANNUAL MAMMOGRAM- order faxed



DUE TO ANNUAL FASTING LABS- orders given at visit



CONTINUE XYZOL OR ZYRTEC



STEROID SHOT TODAY



PREDNISONE TABLETS TAKE 2 TABLETS DAILY X 5 DAYS



PHENERGAN W/CODEINE COUGH MEDICINE

                                        . Allergies exacerbation with cough - ch

ronic - recommended pt to use allergy 

medication as prescribed.  Pt has been counseled as  to the appropriate use of 
the medication.  Pt to call if allergy symptoms are not controlled with the 
medication.

If using nasal spray, instructions as follows: Nasal spray- use twice daily, one
spray per nostril twice daily, after 30 minutes, rinse out nose with saline 
spray.. Use opposite hand per nostril to spray in the nasal steroid allergy 
spray.  Kenalog injection given at visit. Prednisone rx sent. Phenergan with 
codeine cough medicine sent. 



Screening mammogram - orders sent.



Anxiety - controlled. Continue prn alprazolam at bedtime and escitalopram. 



Fasting lab orders sent







 

                                        . Allergies - chronic - recommended pt t

o use allergy medication as prescribed. 

Pt has been counseled as  to the appropriate use of the medication.  Pt to call 
if allergy symptoms are not controlled with the medication.

If using nasal spray, instructions as follows: Nasal spray- use twice daily, one
spray per nostril twice daily, after 30 minutes, rinse out nose with saline 
spray.. Use opposite hand per nostril to spray in the nasal steroid allergy 
spray.



Headache -will treat allergies/congestion - instructed patient to call if does 
not improve and we will order CT Head without contrast. 



 

                                        . Sinusitis - Pt has acute infection - p

ain in face, maxillary region, Pt 

informed to use decongestant, RX given to patient, sinus rinses also 
recommended.  Call if symptoms do not show improvement.



Allergies - chronic - recommended pt to use allergy medication as prescribed.  
Pt has been counseled as  to the appropriate use of the medication.  Pt to call 
if allergy symptoms are not controlled with the medication.

If using nasal spray, instructions as follows: Nasal spray- use twice daily, one
spray per nostril twice daily, after 30 minutes, rinse out nose with saline 
spray.. Use opposite hand per nostril to spray in the nasal steroid allergy 
spray.



Migraine - will treat for sinus infection - pt is to notify clinic if symptoms 
do not improve, if they worsen, or with any changes, questions, or concerns. 

 

                                        . Anxiety - the patient has uncontrolled

 anxiety and will benefit from a short 

term dose of xanax for control of symptoms.

 

                                        . Well Adult Female - exam completed.  P

ap and  breast exam completed.  Pt will 

be called with results of her testing.  She was advised to continue with yearly 
annual exams.   Safe sex practices discussed during office visit today.  Call if
any abnormal gynecologic issues during the next year, otherwise, RTC yearly or 
prn.



 

                                        kenalog 

                                        . Bronchitis - acute case of bronchitis 

identified.  Pt has been given 

antibiotics, breathing treatments as appropriate, and pt has been instructed to 
call if symptoms are not improved, or if symptoms acutely worsen.



 

                                        . URI -viral -flu swab to r/o flu - Pt a

dvised to increase fluids, vitamin C.  

Discussed natural and expected course of this diagnosis and need to alert me if 
symptoms do not follow expected course, or if any worse.



 

                                        . Post-surgical menopause - continue to 

taper depo dose and eventually off 

medication completely -injection today in the office. 



 

                                        tomorrow at 10:15 with the nurse praclouise benavides. Left knee, ankle, foot pain - 

will send RX - will refer to ortho - The pt is to use prn antiinflammatories to 
manage acute pain. The patient is to call the office if the pain is worsening or
does not improve. 





 

                                        . Left knee, ankle, foot pain - will sen

d RX - if no improvement will refer to 

ortho - The pt is to use prn antiinflammatories to manage acute pain. The 
patient is to call the office if the pain is worsening or does not improve. 



 

                                        . Well Adult - pt was counseled about di

et, exercise, and encouraged to follow a

heart healthy diet and increase activity level.  The patient was instructed to 
RTC yearly for well adult exams and PRN for acute illnesses.  The pt was also 
instructed to have yearly labs for check of cholesterol, thyroid, chem panel, 
CBC, and renal functioning.



Post-surgical menopause - I have advised the pt that we will look for her 
medications from [facility] to find out what her depo dose is so I can wean her off of 
it.



Anxiety - the patient has uncontrolled anxiety and will benefit from an SSRI on 
a daily basis to attempt control of the symptoms of anxiety (tachycardia, 
overwhelming sensations, stress, insomnia, etc). 

stop clonidine due to hypotension







Medical Equipment

No Medical Equipment data



Health Concerns Section

Health Concerns data not found



Goals Section

Goals data not found



Interventions Section

Interventions data not found



Health Status Evaluations/Outcomes Section

Health Status Evaluations/Outcomes data not found



Advance Directives

No Advance Directive data

## 2021-12-28 NOTE — DISCHARGE INST-SIMPLE/STANDARD
Discharge Inst-Standard


Discharge Medications


New, Converted or Re-Newed RX:  RX on Chart





Patient Instructions/Follow Up


Plan of Care/Instructions/FU:  


2 weeks Cory


Activity as Tolerated:  Yes


Discharge Diet:  Regular Diet











KIM WAYNE DO              Dec 28, 2021 13:30

## 2021-12-28 NOTE — XMS REPORT
CCD document using C-CDA

                             Created on: 2021



AyalaRosibel vegas

External Reference #: [id number]

: 1970

Sex: Female



Demographics





                          Address                   [address]

[place]  [address]

 

                          Home Phone                [phone]

 

                          Preferred Language        Unknown

 

                          Marital Status            Unknown

 

                          Moravian Affiliation     Unknown

 

                          Race                      White

 

                          Ethnic Group              Not  or 





Author





                          Author                    Rosibel Crump

 

                          Organization              Yesica Crump MD, [facility]

 

                          Address                   [address] [place]  [address]



 

                          Phone                     [phone]







Care Team Providers





                    Care Team Member Name Role                Phone

 

                    Yesica Crump     PP                  Unavailable

 

                          CCM                       Unavailable







Summary Purpose

Interface Exchange



Insurance Providers





             Payer name   Policy type / Coverage type Covered party ID Effective

 Begin Date 

Effective End Date

 

             [facility] Commercial Insurance [id number] 

Unknown      

Unknown







Family history





Mother



                          Diagnosis                 Age At Onset

 

                          Diabetes mellitus Type 2  Unknown

 

                          Alcoholism                Unknown

 

                          Depression                Unknown







Father



                          Diagnosis                 Age At Onset

 

                          Diabetes mellitus Type 2  Unknown







Social History





                Social History Element Codes           Description     Effective

 Dates

 

                    Marital status      Unknown             

Ha                                   08/15/2019

 

                Number of children Unknown         2               08/15/2019

 

                    Employment          Unknown             Currently employed

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

 

                Tobacco history SNOMED CT: 08524304 Current some days smoker 08/

15/2019

 

                Alcohol history SNOMED CT: 470984231 Never drinks alcohol 08/15/

2019







Allergies, Adverse Reactions, Alerts





           Substance  Reaction   Codes      Entered Date Inactivated Date Status

 

           Penicillin            Unknown    08/15/2019 No Inactive Date Active







Problems





                Condition       Codes           Effective Dates Condition Status

 

                          Cough                     ICD-10: R05.9

ICD-9: 786.2              10/25/2021                Active

 

                          Encounter for screening mammogram for malignant neopla

sm of breast ICD-10: 

Z12.31

ICD-9: V76.12             11/10/2020                Active

 

                          Generalized anxiety disorder ICD-10: F41.9

ICD-9: 300.00             10/25/2021                Active

 

                          Other allergic rhinitis   ICD-10: J30.89

ICD-9: 477.8              2020                Active

 

                          Menopausal and female climacteric states ICD-10: N95.1

ICD-9: 627.2              08/15/2019                Active

 

                          Encounter for general adult medical examination withou

t abnormal findings ICD-

10: Z00.00

ICD-9: V70.0              08/15/2019                Active

 

                          Screening, lipid          ICD-10: Z13.220

ICD-9: V77.91             10/22/2020                Active

 

                          Migraine without status migrainosus, not intractable, 

unspecified migraine type 

ICD-10: G43.909

ICD-9: 346.90             2020                Active

 

                          Headache                  ICD-10: R51

ICD-9: 784.0              2020                Active

 

                          Other acute sinusitis     ICD-10: J01.80

ICD-9: 461.8              2020                Active

 

                          Generalized anxiety disorder ICD-10: F41.1

ICD-9: 300.00             08/15/2019                Active

 

                                        Encounter for gynecological examination 

(general) (routine) without abnormal 

findings                                ICD-10: Z01.419

ICD-9: V72.31             2020                Active

 

                          Acute bronchitis          ICD-10: J20.9

ICD-9: 466.0              2019                Active

 

                          Cough                     ICD-10: R05

ICD-9: 786.2              2019                Active

 

                          Body aches                ICD-10: R52

ICD-9: 780.96             2019                Active

 

                          Fever                     ICD-10: R50.9

ICD-9: 780.60             2019                Active

 

                          Pain in left ankle and joints of left foot ICD-10: M25

.572

ICD-9: 719.47             2019                Active

 

                          Pain in left knee         ICD-10: M25.562

ICD-9: 719.46             2019                Active







Medications





          Medication Codes     Instructions Start Date Stop Date Status    Fill 

Instructions

 

                    promethazine 6.25 mg-codeine 10 mg/5 mL syrup RxNorm: 057467

      Take 5 

Milliliter(s) Oral every 4-6 hours as needed cough 10/25/2021          10/25/202

1          

Inactive                                 

 

                prednisone 20 mg tablet RxNorm: 549645  Take 2 Tablet(s) Oral ev

estevan day 

10/25/2021          10/29/2021          Inactive             

 

                    promethazine 6.25 mg-codeine 10 mg/5 mL syrup RxNorm: 798578

      Take 5 

Milliliter(s) Oral every 4-6 hours as needed cough 10/25/2021          11/03/202

1          

Inactive                                 

 

                    Kenalog 40 mg/mL suspension for injection RxNorm: 1528868   

  Take 1 Milliliter(s) 

Injection       10/25/2021      10/25/2021      Inactive         

 

                    alprazolam 0.5 mg tablet RxNorm: 831844      Take 1 Tablet(s

) Oral two times a day as

needed FOR ANXIETY 10/15/2021      2021      Active           

 

                    alprazolam 0.5 mg tablet RxNorm: 824718      1 Tablet(s) Ora

l two times a day as 

needed anxiety  2021      Inactive         

 

                    Depo-Estradiol 5 mg/mL intramuscular oil RxNorm: 971990     

 0.75 Milliliter(s) 

Intramuscular   2021      Inactive         

 

                    alprazolam 0.5 mg tablet RxNorm: 928826      1 Tablet(s) Ora

l two times a day as 

needed anxiety  2021      Inactive         

 

                    Depo-Estradiol 5 mg/mL intramuscular oil RxNorm: 758551     

 0.75 Milliliter(s) 

Intramuscular   2021      Inactive         

 

                    alprazolam 0.5 mg tablet RxNorm: 841287      1 Tablet(s) Ora

l two times a day as 

needed anxiety  2021      Inactive         

 

                    alprazolam 0.5 mg tablet RxNorm: 526988      1 Tablet(s) Ora

l two times a day as 

needed anxiety  04/15/2021      2021      Inactive         

 

                    Depo-Provera 150 mg/mL intramuscular suspension RxNorm: 1000

128     Milliliter(s) 

Intramuscular   2021      Inactive         

 

                    escitalopram 10 mg tablet RxNorm: 615576      TAKE ONE TABLE

T BY MOUTH EVERY NIGHT AT

BEDTIME Tablet(s) Oral 2021      Inactive         

 

                    alprazolam 0.5 mg tablet RxNorm: 055782      1 Tablet(s) Ora

l two times a day as 

needed anxiety  2021      Inactive         

 

                    escitalopram 5 mg tablet RxNorm: 564783      TAKE ONE TABLET

 BY MOUTH EVERY NIGHT AT 

BEDTIME         02/15/2021      2021      Inactive         

 

                    alprazolam 0.5 mg tablet RxNorm: 709821      1 Tablet(s) Ora

l two times a day as 

needed anxiety  02/15/2021      2021      Inactive         

 

                atorvastatin 20 mg tablet RxNorm: 105977  TAKE ONE TABLET BY SHELLY

TH DAILY 

2021          No Stop Date        Active               

 

                    Depo-Estradiol 5 mg/mL intramuscular oil RxNorm: 571734     

 3/4 Milliliter(s) 

Intramuscular monthly 2021      Inactive         

 

                    Depo-Provera 150 mg/mL intramuscular suspension RxNorm: 1000

128     Milliliter(s) 

Intramuscular   2021      Inactive         

 

                    alprazolam 0.5 mg tablet RxNorm: 022776      1 Tablet(s) Ora

l two times a day as 

needed anxiety  2020      Inactive         

 

                    acyclovir 400 mg tablet RxNorm: 763714      TAKE ONE TABLET 

BY MOUTH FOUR TIMES A DAY

                2020      Inactive         

 

                    alprazolam 0.5 mg tablet RxNorm: 564410      1 Tablet(s) Ora

l two times a day as 

needed anxiety  2020      12/15/2020      Inactive         

 

             atorvastatin 20 mg tablet RxNorm: 244401 1 Tablet(s) Oral every day

 2020                Inactive                   

 

             atorvastatin 20 mg tablet RxNorm: 708172 1 Tablet(s) Oral every day

 2020   

02/10/2021                Inactive                   

 

                    alprazolam 0.5 mg tablet RxNorm: 953982      1 Tablet(s) Ora

l two times a day as 

needed anxiety  10/23/2020      11/15/2020      Inactive         

 

                    Depo-Provera 150 mg/mL intramuscular suspension RxNorm: 1000

128     Milliliter(s) 

Intramuscular   10/20/2020      10/20/2020      Inactive         

 

                    acyclovir 400 mg tablet RxNorm: 212752      TAKE ONE TABLET 

BY MOUTH FOUR TIMES A DAY

                10/06/2020      11/15/2020      Inactive         

 

                    alprazolam 0.5 mg tablet RxNorm: 166110      1 Tablet(s) Ora

l two times a day as 

needed anxiety  2020      10/21/2020      Inactive         

 

                    alprazolam 0.5 mg tablet RxNorm: 708724      1 Tablet(s) Ora

l two times a day as 

needed anxiety  2020      Inactive         

 

                escitalopram 5 mg tablet RxNorm: 364940  1 Tablet(s) Oral every 

night at bedtime 

2020          Inactive             

 

             prednisone 20 mg tablet RxNorm: 617695 2 Tablet(s) Oral every day 0

2020                Inactive                   

 

                    Depo-Provera 150 mg/mL intramuscular suspension RxNorm: 1000

128     0.75 

Milliliter(s) Intramuscular 2020      Inactive         

 

                    Kenalog 40 mg/mL suspension for injection RxNorm: 5374906   

  1 Milliliter(s) 

Injection       2020      Inactive         

 

                Zithromax Z-Timur 250 mg tablet RxNorm: 277499  Tablet(s) Oral as 

directed 

2020          Inactive             

 

                    alprazolam 0.5 mg tablet RxNorm: 464946      1 Tablet(s) Ora

l two times a day as 

needed anxiety  2020      Inactive         

 

                    alprazolam 0.5 mg tablet RxNorm: 880459      1 Tablet(s) Ora

l two times a day as 

needed anxiety  2020      Inactive         

 

                    Depo-Provera 150 mg/mL intramuscular suspension RxNorm: 1000

128     Milliliter(s) 

Intramuscular   2020      Inactive         

 

                    acyclovir 400 mg tablet RxNorm: 499233      1 Tablet(s) Oral

 four times a day fever 

blisters        2020      Inactive         

 

                    Depo-Provera 150 mg/mL intramuscular suspension RxNorm: 1000

128     Milliliter(s) 

Intramuscular   2020      Inactive         

 

                pravastatin 10 mg tablet RxNorm: 059479  1 Tablet(s) Oral every 

night at bedtime 

2020          Inactive             

 

                pravastatin 10 mg tablet RxNorm: 304582  1 Tablet(s) Oral every 

night at bedtime 

2020          Inactive             

 

                    Depo-Estradiol 5 mg/mL intramuscular oil RxNorm: 597517     

 3/4 Milliliter(s) 

Intramuscular monthly 2020      Inactive         

 

                    Kenalog 40 mg/mL suspension for injection RxNorm: 8759371   

  Milliliter(s) 

Injection       2019      Inactive         

 

                    Phenergan with Codeine Syrup RxNorm:             5-10 Millil

iter(s) Oral Every 6 hrs as 

needed          2019      Inactive         

 

             prednisone 20 mg tablet RxNorm: 090384 2 Tablet(s) Oral every day 1

2019                Inactive                  start tomorrow

 

                    doxycycline hyclate 100 mg tablet RxNorm: 6580812     1 Tabl

et(s) Oral two times a 

day             2019      Inactive        start if symptom

s do not improve

 

                    Depo-Estradiol 5 mg/mL intramuscular oil RxNorm: 273338     

 3/4 Milliliter(s) 

Intramuscular monthly 2019      Inactive         

 

                    Depo-Estradiol 5 mg/mL intramuscular oil RxNorm: 872895     

 3/4 Milliliter(s) 

Intramuscular monthly 10/31/2019      2019      Inactive         

 

                    gabapentin 100 mg capsule RxNorm: 755727      1 Capsule(s) O

ral every night at 

bedtime         10/16/2019      2019      Inactive         

 

             gabapentin 100 mg capsule RxNorm: 375184 1 Capsule(s) PO QHS 09/12/

2019   

10/11/2019                Inactive                   

 

           naproxen 500 mg tablet RxNorm: 863180 1 Tablet(s) PO BID 2019 0

2019 

Inactive                                 

 

             escitalopram 5 mg tablet RxNorm: 956235 1 Tablet(s) PO QHS 08/15/20

19   2019

                          Inactive                   

 

             ranitidine 150 mg capsule RxNorm: 002558 1 Capsule(s) PO BID 2019                Inactive                   

 

             eszopiclone 1 mg tablet RxNorm: 455535 Tablet(s) PO as needed 2020                Inactive                   

 

                acyclovir 400 mg tablet RxNorm: 432271  1 Tablet(s) PO PRN fever

 blisters 

2020          Inactive             

 

             clonidine HCl 0.1 mg tablet RxNorm: 885774 Tablet(s) PO as needed 0

8/15/2019   

2019                Inactive                   







Medication Administered





             Medication   Codes        Instructions Start Date   Status

 

                Kenalog 40 mg/mL suspension for injection RxNorm: 8610420 1Milli

liter     10/25/2021

                                        No longer Active

 

                Depo-Estradiol 5 mg/mL intramuscular oil RxNorm: 099413  0.75Mil

liliter  

2021                              No longer Active

 

                Depo-Estradiol 5 mg/mL intramuscular oil RxNorm: 684565  0.75Mil

liliter  

2021                              No longer Active

 

                Depo-Provera 150 mg/mL intramuscular suspension RxNorm: 8350746 

Milliliter      

2021                              No longer Active

 

                Depo-Provera 150 mg/mL intramuscular suspension RxNorm: 6130604 

Milliliter      

2021                              No longer Active

 

                Depo-Provera 150 mg/mL intramuscular suspension RxNorm: 6139870 

Milliliter      

10/20/2020                              No longer Active

 

                Depo-Provera 150 mg/mL intramuscular suspension RxNorm: 7012612 

0.75Milliliter  

2020                              No longer Active

 

                Kenalog 40 mg/mL suspension for injection RxNorm: 9794116 1Milli

liter     2020

                                        No longer Active

 

                Depo-Provera 150 mg/mL intramuscular suspension RxNorm: 8612756 

Milliliter      

2020                              No longer Active

 

                Depo-Provera 150 mg/mL intramuscular suspension RxNorm: 6944170 

Milliliter      

2020                              No longer Active

 

                Depo-Estradiol 5 mg/mL intramuscular oil RxNorm: 490209  3/4Mill

ilitermonthly 

2020                              Active

 

             Kenalog 40 mg/mL suspension for injection RxNorm: 0590341 Millilite

r   2019   

No longer Active

 

                Depo-Estradiol 5 mg/mL intramuscular oil RxNorm: 620429  3/4Mill

ilitermonthly 

2019                              Active







Immunizations

No Immunization data



Results





          Observation Observation Code Item      Item Code Result    Date      S

[facility] Location

 

          Lipid     Ord30     CHOL                249 mg/dL 10/22/2020 Unknown

 

          Lipid     Ord30     HDL                 55.0 mg/dl 10/22/2020 Unknown

 

          Lipid     Ord30     TRIG                82 mg/dL  10/22/2020 Unknown

 

          Lipid     Ord30     LDL                 178 mg/dL 10/22/2020 Unknown

 

          Lipid     Ord30     C/HDL               4.5 Ratio 10/22/2020 Unknown

 

          Comp Metabolic Xka561    NA                  138 mEq/L 10/22/2020 Unkn

own

 

          Comp Metabolic Hry831    K                   4.5 mEq/L 10/22/2020 Unkn

own

 

          Comp Metabolic Imk119    CL                  103 mEq/L 10/22/2020 Unkn

own

 

          Comp Metabolic Zup994    CO2                 28.0 mEq/L 10/22/2020 Unk

nown

 

          Comp Metabolic Oei211    ANION GAP           12        10/22/2020 Unkn

own

 

          Comp Metabolic Qji730    GLUCOSE             90 mg/dL  10/22/2020 Unkn

own

 

          Comp Metabolic Gas721    Creat               0.7 mg/dL 10/22/2020 Unkn

own

 

          Comp Metabolic Lrb918    eGFR                88 ml/min/1.73m2 10/22/20

20 Unknown

 

          Comp Metabolic Ona749    BUN                 20 mg/dL  10/22/2020 Unkn

own

 

          Comp Metabolic Hin691    B/C Ratio           27.0 Ratio 10/22/2020 Unk

nown

 

          Comp Metabolic Ihu138    CALCIUM             9.5 mg/dL 10/22/2020 Unkn

own

 

          Comp Metabolic Rfu178    ALK PHOS            87 U/L    10/22/2020 Unkn

own

 

          Comp Metabolic Sii049    AST(SGOT)           17 U/L    10/22/2020 Unkn

own

 

          Comp Metabolic Fbx586    ALT(SGPT)           20 U/L    10/22/2020 Unkn

own

 

          Comp Metabolic Dma341    BILI T              0.6 mg/dL 10/22/2020 Unkn

own

 

          Comp Metabolic Zfh403    ALBUMIN             4.5 g/dL  10/22/2020 Unkn

own

 

          Comp Metabolic Xhb930    TPRO                7.6 g/dL  10/22/2020 Unkn

own

 

          Comp Metabolic Uti513    GLOB                3.1 g/dL  10/22/2020 Unkn

own

 

          Comp Metabolic Gix879    A/G Ratio           1.5 Ratio 10/22/2020 Unkn

own

 

          Comp Metabolic Tlj132    Osmo                278 mOsmo 10/22/2020 Unkn

own

 

          Cbc With Differential Ord2      WBC                 10.56 K/ul 10/22/2

020 Unknown

 

          Cbc With Differential Ord2      RBC                 4.44 M/ul 10/22/20

20 Unknown

 

          Cbc With Differential Ord2      HGB                 14.0 g/dl 10/22/20

20 Unknown

 

          Cbc With Differential Ord2      HCT                 42.8 %    10/22/20

20 Unknown

 

          Cbc With Differential Ord2      Neut%               73.9 %    10/22/20

20 Unknown

 

          Cbc With Differential Ord2      MCV                 96.4 fl   10/22/20

20 Unknown

 

          Cbc With Differential Ord2      Lymph%              18.2 %    10/22/20

20 Unknown

 

          Cbc With Differential Ord2      MCH                 31.5 pg   10/22/20

20 Unknown

 

          Cbc With Differential Ord2      Mono%               5.4 %     10/22/20

20 Unknown

 

          Cbc With Differential Ord2      Eos%                2.2 %     10/22/20

20 Unknown

 

          Cbc With Differential Ord2      MCHC                32.7 pg   10/22/20

20 Unknown

 

          Cbc With Differential Ord2      PLT                 258 K/ul  10/22/20

20 Unknown

 

          Cbc With Differential Ord2      Baso%               0.3 %     10/22/20

20 Unknown

 

          Cbc With Differential Ord2      Neut ABS#           7.81 K/ul 10/22/20

20 Unknown

 

          Cbc With Differential Ord2      RDW                 13.4 %    10/22/20

20 Unknown

 

          Cbc With Differential Ord2      Lymph ABS#           1.92 K/ul 10/22/2

020 Unknown

 

          Cbc With Differential Ord2      Mono ABS#           0.6 K/ul  10/22/20

20 Unknown

 

          Cbc With Differential Ord2      Eos ABS#            0.2 K/ul  10/22/20

20 Unknown

 

          Cbc With Differential Ord2      Baso ABS#           0.0 K/ul  10/22/20

20 Unknown

 

          Tsh       Ord6      TSH (3rd IS)           1.38 uIU/mL 2020 Unkn

own

 

          Comp Metabolic Tmw210    NA                  141 mEq/L 2020 Unkn

own

 

          Comp Metabolic Pio638    K                   4.3 mEq/L 2020 Unkn

own

 

          Comp Metabolic Qrb176    CL                  104 mEq/L 2020 Unkn

own

 

          Comp Metabolic Hoa332    CO2                 29.0 mEq/L 2020 Unk

nown

 

          Comp Metabolic Sow040    ANION GAP           12        2020 Unkn

own

 

          Comp Metabolic Zsh088    GLUCOSE             83 mg/dL  2020 Unkn

own

 

          Comp Metabolic Syb977    Creat               0.7 mg/dL 2020 Unkn

own

 

          Comp Metabolic Xtv569    eGFR                102 ml/min/1.73m2 

020 Unknown

 

          Comp Metabolic Epf207    BUN                 16 mg/dL  2020 Unkn

own

 

          Comp Metabolic Aer420    B/C Ratio           24.6 Ratio 2020 Unk

nown

 

          Comp Metabolic Nnl202    CALCIUM             9.3 mg/dL 2020 Unkn

own

 

          Comp Metabolic Qxe312    ALK PHOS            79 U/L    2020 Unkn

own

 

          Comp Metabolic Zvg773    AST(SGOT)           19 U/L    2020 Unkn

own

 

          Comp Metabolic Koi893    ALT(SGPT)           26 U/L    2020 Unkn

own

 

          Comp Metabolic Ixw738    BILI T              0.5 mg/dL 2020 Unkn

own

 

          Comp Metabolic Exp894    ALBUMIN             4.2 g/dL  2020 Unkn

own

 

          Comp Metabolic Vwr570    TPRO                7.0 g/dL  2020 Unkn

own

 

          Comp Metabolic Mbf705    GLOB                2.8 g/dL  2020 Unkn

own

 

          Comp Metabolic Yhu670    A/G Ratio           1.5 Ratio 2020 Unkn

own

 

          Comp Metabolic Wkq709    Osmo                282 mOsmo 2020 Unkn

own

 

          Cbc With Differential Ord2      WBC                 8.94 K/ul 20

20 Unknown

 

          Cbc With Differential Ord2      RBC                 4.34 M/ul 20

20 Unknown

 

          Cbc With Differential Ord2      HGB                 13.3 g/dl 20

20 Unknown

 

          Cbc With Differential Ord2      HCT                 40.7 %    20

20 Unknown

 

          Cbc With Differential Ord2      Neut%               71.8 %    20

20 Unknown

 

          Cbc With Differential Ord2      MCV                 93.8 fl   20

20 Unknown

 

          Cbc With Differential Ord2      Lymph%              19.5 %    20

20 Unknown

 

          Cbc With Differential Ord2      MCH                 30.6 pg   20

20 Unknown

 

          Cbc With Differential Ord2      Mono%               6.2 %     20

20 Unknown

 

          Cbc With Differential Ord2      Eos%                2.2 %     20

20 Unknown

 

          Cbc With Differential Ord2      MCHC                32.7 pg   20

20 Unknown

 

          Cbc With Differential Ord2      Baso%               0.3 %     20

20 Unknown

 

          Cbc With Differential Ord2      PLT                 262 K/ul  20

20 Unknown

 

          Cbc With Differential Ord2      RDW                 13.4 %    20

20 Unknown

 

          Cbc With Differential Ord2      Neut ABS#           6.42 K/ul 20

20 Unknown

 

          Cbc With Differential Ord2      Lymph ABS#           1.74 K/ul 

020 Unknown

 

          Cbc With Differential Ord2      Mono ABS#           0.6 K/ul  20

20 Unknown

 

          Cbc With Differential Ord2      Eos ABS#            0.2 K/ul  20

20 Unknown

 

          Cbc With Differential Ord2      Baso ABS#           0.0 K/ul  20

20 Unknown

 

          Lipid     Ord30     CHOL                240 mg/dL 2020 Unknown

 

          Lipid     Ord30     HDL                 57.0 mg/dl 2020 Unknown

 

          Lipid     Ord30     TRIG                80 mg/dL  2020 Unknown

 

          Lipid     Ord30     LDL                 167 mg/dL 2020 Unknown

 

          Lipid     Ord30     C/HDL               4.2 Ratio 2020 Unknown

 

          C A/B FLU [id number]   Influenza A Scr           Negative  2019 Unk

nown

 

          C A/B FLU [id number]   Influenza B Scr           Negative  2019 Unk

nown

 

           C A/B FLU  [id number]    Influenza Intrp B AG:PRID:PT:NOSE:NOM:IF       

     See Footnote  

2019                              Unknown







Procedures





                    Procedure           Codes               Date

 

                    THER/PROPH/DIAG INJ SC/IM CPT-4: 90672        2021

 

                    THER/PROPH/DIAG INJ SC/IM CPT-4: 82187        2021

 

                    THER/PROPH/DIAG INJ SC/IM CPT-4: 40587        2021

 

                    THER/PROPH/DIAG INJ SC/IM CPT-4: 09961        2021

 

                    THER/PROPH/DIAG INJ SC/IM CPT-4: 42975        10/20/2020

 

                    THER/PROPH/DIAG INJ SC/IM CPT-4: 84213        2020

 

                    TRIAMCINOLONE ACET INJ NOS 10 mg CPT-4:         

020

 

                    THER/PROPH/DIAG INJ SC/IM CPT-4: 75447        2020

 

                    THER/PROPH/DIAG INJ SC/IM CPT-4: 70450        2020

 

                    THER/PROPH/DIAG INJ SC/IM CPT-4: 48922        2020

 

                    TRIAMCINOLONE ACET INJ NOS 10 mg CPT-4:         

019

 

                    THER/PROPH/DIAG INJ SC/IM CPT-4: 97974        2019







Vital Signs





                          Date                      Vital

 

                10/25/2021      Blood Pressure 1: 142/68 Code: 8480-6 BMI: 30.7 

Code: 39724-2 Heart 

Rate 1: 63 bpm      Height: 5'2" Code: 8302-2 SpO2: 97%           Temperature: 3

6.4 (C) / 97.6 

(F)                                     Weight: 168 lbs  Code: 14524-5

 

                2020      Blood Pressure 1: 130/80 Code: 8480-6 BMI: 28.5 

Code: 99836-8 Heart 

Rate 1: 69 bpm      Height: 5'2" Code: 8302-2 SpO2: 97%           Temperature: 3

6.9 (C) / 98.4 

(F)                                     Weight: 156 lbs  Code: 65662-3

 

                2020      Blood Pressure 1: 128/78 Code: 8480-6 Heart Rate

 1: 74 bpm Height:  

Code: 8302-2              SpO2: 98%                 Weight:  Code: 21550-5

 

                    2020          Height:  Code: 8302-2 Weight:  Code: 294

63-7

 

                2020      Blood Pressure 1: 132/80 Code: 8480-6 BMI: 28.9 

Code: 73173-6 Heart 

Rate 1: 68 bpm      Height: 5'2" Code: 8302-2 SpO2: 97%           Weight: 158 lb

s  Code: 

55770-4

 

                2019      Blood Pressure 1: 110/60 Code: 8480-6 BMI: 28.5 

Code: 02655-1 Heart 

Rate 1: 76 bpm      Height: 5'2" Code: 8302-2 SpO2: 98%           Temperature: 3

6.9 (C) / 98.5 

(F)                                     Weight: 156 lbs  Code: 58948-1

 

             2019   Blood Pressure 1: 124/80 Code: 8480-6 Heart Rate 1: 81

 bpm SpO2: 98%    

Temperature: 36.7 (C) / 98.1 (F)

 

                2019      Blood Pressure 1: 130/78 Code: 8480-6 BMI: 28.0 

Code: 34974-2 Heart 

Rate 1: 68 bpm      Height: 5'2" Code: 8302-2 SpO2: 98%           Weight: 153 lb

s  Code: 

07138-0

 

                2019      Heart Rate 1: 68 bpm Height:  Code: 8302-2 Weigh

t:  Code: 23617-2

 

                2019      Blood Pressure 1: 132/74 Code: 8480-6 BMI: 27.4 

Code: 26403-4 Heart 

Rate 1: 66 bpm      Height: 5'2" Code: 8302-2 SpO2: 98%           Weight: 150 lb

s  Code: 

13062-1

 

                08/15/2019      Blood Pressure 1: 104/60 Code: 8480-6 BMI: 27.5 

Code: 07085-1 Heart 

Rate 1: 66 bpm      Height: 5'2" Code: 8302-2 SpO2: 98%           Weight: 150 lb

s 5 oz Code: 

53549-4







Functional Status

No Functional Status data



Reason For Visit





                    Reason For Visit    Effective Dates     Notes

 

                    sinus congestion    10/25/2021           

 

                    headache            2020           

 

                    headache            2020           

 

                    well woman exam (40-65 years) 2020           

 

                    cough               2019           

 

                    fever               2019           

 

                    menopausal symptoms 2019           

 

                    lower leg pain      2019           

 

                    lower leg pain      2019           

 

                    anxiety             08/15/2019           







Encounters





             Encounter    Performer    Location     Codes        Date

 

                                        () 21158 EST. PATIENT, LEVEL IV

Diagnosis: Generalized anxiety disorder[ICD10: F41.9]

Diagnosis: Cough[ICD10: R05.9]

Diagnosis: Other allergic rhinitis[ICD10: J30.89]

Diagnosis: Encounter for screening mammogram for malignant neoplasm of 
breast[ICD10: Z12.31] Eulalia Crump MD, [facility] CPT-4: 22756    

10/25/2021

 

                                        [phone]) 54395 EST. PATIENT, LEVEL III

Diagnosis: Headache[ICD10: R51]

Diagnosis: Other allergic rhinitis[ICD10: J30.89] Bree Gutierres MD, [facility]          CPT-4: 91646              2020

 

                                        97689 EST. PATIENT, LEVEL III

Diagnosis: Other acute sinusitis[ICD10: J01.80]

Diagnosis: Other allergic rhinitis[ICD10: J30.89]

Diagnosis: Migraine without status migrainosus, not intractable, unspecified 
migraine type[ICD10: G43.909]

Diagnosis: Menopausal and female climacteric states[ICD10: N95.1] Flakita Crump MD, [facility]    CPT-4: 66665              2020

 

                                        [phone]) 33903 EST. PATIENT, LEVEL III

Diagnosis: Generalized anxiety disorder[ICD10: F41.1] Yesica Crump MD, [facility]          CPT-4: 35782              2020

 

                                        (38948) PREV VISIT EST AGE 40-64

Diagnosis: Encounter for gynecological examination (general) (routine) without 
abnormal findings[ICD10: Z01.419] Bree Crump MD, [facility] CPT

-4:

48049                                   2020

 

                                        [phone]) 73784 EST. PATIENT, LEVEL III

Diagnosis: Cough[ICD10: R05]

Diagnosis: Acute bronchitis[ICD10: J20.9] Bree carrasco MD, 

[facility]                       CPT-4: 38407              2019

 

                                        [phone]) 95664 EST. PATIENT, LEVEL III

Diagnosis: Fever[ICD10: R50.9]

Diagnosis: Body aches[ICD10: R52] Bree Crump MD, [facility] CPT

-4:

69072                                   2019

 

                                        [phone]) 99254 EST. PATIENT, LEVEL III

Diagnosis: Menopausal and female climacteric states[ICD10: N95.1] Bree 

[facility] CPT-4: 93262        2019

 

                                        25185 EST. PATIENT, LEVEL III

Diagnosis: Pain in left ankle and joints of left foot[ICD10: M25.572]

Diagnosis: Pain in left knee[ICD10: M25.562] Flakita jackson MD, [facility]

                          CPT-4: 08162              2019

 

                                        15468 EST. PATIENT, LEVEL III

Diagnosis: Pain in left ankle and joints of left foot[ICD10: M25.572]

Diagnosis: Pain in left knee[ICD10: M25.562] Flakita jackson MD, [facility]

                          CPT-4: 98405              2019

 

                                        (17594) PREV VISIT NEW AGE 40-64

Diagnosis: Encounter for general adult medical examination without abnormal 
findings[ICD10: Z00.00] Yesica Crump MD, [facility] CPT-4: 05732    

08/15/2019







Plan of Care





             Planned Activity Notes        Codes        Status       Date

 

                          Visit Plan:               Allergies exacerbation with 

cough - chronic - recommended pt to use

allergy medication as prescribed. Pt has been counseled as to the appropriate 
use of the medication. Pt to call if allergy symptoms are not controlled with 
the medication. If using nasal spray, instructions as follows: Nasal spray- use 
twice daily, one spray per nostril twice daily, after 30 minutes, rinse out nose
with saline spray.. Use opposite hand per nostril to spray in the nasal steroid 
allergy spray. Kenalog injection given at visit. Prednisone rx sent. Phenergan 
with codeine cough medicine sent. Screening mammogram - orders sent. Anxiety - 
controlled. Continue prn alprazolam at bedtime and escitalopram. Fasting lab 
orders sent

                                                            10/25/2021

 

                                        Appointment: Eulalia Osman 

WPtel:[phone]

                                        [address] [place], [place] [address]

                                              (30 min) Complex 10/25/2021

 

             Patient Education: Patient Medication Summary                      

     Completed    10/25/2021

 

             Patient Education: prednisone- [employer] [id number]         

                  Completed    

10/25/2021

 

             Care Plan: Lipid                           Pending      10/25/2021

 

             Care Plan: Cbc With Differential                           Pending 

     10/25/2021

 

             Care Plan: Comp Metabolic                           Pending      10

/

 

             Care Plan: Tsh                           Pending      10/25/2021

 

             Appointment:                            Injection    2021

 

             Patient Education: Patient Medication Summary                      

     Completed    2021

 

             Appointment:                            Injection    2021

 

             Patient Education: Patient Medication Summary                      

     Completed    2021

 

             Appointment:                            Injection    2021

 

             Appointment:                            Injection    2021

 

             Patient Education: Patient Medication Summary                      

     Completed    2021

 

             Appointment:                            Injection    2021

 

             Patient Education: Patient Medication Summary                      

     Completed    2021

 

             Patient Education: Patient Medication Summary                      

     Completed    11/10/2020

 

             Patient Education: Patient Medication Summary                      

     Completed    10/22/2020

 

             Appointment:                            Injection    10/20/2020

 

             Patient Education: Patient Medication Summary                      

     Completed    10/20/2020

 

                          Visit Plan:               Allergies - chronic - recomm

ended pt to use allergy medication as 

prescribed. Pt has been counseled as to the appropriate use of the medication. 
Pt to call if allergy symptoms are not controlled with the medication. If using 
nasal spray, instructions as follows: Nasal spray- use twice daily, one spray 
per nostril twice daily, after 30 minutes, rinse out nose with saline spray.. 
Use opposite hand per nostril to spray in the nasal steroid allergy spray. 
Headache -will treat allergies/congestion - instructed patient to call if does 
not improve and we will order CT Head without contrast.

                                                            2020

 

                                        Appointment: Bree Oleary 

WPtel:[phone]

                                        [address] [place], [place] [address]

                                              (15 min) Moderate 2020

 

             Patient Education: Patient Medication Summary                      

     Completed    2020

 

                          Visit Plan:               Sinusitis - Pt has acute inf

ection - pain in face, maxillary 

region, Pt informed to use decongestant, RX given to patient, sinus rinses also 
recommended. Call if symptoms do not show improvement. Allergies - chronic - 
recommended pt to use allergy medication as prescribed. Pt has been counseled as
to the appropriate use of the medication. Pt to call if allergy symptoms are not
controlled with the medication. If using nasal spray, instructions as follows: 
Nasal spray- use twice daily, one spray per nostril twice daily, after 30 
minutes, rinse out nose with saline spray.. Use opposite hand per nostril to 
spray in the nasal steroid allergy spray. Migraine - will treat for sinus 
infection - pt is to notify clinic if symptoms do not improve, if they worsen, 
or with any changes, questions, or concerns.

                                                            2020

 

                                        Appointment: Flakita Castro 

WPtel:[phone] [address] [facility]

                                              (30 min) Complex 2020

 

             Patient Education: Patient Medication Summary                      

     Completed    2020

 

                          Visit Plan:               Anxiety - the patient has un

controlled anxiety and will benefit 

from a short term dose of xanax for control of symptoms.

                                                            2020

 

                                        Appointment: Yesica Crump 

WPtel:[phone]

                                        [facility]

                                              TeleHealth      2020

 

             Patient Education: Patient Medication Summary                      

     Completed    2020

 

             Appointment:                            Injection    2020

 

             Patient Education: Patient Medication Summary                      

     Completed    2020

 

             Appointment:                            Injection    2020

 

             Appointment:                            Injection    2020

 

             Patient Education: Patient Medication Summary                      

     Completed    2020

 

                                        Appointment: Yesica Crump 

WPtel:[phone]

                                        [facility]

                                              Well Woman      03/10/2020

 

                          Visit Plan:               Well Adult Female - exam com

pleted. Pap and breast exam completed. 

Pt will be called with results of her testing. She was advised to continue with 
yearly annual exams. Safe sex practices discussed during office visit today. 
Call if any abnormal gynecologic issues during the next year, otherwise, RTC 
yearly or prn.

                                                            2020

 

                                        Appointment: Bree Oleary 

WPtel:[phone]

                                        [facility] [facility]

                                              Well Woman      2020

 

             Patient Education: Patient Medication Summary                      

     Completed    2020

 

                                        Appointment: Yesica Crump 

WPtel:[phone]

                                        [facility]

                                              (15 min) Moderate 2020

 

             Appointment:                            Injection    2020

 

             Patient Education: Patient Medication Summary                      

     Completed    2020

 

                          Visit Plan:               Bronchitis - acute case of b

ronchitis identified. Pt has been given

antibiotics, breathing treatments as appropriate, and pt has been instructed to 
call if symptoms are not improved, or if symptoms acutely worsen.

                                                            2019

 

                                        Appointment: Bree Oleary 

WPtel:[phone]

                                        [facility]

                                              (15 min) Moderate 2019

 

             Patient Education: Patient Medication Summary                      

     Completed    2019

 

                          Visit Plan:               URI -viral -flu swab to r/o 

flu - Pt advised to increase fluids, 

vitamin C. Discussed natural and expected course of this diagnosis and need to 
alert me if symptoms do not follow expected course, or if any worse.

                                                            2019

 

                                        Appointment: Bree Oleary 

WPtel:[phone]

                                        [facility]

                                              (30 min) Complex 2019

 

             Patient Education: Patient Medication Summary                      

     Completed    2019

 

                          Visit Plan:               Post-surgical menopause - co

ntinue to taper depo dose and 

eventually off medication completely -injection today in the office.

                                                            2019

 

                                        Appointment: Bree Oleary 

WPtel:[phone]

                                        [facility]

                                              (15 min) Moderate 2019

 

             Patient Education: Patient Medication Summary                      

     Completed    2019

 

                                        Appointment: Bree Oleary 

WPtel:[phone]

                                        [facility]

                                              (30 min) Complex 2019

 

                          Visit Plan:               Left knee, ankle, foot pain 

- will send RX - will refer to ortho - 

The pt is to use prn antiinflammatories to manage acute pain. The patient is to 
call the office if the pain is worsening or does not improve.

                                                            2019

 

                                        Appointment: Flakita Castro 

WPtel:[phone]

                                        [facility]

                                              (30 min) Complex 2019

 

             Patient Education: Patient Medication Summary                      

     Completed    2019

 

                    Care Plan: Referral Order                     SNOMED-CT : 30

[id number]

                          Pending                   2019

 

                          Visit Plan:               Left knee, ankle, foot pain 

- will send RX - if no improvement will

refer to ortho - The pt is to use prn antiinflammatories to manage acute pain. 
The patient is to call the office if the pain is worsening or does not improve.

                                                            2019

 

                                        Appointment: Flakita Castor 

WPtel:[phone]

                                        [facility]

                                              (30 min) Complex 2019

 

             Patient Education: Patient Medication Summary                      

     Completed    2019

 

                          Visit Plan:               Well Adult - pt was counsele

d about diet, exercise, and encouraged 

to follow a heart healthy diet and increase activity level. The patient was 
instructed to RTC yearly for well adult exams and PRN for acute illnesses. The 
pt was also instructed to have yearly labs for check of cholesterol, thyroid, 
chem panel, CBC, and renal functioning. Post-surgical menopause - I have advised
the pt that we will look for her medications from [facility] to find out what her depo 
dose is so I can wean her off of it. Anxiety - the patient has uncontrolled 
anxiety and will benefit from an SSRI on a daily basis to attempt control of the
symptoms of anxiety (tachycardia, overwhelming sensations, stress, insomnia, 
etc). stop clonidine due to hypotension

                                                            08/15/2019

 

             Patient Education: Patient Medication Summary                      

     Completed    08/15/2019

 

             Referral: Álvaro Sellers  Referral                  Appointment Co

nfirmed  

 

             Referral: Álvaro Sellers  Referral                  Completed     







Instructions





                                        Comment

 

                                        DUE FOR ANNUAL MAMMOGRAM- order faxed



DUE TO ANNUAL FASTING LABS- orders given at visit



CONTINUE XYZOL OR ZYRTEC



STEROID SHOT TODAY



PREDNISONE TABLETS TAKE 2 TABLETS DAILY X 5 DAYS



PHENERGAN W/CODEINE COUGH MEDICINE

                                        . Allergies exacerbation with cough - ch

ronic - recommended pt to use allergy 

medication as prescribed.  Pt has been counseled as  to the appropriate use of 
the medication.  Pt to call if allergy symptoms are not controlled with the 
medication.

If using nasal spray, instructions as follows: Nasal spray- use twice daily, one
spray per nostril twice daily, after 30 minutes, rinse out nose with saline 
spray.. Use opposite hand per nostril to spray in the nasal steroid allergy 
spray.  Kenalog injection given at visit. Prednisone rx sent. Phenergan with 
codeine cough medicine sent. 



Screening mammogram - orders sent.



Anxiety - controlled. Continue prn alprazolam at bedtime and escitalopram. 



Fasting lab orders sent







 

                                        . Allergies - chronic - recommended pt t

o use allergy medication as prescribed. 

Pt has been counseled as  to the appropriate use of the medication.  Pt to call 
if allergy symptoms are not controlled with the medication.

If using nasal spray, instructions as follows: Nasal spray- use twice daily, one
spray per nostril twice daily, after 30 minutes, rinse out nose with saline 
spray.. Use opposite hand per nostril to spray in the nasal steroid allergy 
spray.



Headache -will treat allergies/congestion - instructed patient to call if does 
not improve and we will order CT Head without contrast. 



 

                                        . Sinusitis - Pt has acute infection - p

ain in face, maxillary region, Pt 

informed to use decongestant, RX given to patient, sinus rinses also 
recommended.  Call if symptoms do not show improvement.



Allergies - chronic - recommended pt to use allergy medication as prescribed.  
Pt has been counseled as  to the appropriate use of the medication.  Pt to call 
if allergy symptoms are not controlled with the medication.

If using nasal spray, instructions as follows: Nasal spray- use twice daily, one
spray per nostril twice daily, after 30 minutes, rinse out nose with saline 
spray.. Use opposite hand per nostril to spray in the nasal steroid allergy 
spray.



Migraine - will treat for sinus infection - pt is to notify clinic if symptoms 
do not improve, if they worsen, or with any changes, questions, or concerns. 

 

                                        . Anxiety - the patient has uncontrolled

 anxiety and will benefit from a short 

term dose of xanax for control of symptoms.

 

                                        . Well Adult Female - exam completed.  P

ap and  breast exam completed.  Pt will 

be called with results of her testing.  She was advised to continue with yearly 
annual exams.   Safe sex practices discussed during office visit today.  Call if
any abnormal gynecologic issues during the next year, otherwise, RTC yearly or 
prn.



 

                                        kenalog 

                                        . Bronchitis - acute case of bronchitis 

identified.  Pt has been given 

antibiotics, breathing treatments as appropriate, and pt has been instructed to 
call if symptoms are not improved, or if symptoms acutely worsen.



 

                                        . URI -viral -flu swab to r/o flu - Pt a

dvised to increase fluids, vitamin C.  

Discussed natural and expected course of this diagnosis and need to alert me if 
symptoms do not follow expected course, or if any worse.



 

                                        . Post-surgical menopause - continue to 

taper depo dose and eventually off 

medication completely -injection today in the office. 



 

                                        tomorrow at 10:15 with the nurse elbert benavides. Left knee, ankle, foot pain - 

will send RX - will refer to ortho - The pt is to use prn antiinflammatories to 
manage acute pain. The patient is to call the office if the pain is worsening or
does not improve. 





 

                                        . Left knee, ankle, foot pain - will sen

d RX - if no improvement will refer to 

ortho - The pt is to use prn antiinflammatories to manage acute pain. The 
patient is to call the office if the pain is worsening or does not improve. 



 

                                        . Well Adult - pt was counseled about di

et, exercise, and encouraged to follow a

heart healthy diet and increase activity level.  The patient was instructed to 
RTC yearly for well adult exams and PRN for acute illnesses.  The pt was also 
instructed to have yearly labs for check of cholesterol, thyroid, chem panel, 
CBC, and renal functioning.



Post-surgical menopause - I have advised the pt that we will look for her 
medications from [facility] to find out what her depo dose is so I can wean her off of 
it.



Anxiety - the patient has uncontrolled anxiety and will benefit from an SSRI on 
a daily basis to attempt control of the symptoms of anxiety (tachycardia, 
overwhelming sensations, stress, insomnia, etc). 

stop clonidine due to hypotension







Medical Equipment

No Medical Equipment data



Health Concerns Section

Health Concerns data not found



Goals Section

Goals data not found



Interventions Section

Interventions data not found



Health Status Evaluations/Outcomes Section

Health Status Evaluations/Outcomes data not found



Advance Directives

No Advance Directive data

## 2021-12-28 NOTE — XMS REPORT
CCD document using C-CDA

                             Created on: 2021



AyalaRosibel vegas

External Reference #: [id number]

: 1970

Sex: Female



Demographics





                          Address                   [address]

[place]  [address]

 

                          Home Phone                [phone]

 

                          Preferred Language        Unknown

 

                          Marital Status            Unknown

 

                          Holiness Affiliation     Unknown

 

                          Race                      White

 

                          Ethnic Group              Not  or 





Author





                          Author                    Rosibel Crump

 

                          Organization              Yesica Crump MD, [facility]

 

                          Address                   [address] [place]  [address]



 

                          Phone                     [phone]







Care Team Providers





                    Care Team Member Name Role                Phone

 

                    Yesica Crump     PP                  Unavailable

 

                          CCM                       Unavailable







Summary Purpose

Interface Exchange



Insurance Providers





             Payer name   Policy type / Coverage type Covered party ID Effective

 Begin Date 

Effective End Date

 

             [facility] Commercial Insurance [id number] 

Unknown      

Unknown







Family history





Mother



                          Diagnosis                 Age At Onset

 

                          Diabetes mellitus Type 2  Unknown

 

                          Alcoholism                Unknown

 

                          Depression                Unknown







Father



                          Diagnosis                 Age At Onset

 

                          Diabetes mellitus Type 2  Unknown







Social History





                Social History Element Codes           Description     Effective

 Dates

 

                    Marital status      Unknown             

Ha                                   08/15/2019

 

                Number of children Unknown         2               08/15/2019

 

                    Employment          Unknown             Currently employed

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

 

                Tobacco history SNOMED CT: 93158944 Current some days smoker 08/

15/2019

 

                Alcohol history SNOMED CT: 113627941 Never drinks alcohol 08/15/

2019







Allergies, Adverse Reactions, Alerts





           Substance  Reaction   Codes      Entered Date Inactivated Date Status

 

           Penicillin            Unknown    08/15/2019 No Inactive Date Active







Problems





                Condition       Codes           Effective Dates Condition Status

 

                          Chronic cough             ICD-10: R05.3

ICD-9: 786.2              2021                Active

 

                          Dysphagia                 ICD-10: r13.10

ICD-9: 787.20             2021                Active

 

                          Laryngitis                ICD-10: J04.0

ICD-9: 464.00             2021                Active

 

                          Other allergic rhinitis   ICD-10: J30.89

ICD-9: 477.8              2020                Active

 

                          Acute bronchitis          ICD-10: J20.9

ICD-9: 466.0              2019                Active

 

                          Slow transit constipation ICD-10: K59.01

ICD-9: 564.01             2021                Active

 

                          Cough                     ICD-10: R05.9

ICD-9: 786.2              10/25/2021                Active

 

                          Encounter for screening mammogram for malignant neopla

sm of breast ICD-10: 

Z12.31

ICD-9: V76.12             11/10/2020                Active

 

                          Generalized anxiety disorder ICD-10: F41.9

ICD-9: 300.00             10/25/2021                Active

 

                          Menopausal and female climacteric states ICD-10: N95.1

ICD-9: 627.2              08/15/2019                Active

 

                          Encounter for general adult medical examination withou

t abnormal findings ICD-

10: Z00.00

ICD-9: V70.0              08/15/2019                Active

 

                          Screening, lipid          ICD-10: Z13.220

ICD-9: V77.91             10/22/2020                Active

 

                          Migraine without status migrainosus, not intractable, 

unspecified migraine type 

ICD-10: G43.909

ICD-9: 346.90             2020                Active

 

                          Headache                  ICD-10: R51

ICD-9: 784.0              2020                Active

 

                          Other acute sinusitis     ICD-10: J01.80

ICD-9: 461.8              2020                Active

 

                          Generalized anxiety disorder ICD-10: F41.1

ICD-9: 300.00             08/15/2019                Active

 

                                        Encounter for gynecological examination 

(general) (routine) without abnormal 

findings                                ICD-10: Z01.419

ICD-9: V72.31             2020                Active

 

                          Cough                     ICD-10: R05

ICD-9: 786.2              2019                Active

 

                          Body aches                ICD-10: R52

ICD-9: 780.96             2019                Active

 

                          Fever                     ICD-10: R50.9

ICD-9: 780.60             2019                Active

 

                          Pain in left ankle and joints of left foot ICD-10: M25

.572

ICD-9: 719.47             2019                Active

 

                          Pain in left knee         ICD-10: M25.562

ICD-9: 719.46             2019                Active







Medications





          Medication Codes     Instructions Start Date Stop Date Status    Fill 

Instructions

 

                    albuterol sulfate HFA 90 mcg/actuation aerosol inhaler RxNor

m: 1563544     Inhale 2 

Inhalation every 4-6 hours as needed cough 2021      No Stop Date    Activ

e           

 

                    atorvastatin 20 mg tablet RxNorm: 790765      Take 1 Tablet(

s) Oral every night at 

bedtime         2021      Active           

 

                Miralax 17 gram/dose oral powder RxNorm: 816854  Take 1 scoop Or

al every day 

2021          12/15/2021          Active               

 

                    Allegra Allergy 180 mg tablet RxNorm: 193420      Take 1 Tab

let(s) Oral every day for

first 5 days take twice daily 2021      No Stop Date    Active           

 

                    omeprazole 20 mg tablet,delayed release RxNorm: 408944      

Take 1 Tablet(s) Oral 

every day       2021      Inactive         

 

                prednisone 20 mg tablet RxNorm: 257637  Take 2 Tablet(s) Oral ev

estevan day 

2021          Inactive             

 

                    azithromycin 250 mg tablet RxNorm: 709659      Take 1 Tablet

(s) Oral every day take 2

tablets day 1, then take 1 tablet daily on days 2-5 2021

21          

Inactive                                Please disregard Cefdinir RX.

 

                cefdinir 300 mg capsule RxNorm: 230285  Take 1 Capsule(s) Oral t

wo times a day 

2021          Inactive             

 

                    azithromycin 250 mg tablet RxNorm: 207321      Take 1 Tablet

(s) Oral every day take 2

tablets day 1, then take 1 tablet daily on days 2-5 2021

21          

Inactive                                Please disregard Cefdinir RX.

 

                    promethazine 6.25 mg-codeine 10 mg/5 mL syrup RxNorm: 882704

      Take 5 

Milliliter(s) Oral every 4-6 hours as needed cough 10/25/2021          10/25/202

1          

Inactive                                 

 

                prednisone 20 mg tablet RxNorm: 866658  Take 2 Tablet(s) Oral ev

estevan day 

10/25/2021          10/29/2021          Inactive             

 

                    promethazine 6.25 mg-codeine 10 mg/5 mL syrup RxNorm: 553038

      Take 5 

Milliliter(s) Oral every 4-6 hours as needed cough 10/25/2021          11/03/202

1          

Inactive                                 

 

                    Kenalog 40 mg/mL suspension for injection RxNorm: 6693818   

  Take 1 Milliliter(s) 

Injection       10/25/2021      10/25/2021      Inactive         

 

                    alprazolam 0.5 mg tablet RxNorm: 927908      Take 1 Tablet(s

) Oral two times a day as

needed FOR ANXIETY 10/15/2021      2021      Inactive         

 

                    alprazolam 0.5 mg tablet RxNorm: 475271      1 Tablet(s) Ora

l two times a day as 

needed anxiety  2021      Inactive         

 

                    Depo-Estradiol 5 mg/mL intramuscular oil RxNorm: 623657     

 0.75 Milliliter(s) 

Intramuscular   2021      Inactive         

 

                    alprazolam 0.5 mg tablet RxNorm: 857013      1 Tablet(s) Ora

l two times a day as 

needed anxiety  2021      Inactive         

 

                    Depo-Estradiol 5 mg/mL intramuscular oil RxNorm: 187050     

 0.75 Milliliter(s) 

Intramuscular   2021      Inactive         

 

                    alprazolam 0.5 mg tablet RxNorm: 071256      1 Tablet(s) Ora

l two times a day as 

needed anxiety  2021      Inactive         

 

                    alprazolam 0.5 mg tablet RxNorm: 235259      1 Tablet(s) Ora

l two times a day as 

needed anxiety  04/15/2021      2021      Inactive         

 

                    Depo-Provera 150 mg/mL intramuscular suspension RxNorm: 1000

128     Milliliter(s) 

Intramuscular   2021      Inactive         

 

                    escitalopram 10 mg tablet RxNorm: 106784      TAKE ONE TABLE

T BY MOUTH EVERY NIGHT AT

BEDTIME Tablet(s) Oral 2021      Inactive         

 

                    alprazolam 0.5 mg tablet RxNorm: 661446      1 Tablet(s) Ora

l two times a day as 

needed anxiety  2021      Inactive         

 

                    escitalopram 5 mg tablet RxNorm: 367537      TAKE ONE TABLET

 BY MOUTH EVERY NIGHT AT 

BEDTIME         02/15/2021      2021      Inactive         

 

                    alprazolam 0.5 mg tablet RxNorm: 786029      1 Tablet(s) Ora

l two times a day as 

needed anxiety  02/15/2021      2021      Inactive         

 

                atorvastatin 20 mg tablet RxNorm: 347367  TAKE ONE TABLET BY SHELLY

TH DAILY 

2021          Inactive             

 

                    Depo-Estradiol 5 mg/mL intramuscular oil RxNorm: 683830     

 3/4 Milliliter(s) 

Intramuscular monthly 2021      Inactive         

 

                    Depo-Provera 150 mg/mL intramuscular suspension RxNorm: 1000

128     Milliliter(s) 

Intramuscular   2021      Inactive         

 

                    alprazolam 0.5 mg tablet RxNorm: 338935      1 Tablet(s) Ora

l two times a day as 

needed anxiety  2020      Inactive         

 

                    acyclovir 400 mg tablet RxNorm: 232345      TAKE ONE TABLET 

BY MOUTH FOUR TIMES A DAY

                2020      Inactive         

 

                    alprazolam 0.5 mg tablet RxNorm: 266398      1 Tablet(s) Ora

l two times a day as 

needed anxiety  2020      12/15/2020      Inactive         

 

             atorvastatin 20 mg tablet RxNorm: 909494 1 Tablet(s) Oral every day

 2020                Inactive                   

 

             atorvastatin 20 mg tablet RxNorm: 925915 1 Tablet(s) Oral every day

 2020   

02/10/2021                Inactive                   

 

                    alprazolam 0.5 mg tablet RxNorm: 122825      1 Tablet(s) Ora

l two times a day as 

needed anxiety  10/23/2020      11/15/2020      Inactive         

 

                    Depo-Provera 150 mg/mL intramuscular suspension RxNorm: 1000

128     Milliliter(s) 

Intramuscular   10/20/2020      10/20/2020      Inactive         

 

                    acyclovir 400 mg tablet RxNorm: 903377      TAKE ONE TABLET 

BY MOUTH FOUR TIMES A DAY

                10/06/2020      11/15/2020      Inactive         

 

                    alprazolam 0.5 mg tablet RxNorm: 221779      1 Tablet(s) Ora

l two times a day as 

needed anxiety  2020      10/21/2020      Inactive         

 

                    alprazolam 0.5 mg tablet RxNorm: 357892      1 Tablet(s) Ora

l two times a day as 

needed anxiety  2020      Inactive         

 

                escitalopram 5 mg tablet RxNorm: 865930  1 Tablet(s) Oral every 

night at bedtime 

2020          Inactive             

 

             prednisone 20 mg tablet RxNorm: 127680 2 Tablet(s) Oral every day 0

2020                Inactive                   

 

                    Depo-Provera 150 mg/mL intramuscular suspension RxNorm: 1000

128     0.75 

Milliliter(s) Intramuscular 2020      Inactive         

 

                    Kenalog 40 mg/mL suspension for injection RxNorm: 4685775   

  1 Milliliter(s) 

Injection       2020      Inactive         

 

                Zithromax Z-Timur 250 mg tablet RxNorm: 241423  Tablet(s) Oral as 

directed 

2020          Inactive             

 

                    alprazolam 0.5 mg tablet RxNorm: 513091      1 Tablet(s) Ora

l two times a day as 

needed anxiety  2020      Inactive         

 

                    alprazolam 0.5 mg tablet RxNorm: 656288      1 Tablet(s) Ora

l two times a day as 

needed anxiety  2020      Inactive         

 

                    Depo-Provera 150 mg/mL intramuscular suspension RxNorm: 1000

128     Milliliter(s) 

Intramuscular   2020      Inactive         

 

                    acyclovir 400 mg tablet RxNorm: 089971      1 Tablet(s) Oral

 four times a day fever 

blisters        2020      Inactive         

 

                    Depo-Provera 150 mg/mL intramuscular suspension RxNorm: 1000

128     Milliliter(s) 

Intramuscular   2020      Inactive         

 

                pravastatin 10 mg tablet RxNorm: 451169  1 Tablet(s) Oral every 

night at bedtime 

2020          Inactive             

 

                pravastatin 10 mg tablet RxNorm: 148646  1 Tablet(s) Oral every 

night at bedtime 

2020          Inactive             

 

                    Depo-Estradiol 5 mg/mL intramuscular oil RxNorm: 701521     

 3/4 Milliliter(s) 

Intramuscular monthly 2020      Inactive         

 

                    Kenalog 40 mg/mL suspension for injection RxNorm: 3318719   

  Milliliter(s) 

Injection       2019      Inactive         

 

                    Phenergan with Codeine Syrup RxNorm:             5-10 Millil

iter(s) Oral Every 6 hrs as 

needed          2019      Inactive         

 

             prednisone 20 mg tablet RxNorm: 437734 2 Tablet(s) Oral every day 1

2019                Inactive                  start tomorrow

 

                    doxycycline hyclate 100 mg tablet RxNorm: 9645060     1 Tabl

et(s) Oral two times a 

day             2019      Inactive        start if symptom

s do not improve

 

                    Depo-Estradiol 5 mg/mL intramuscular oil RxNorm: 096126     

 3/4 Milliliter(s) 

Intramuscular monthly 2019      Inactive         

 

                    Depo-Estradiol 5 mg/mL intramuscular oil RxNorm: 932683     

 3/4 Milliliter(s) 

Intramuscular monthly 10/31/2019      2019      Inactive         

 

                    gabapentin 100 mg capsule RxNorm: 534759      1 Capsule(s) O

ral every night at 

bedtime         10/16/2019      2019      Inactive         

 

             gabapentin 100 mg capsule RxNorm: 661292 1 Capsule(s) PO QHS 09/12/

2019   

10/11/2019                Inactive                   

 

           naproxen 500 mg tablet RxNorm: 584549 1 Tablet(s) PO BID 2019 0

2019 

Inactive                                 

 

             escitalopram 5 mg tablet RxNorm: 006961 1 Tablet(s) PO QHS 08/15/20

19   2019

                          Inactive                   

 

          CoQ-10 oral RxNorm: 91016 oral      2021           Active     

 

             ranitidine 150 mg capsule RxNorm: 243061 1 Capsule(s) PO BID 2019                Inactive                   

 

             eszopiclone 1 mg tablet RxNorm: 182108 Tablet(s) PO as needed 2020                Inactive                   

 

                acyclovir 400 mg tablet RxNorm: 409118  1 Tablet(s) PO PRN fever

 blisters 

2020          Inactive             

 

             clonidine HCl 0.1 mg tablet RxNorm: 053213 Tablet(s) PO as needed 0

8/15/2019   

2019                Inactive                   







Medication Administered





             Medication   Codes        Instructions Start Date   Status

 

                Kenalog 40 mg/mL suspension for injection RxNorm: 2588219 1Milli

liter     10/25/2021

                                        No longer Active

 

                Depo-Estradiol 5 mg/mL intramuscular oil RxNorm: 514118  0.75Mil

liliter  

2021                              No longer Active

 

                Depo-Estradiol 5 mg/mL intramuscular oil RxNorm: 992919  0.75Mil

liliter  

2021                              No longer Active

 

                Depo-Provera 150 mg/mL intramuscular suspension RxNorm: 7675618 

Milliliter      

2021                              No longer Active

 

                Depo-Provera 150 mg/mL intramuscular suspension RxNorm: 5671281 

Milliliter      

2021                              No longer Active

 

                Depo-Provera 150 mg/mL intramuscular suspension RxNorm: 5771275 

Milliliter      

10/20/2020                              No longer Active

 

                Depo-Provera 150 mg/mL intramuscular suspension RxNorm: 7164294 

0.75Milliliter  

2020                              No longer Active

 

                Kenalog 40 mg/mL suspension for injection RxNorm: 9749234 1Milli

liter     2020

                                        No longer Active

 

                Depo-Provera 150 mg/mL intramuscular suspension RxNorm: 6660133 

Milliliter      

2020                              No longer Active

 

                Depo-Provera 150 mg/mL intramuscular suspension RxNorm: 8892420 

Milliliter      

2020                              No longer Active

 

                Depo-Estradiol 5 mg/mL intramuscular oil RxNorm: 068921  3/4Mill

ilitermonthly 

2020                              Active

 

             Kenalog 40 mg/mL suspension for injection RxNorm: 6839859 Millilite

r   2019   

No longer Active

 

                Depo-Estradiol 5 mg/mL intramuscular oil RxNorm: 394597  3/4Mill

ilitermonthly 

2019                              Active







Immunizations

No Immunization data



Results





          Observation Observation Code Item      Item Code Result    Date      S

[facility] Location

 

          Cbc With Differential Ord2      WBC                 11.77 K/ul 

021 Unknown

 

          Cbc With Differential Ord2      RBC                 4.50 M/ul 20

21 Unknown

 

          Cbc With Differential Ord2      HGB                 13.6 g/dl 20

21 Unknown

 

          Cbc With Differential Ord2      Neut%               69.1 %    20

21 Unknown

 

          Cbc With Differential Ord2      HCT                 43.0 %    20

21 Unknown

 

          Cbc With Differential Ord2      MCV                 95.6 fl   20

21 Unknown

 

          Cbc With Differential Ord2      Lymph%              22.0 %    20

21 Unknown

 

          Cbc With Differential Ord2      MCH                 30.2 pg   20

21 Unknown

 

          Cbc With Differential Ord2      Mono%               6.0 %     20

21 Unknown

 

          Cbc With Differential Ord2      Eos%                2.6 %     20

21 Unknown

 

          Cbc With Differential Ord2      MCHC                31.6 pg   20

21 Unknown

 

          Cbc With Differential Ord2      PLT                 288 K/ul  20

21 Unknown

 

          Cbc With Differential Ord2      Baso%               0.3 %     20

21 Unknown

 

          Cbc With Differential Ord2      RDW                 13.5 %    20

21 Unknown

 

          Cbc With Differential Ord2      Neut ABS#           8.13 K/ul 20

21 Unknown

 

          Cbc With Differential Ord2      Lymph ABS#           2.59 K/ul 

021 Unknown

 

          Cbc With Differential Ord2      Mono ABS#           0.7 K/ul  20

21 Unknown

 

          Cbc With Differential Ord2      Eos ABS#            0.3 K/ul  20

21 Unknown

 

          Cbc With Differential Ord2      Baso ABS#           0.0 K/ul  20

21 Unknown

 

          Comp Metabolic Xhj366    NA                  142 mEq/L 2021 Unkn

own

 

          Comp Metabolic Tdj409    K                   4.4 mEq/L 2021 Unkn

own

 

          Comp Metabolic Zog298    CL                  101 mEq/L 2021 Unkn

own

 

          Comp Metabolic Uzs783    CO2                 32.0 mEq/L 2021 Unk

nown

 

          Comp Metabolic Edr118    ANION GAP           13        2021 Unkn

own

 

          Comp Metabolic Ash141    GLUCOSE             89 mg/dL  2021 Unkn

own

 

          Comp Metabolic Max810    Creat               0.7 mg/dL 2021 Unkn

own

 

          Comp Metabolic Tzn143    eGFR                90 ml/min/1.73m2 20

21 Unknown

 

          Comp Metabolic Xud598    BUN                 19 mg/dL  2021 Unkn

own

 

          Comp Metabolic Qbj794    B/C Ratio           26.4 Ratio 2021 Unk

nown

 

          Comp Metabolic Eby965    CALCIUM             9.2 mg/dL 2021 Unkn

own

 

          Comp Metabolic Pwa644    ALK PHOS            85 U/L    2021 Unkn

own

 

          Comp Metabolic Yjt662    AST(SGOT)           16 U/L    2021 Unkn

own

 

          Comp Metabolic Dqp511    ALT(SGPT)           19 U/L    2021 Unkn

own

 

          Comp Metabolic Haw472    BILI T              0.6 mg/dL 2021 Unkn

own

 

          Comp Metabolic Rby508    ALBUMIN             4.2 g/dL  2021 Unkn

own

 

          Comp Metabolic Ior851    TPRO                7.1 g/dL  2021 Unkn

own

 

          Comp Metabolic Udl056    GLOB                2.9 g/dL  2021 Unkn

own

 

          Comp Metabolic Wod948    A/G Ratio           1.5 Ratio 2021 Unkn

own

 

          Comp Metabolic Hcm405    Osmo                285 mOsmo 2021 Unkn

own

 

          Tsh       Ord6      TSH (3rd IS)           1.14 uIU/mL 2021 Unkn

own

 

          Lipid     Ord30     CHOL                233 mg/dL 2021 Unknown

 

          Lipid     Ord30     HDL                 54.0 mg/dl 2021 Unknown

 

          Lipid     Ord30     TRIG                101 mg/dL 2021 Unknown

 

          Lipid     Ord30     LDL                 159 mg/dL 2021 Unknown

 

          Lipid     Ord30     C/HDL               4.3 Ratio 2021 Unknown

 

          Lipid     Ord30     CHOL                249 mg/dL 10/22/2020 Unknown

 

          Lipid     Ord30     HDL                 55.0 mg/dl 10/22/2020 Unknown

 

          Lipid     Ord30     TRIG                82 mg/dL  10/22/2020 Unknown

 

          Lipid     Ord30     LDL                 178 mg/dL 10/22/2020 Unknown

 

          Lipid     Ord30     C/HDL               4.5 Ratio 10/22/2020 Unknown

 

          Comp Metabolic Lvc813    NA                  138 mEq/L 10/22/2020 Unkn

own

 

          Comp Metabolic Nms913    K                   4.5 mEq/L 10/22/2020 Unkn

own

 

          Comp Metabolic Jvp772    CL                  103 mEq/L 10/22/2020 Unkn

own

 

          Comp Metabolic Bfa003    CO2                 28.0 mEq/L 10/22/2020 Unk

nown

 

          Comp Metabolic Ygm983    ANION GAP           12        10/22/2020 Unkn

own

 

          Comp Metabolic Agb280    GLUCOSE             90 mg/dL  10/22/2020 Unkn

own

 

          Comp Metabolic Bpy932    Creat               0.7 mg/dL 10/22/2020 Unkn

own

 

          Comp Metabolic Wpq015    eGFR                88 ml/min/1.73m2 10/22/20

20 Unknown

 

          Comp Metabolic Roh854    BUN                 20 mg/dL  10/22/2020 Unkn

own

 

          Comp Metabolic Psc092    B/C Ratio           27.0 Ratio 10/22/2020 Unk

nown

 

          Comp Metabolic Mze281    CALCIUM             9.5 mg/dL 10/22/2020 Unkn

own

 

          Comp Metabolic Qyv124    ALK PHOS            87 U/L    10/22/2020 Unkn

own

 

          Comp Metabolic Moe492    AST(SGOT)           17 U/L    10/22/2020 Unkn

own

 

          Comp Metabolic Raa478    ALT(SGPT)           20 U/L    10/22/2020 Unkn

own

 

          Comp Metabolic Tyd502    BILI T              0.6 mg/dL 10/22/2020 Unkn

own

 

          Comp Metabolic Mwx201    ALBUMIN             4.5 g/dL  10/22/2020 Unkn

own

 

          Comp Metabolic Acz489    TPRO                7.6 g/dL  10/22/2020 Unkn

own

 

          Comp Metabolic Mhk993    GLOB                3.1 g/dL  10/22/2020 Unkn

own

 

          Comp Metabolic Kxf867    A/G Ratio           1.5 Ratio 10/22/2020 Unkn

own

 

          Comp Metabolic Wng296    Osmo                278 mOsmo 10/22/2020 Unkn

own

 

          Cbc With Differential Ord2      WBC                 10.56 K/ul 10/22/2

020 Unknown

 

          Cbc With Differential Ord2      RBC                 4.44 M/ul 10/22/20

20 Unknown

 

          Cbc With Differential Ord2      HGB                 14.0 g/dl 10/22/20

20 Unknown

 

          Cbc With Differential Ord2      HCT                 42.8 %    10/22/20

20 Unknown

 

          Cbc With Differential Ord2      Neut%               73.9 %    10/22/20

20 Unknown

 

          Cbc With Differential Ord2      MCV                 96.4 fl   10/22/20

20 Unknown

 

          Cbc With Differential Ord2      Lymph%              18.2 %    10/22/20

20 Unknown

 

          Cbc With Differential Ord2      MCH                 31.5 pg   10/22/20

20 Unknown

 

          Cbc With Differential Ord2      Mono%               5.4 %     10/22/20

20 Unknown

 

          Cbc With Differential Ord2      MCHC                32.7 pg   10/22/20

20 Unknown

 

          Cbc With Differential Ord2      Eos%                2.2 %     10/22/20

20 Unknown

 

          Cbc With Differential Ord2      PLT                 258 K/ul  10/22/20

20 Unknown

 

          Cbc With Differential Ord2      Baso%               0.3 %     10/22/20

20 Unknown

 

          Cbc With Differential Ord2      RDW                 13.4 %    10/22/20

20 Unknown

 

          Cbc With Differential Ord2      Neut ABS#           7.81 K/ul 10/22/20

20 Unknown

 

          Cbc With Differential Ord2      Lymph ABS#           1.92 K/ul 10/22/2

020 Unknown

 

          Cbc With Differential Ord2      Mono ABS#           0.6 K/ul  10/22/20

20 Unknown

 

          Cbc With Differential Ord2      Eos ABS#            0.2 K/ul  10/22/20

20 Unknown

 

          Cbc With Differential Ord2      Baso ABS#           0.0 K/ul  10/22/20

20 Unknown

 

          Tsh       Ord6      TSH (3rd IS)           1.38 uIU/mL 2020 Un[facility]

own

 

          Comp Metabolic Apw960    NA                  141 mEq/L 2020 Un[facility]

own

 

          Comp Metabolic Pzf713    K                   4.3 mEq/L 2020 Unkn

own

 

          Comp Metabolic Sbq758    CL                  104 mEq/L 2020 Un[facility]

own

 

          Comp Metabolic Rdb904    CO2                 29.0 mEq/L 2020 Unk

nown

 

          Comp Metabolic Nho404    ANION GAP           12        2020 Unkn

own

 

          Comp Metabolic Lms765    GLUCOSE             83 mg/dL  2020 Un[facility]

own

 

          Comp Metabolic Ycn792    Creat               0.7 mg/dL 2020 Un[facility]

own

 

          Comp Metabolic Tov502    eGFR                102 ml/min/1.73m2 

020 Unknown

 

          Comp Metabolic Znv072    BUN                 16 mg/dL  2020 Un[facility]

own

 

          Comp Metabolic Omw576    B/C Ratio           24.6 Ratio 2020 Unk

nown

 

          Comp Metabolic Hbn665    CALCIUM             9.3 mg/dL 2020 Un[facility]

own

 

          Comp Metabolic Zxv032    ALK PHOS            79 U/L    2020 Un[facility]

own

 

          Comp Metabolic Cep407    AST(SGOT)           19 U/L    2020 Unkn

own

 

          Comp Metabolic Hqz106    ALT(SGPT)           26 U/L    2020 Un[facility]

own

 

          Comp Metabolic Vhd508    BILI T              0.5 mg/dL 2020 Un[facility]

own

 

          Comp Metabolic Lzb247    ALBUMIN             4.2 g/dL  2020 Un[facility]

own

 

          Comp Metabolic Hhs345    TPRO                7.0 g/dL  2020 Un[facility]

own

 

          Comp Metabolic Mvg389    GLOB                2.8 g/dL  2020 Unkn

own

 

          Comp Metabolic Kyd594    A/G Ratio           1.5 Ratio 2020 Unkn

own

 

          Comp Metabolic Unn433    Osmo                282 mOsmo 2020 Unkn

own

 

          Cbc With Differential Ord2      WBC                 8.94 K/ul 20

20 Unknown

 

          Cbc With Differential Ord2      RBC                 4.34 M/ul 20

20 Unknown

 

          Cbc With Differential Ord2      HGB                 13.3 g/dl 20

20 Unknown

 

          Cbc With Differential Ord2      HCT                 40.7 %    20

20 Unknown

 

          Cbc With Differential Ord2      Neut%               71.8 %    20

20 Unknown

 

          Cbc With Differential Ord2      MCV                 93.8 fl   20

20 Unknown

 

          Cbc With Differential Ord2      Lymph%              19.5 %    20

20 Unknown

 

          Cbc With Differential Ord2      MCH                 30.6 pg   20

20 Unknown

 

          Cbc With Differential Ord2      Mono%               6.2 %     20

20 Unknown

 

          Cbc With Differential Ord2      MCHC                32.7 pg   20

20 Unknown

 

          Cbc With Differential Ord2      Eos%                2.2 %     20

20 Unknown

 

          Cbc With Differential Ord2      Baso%               0.3 %     20

20 Unknown

 

          Cbc With Differential Ord2      PLT                 262 K/ul  20

20 Unknown

 

          Cbc With Differential Ord2      RDW                 13.4 %    20

20 Unknown

 

          Cbc With Differential Ord2      Neut ABS#           6.42 K/ul 20

20 Unknown

 

          Cbc With Differential Ord2      Lymph ABS#           1.74 K/ul 

020 Unknown

 

          Cbc With Differential Ord2      Mono ABS#           0.6 K/ul  20

20 Unknown

 

          Cbc With Differential Ord2      Eos ABS#            0.2 K/ul  20

20 Unknown

 

          Cbc With Differential Ord2      Baso ABS#           0.0 K/ul  20

20 Unknown

 

          Lipid     Ord30     CHOL                240 mg/dL 2020 Unknown

 

          Lipid     Ord30     HDL                 57.0 mg/dl 2020 Unknown

 

          Lipid     Ord30     TRIG                80 mg/dL  2020 Unknown

 

          Lipid     Ord30     LDL                 167 mg/dL 2020 Unknown

 

          Lipid     Ord30     C/HDL               4.2 Ratio 2020 Unknown

 

          C A/B FLU 7503007   Influenza A Scr           Negative  2019 Unk

nown

 

          C A/B FLU 0800999   Influenza B Scr           Negative  2019 Unk

nown

 

           C A/B FLU  6463651    Influenza Intrp B AG:PRID:PT:NOSE:NOM:IF       

     See Footnote  

2019                              Unknown







Procedures





                    Procedure           Codes               Date

 

                    THER/PROPH/DIAG INJ SC/IM CPT-4: 69118        2021

 

                    THER/PROPH/DIAG INJ SC/IM CPT-4: 49728        2021

 

                    THER/PROPH/DIAG INJ SC/IM CPT-4: 31935        2021

 

                    THER/PROPH/DIAG INJ SC/IM CPT-4: 10521        2021

 

                    THER/PROPH/DIAG INJ SC/IM CPT-4: 07003        10/20/2020

 

                    THER/PROPH/DIAG INJ SC/IM CPT-4: 74510        2020

 

                    TRIAMCINOLONE ACET INJ NOS 10 mg CPT-4:         

020

 

                    THER/PROPH/DIAG INJ SC/IM CPT-4: 74094        2020

 

                    THER/PROPH/DIAG INJ SC/IM CPT-4: 24548        2020

 

                    THER/PROPH/DIAG INJ SC/IM CPT-4: 43638        2020

 

                    TRIAMCINOLONE ACET INJ NOS 10 mg CPT-4:         

019

 

                    THER/PROPH/DIAG INJ SC/IM CPT-4: 47136        2019







Vital Signs





                          Date                      Vital

 

                2021      Blood Pressure 1: 110/80 Code: 8480-6 BMI: 30.2 

Code: 91165-9 Heart 

Rate 1: 88 bpm      Height: 5'2" Code: 8302-2 SpO2: 98%           Temperature: 3

6.8 (C) / 98.2 

(F)                                     Weight: 165 lbs  Code: 70510-6

 

                2021      Blood Pressure 1: 156/82 Code: 8480-6 Heart Rate

 1: 81 bpm Height: 

5'2" Code: 8302-2   Height: 5'2" Code: 8302-2 SpO2: 99%           Temperature: 3

6.2 (C) / 

97.1 (F)                                Weight:  Code: 09603-9

 

                10/25/2021      Blood Pressure 1: 142/68 Code: 8480-6 BMI: 30.7 

Code: 10380-7 Heart 

Rate 1: 63 bpm      Height: 5'2" Code: 8302-2 SpO2: 97%           Temperature: 3

6.4 (C) / 97.6 

(F)                                     Weight: 168 lbs  Code: 67192-7

 

                2020      Blood Pressure 1: 130/80 Code: 8480-6 BMI: 28.5 

Code: 89524-9 Heart 

Rate 1: 69 bpm      Height: 5'2" Code: 8302-2 SpO2: 97%           Temperature: 3

6.9 (C) / 98.4 

(F)                                     Weight: 156 lbs  Code: 57126-7

 

                2020      Blood Pressure 1: 128/78 Code: 8480-6 Heart Rate

 1: 74 bpm Height:  

Code: 8302-2              SpO2: 98%                 Weight:  Code: 53497-0

 

                    2020          Height:  Code: 8302-2 Weight:  Code: 294

63-7

 

                2020      Blood Pressure 1: 132/80 Code: 8480-6 BMI: 28.9 

Code: 53627-4 Heart 

Rate 1: 68 bpm      Height: 5'2" Code: 8302-2 SpO2: 97%           Weight: 158 lb

s  Code: 

30668-6

 

                2019      Blood Pressure 1: 110/60 Code: 8480-6 BMI: 28.5 

Code: 82437-2 Heart 

Rate 1: 76 bpm      Height: 5'2" Code: 8302-2 SpO2: 98%           Temperature: 3

6.9 (C) / 98.5 

(F)                                     Weight: 156 lbs  Code: 67160-0

 

             2019   Blood Pressure 1: 124/80 Code: 8480-6 Heart Rate 1: 81

 bpm SpO2: 98%    

Temperature: 36.7 (C) / 98.1 (F)

 

                2019      Blood Pressure 1: 130/78 Code: 8480-6 BMI: 28.0 

Code: 88065-7 Heart 

Rate 1: 68 bpm      Height: 5'2" Code: 8302-2 SpO2: 98%           Weight: 153 lb

s  Code: 

11655-1

 

                2019      Heart Rate 1: 68 bpm Height:  Code: 8302-2 Weigh

t:  Code: 65542-6

 

                2019      Blood Pressure 1: 132/74 Code: 8480-6 BMI: 27.4 

Code: 44801-7 Heart 

Rate 1: 66 bpm      Height: 5'2" Code: 8302-2 SpO2: 98%           Weight: 150 lb

s  Code: 

69380-2

 

                08/15/2019      Blood Pressure 1: 104/60 Code: 8480-6 BMI: 27.5 

Code: 59995-5 Heart 

Rate 1: 66 bpm      Height: 5'2" Code: 8302-2 SpO2: 98%           Weight: 150 lb

s 5 oz Code: 

71429-4







Functional Status

No Functional Status data



Reason For Visit





                    Reason For Visit    Effective Dates     Notes

 

                    cough               2021           

 

                    cough               2021           

 

                    sinus congestion    10/25/2021           

 

                    headache            2020           

 

                    headache            2020           

 

                    well woman exam (40-65 years) 2020           

 

                    cough               2019           

 

                    fever               2019           

 

                    menopausal symptoms 2019           

 

                    lower leg pain      2019           

 

                    lower leg pain      2019           

 

                    anxiety             08/15/2019           







Encounters





             Encounter    Performer    Location     Codes        Date

 

                                        () 37577 EST. PATIENT, LEVEL IV

Diagnosis: Chronic cough[ICD10: R05.3]

Diagnosis: Laryngitis[ICD10: J04.0]

Diagnosis: Other allergic rhinitis[ICD10: J30.89]

Diagnosis: Dysphagia[ICD10: r13.10] Eulalia Crump MD, [facility]

PT-4: 

52003                                   2021

 

                                        [id number]) 31904 EST. PATIENT, LEVEL IV

Diagnosis: Acute bronchitis[ICD10: J20.9]

Diagnosis: Laryngitis[ICD10: J04.0]

Diagnosis: Other allergic rhinitis[ICD10: J30.89]

Diagnosis: Slow transit constipation[ICD10: K59.01] [facility]                   CPT-4: 35798              2021

 

                                        [phone]) 71428 EST. PATIENT, LEVEL IV

Diagnosis: Generalized anxiety disorder[ICD10: F41.9]

Diagnosis: Cough[ICD10: R05.9]

Diagnosis: Other allergic rhinitis[ICD10: J30.89]

Diagnosis: Encounter for screening mammogram for malignant neoplasm of 
breast[ICD10: Z12.31] Eulalia Crump MD, [facility] CPT-4: 66295    

10/25/2021

 

                                        [id number]) 66507 EST. PATIENT, LEVEL III

Diagnosis: Headache[ICD10: R51]

Diagnosis: Other allergic rhinitis[ICD10: J30.89] Bree Gutierres MD, [facility]          CPT-4: 94442              2020

 

                                        99755 EST. PATIENT, LEVEL III

Diagnosis: Other acute sinusitis[ICD10: J01.80]

Diagnosis: Other allergic rhinitis[ICD10: J30.89]

Diagnosis: Migraine without status migrainosus, not intractable, unspecified 
migraine type[ICD10: G43.909]

Diagnosis: Menopausal and female climacteric states[ICD10: N95.1] [facility]    CPT-4: 27259              2020

 

                                        [id number]) 90389 EST. PATIENT, LEVEL III

Diagnosis: Generalized anxiety disorder[ICD10: F41.1] [facility]          CPT-4: 79040              2020

 

                                        (94712) PREV VISIT EST AGE 40-64

Diagnosis: Encounter for gynecological examination (general) (routine) without 
abnormal findings[ICD10: Z01.419] Bree Crump MD, [facility] CPT

-4:

42652                                   2020

 

                                        [phone]) 40239 EST. PATIENT, LEVEL III

Diagnosis: Cough[ICD10: R05]

Diagnosis: Acute bronchitis[ICD10: J20.9] Bree carrasco MD, 

[facility]                       CPT-4: 38192              2019

 

                                        [id number]) 05979 EST. PATIENT, LEVEL III

Diagnosis: Fever[ICD10: R50.9]

Diagnosis: Body aches[ICD10: R52] Bree Crump MD, [facility] CPT

-4:

34470                                   2019

 

                                        [phone]) 49092 EST. PATIENT, LEVEL III

Diagnosis: Menopausal and female climacteric states[ICD10: N95.1] Bree Crump MD, [facility] CPT-4: 47968        2019

 

                                        25592 EST. PATIENT, LEVEL III

Diagnosis: Pain in left ankle and joints of left foot[ICD10: M25.572]

Diagnosis: Pain in left knee[ICD10: M25.562] Flakita jackson MD, [facility]

                          CPT-4: 02459              2019

 

                                        66986 EST. PATIENT, LEVEL III

Diagnosis: Pain in left ankle and joints of left foot[ICD10: M25.572]

Diagnosis: Pain in left knee[ICD10: M25.562] Flakita jackson MD, [facility]

                          CPT-4: 12998              2019

 

                                        (81200) PREV VISIT NEW AGE 40-64

Diagnosis: Encounter for general adult medical examination without abnormal 
findings[ICD10: Z00.00] [facility] CPT-4: 41135    

08/15/2019







Plan of Care





             Planned Activity Notes        Codes        Status       Date

 

                          Visit Plan:               Chronic cough/laryngitis - h

as been persistent for the last 6 

weeks, despite treatment of allergies, GERD, and URI with steroids and 
antibiotic. Will obtain CXR today and rx for Albuterol inhaler sent to pharmacy 
to see if improvement with use. Dysphagia - pt reports feeling as though food is
getting caught in her esophagus. Pt was being treated for possible silent GERD 
as has had a persistent cough despite improvement in sinus congestion and 
drainage. Per pt request will refer for EGD with Dr. Wayne. Pt reports family 
history of esophageal stricture. Allergies - pt reports sinus congestion and dra monae has significantly improved since taking Allegra daily.

                                                            2021

 

                                        Appointment: Eulalia Osman 

WPtel:[phone]

                                        [address] [id number]

[place]                                              (30 min) Complex 2021

 

             Patient Education: Patient Medication Summary                      

     Completed    2021

 

                    Care Plan: CHEST X-RAY 2VW FRONTAL&LATL                     

LOINC : 14602-0

                          Pending                   2021

 

                          Visit Plan:               Allergies - recent increase 

in nasal congestion that has been 

worsening cough. Has tried Claritin in the past without any relief. Discussed 
use of being aggressive with allergies to help decrease cough and loss of voice.
Discussed use of Allegra twice a day x 5 days then decrease down to daily. BP 
elevated the last 2 visits, so discouraged use of Sudafed. Discussed us of 
Cloricidin HP as needed for congestions. Laryngitis - secondary to acute 
bronchitis that has worsened over the last month. Cough has improved, but pt 
reports tightness when lying down. Discussed possible silent GERD due to increas
e in coughing. Recommended taking Omeprazole daily x 2 weeks. Acute Bronchitis -
cough improved, denies chest congestion. Completed 2 rounds of antibiotics and 2
rounds of steroids. Discussed treat more aggressive with allergies and GERD. Pt 
encouraged to follow up in 2 weeks if no improvement. Slow transit constipation 
- encouraged use Miralax 1 capful daily, may titrate based on needed. Allergies 
exacerbation with cough - chronic - recommended pt to use allergy medication as 
prescribed. Pt has been counseled as to the appropriate use of the medication. 
Pt to call if allergy symptoms are not controlled with the medication. If using 
nasal spray, instructions as follows: Nasal spray- use twice daily, one spray 
per nostril twice daily, after 30 minutes, rinse out nose with saline spray.. 
Use opposite hand per nostril to spray in the nasal steroid allergy spray. 
Kenalog injection given at visit. Prednisone rx sent. Phenergan with codeine 
cough medicine sent. Screening mammogram - orders sent. Anxiety - controlled. 
Continue prn alprazolam at bedtime and escitalopram. Fasting lab orders sent

                                                            2021

 

                                        Appointment: Eulalia Osman 

WPtel:[phone]

                                        [address] [id number]

                                              (30 min) Complex 2021

 

             Patient Education: Patient Medication Summary                      

     Completed    2021

 

             Patient Education: Patient Medication Summary                      

     Completed    2021

 

                          Visit Plan:               Allergies exacerbation with 

cough - chronic - recommended pt to use

allergy medication as prescribed. Pt has been counseled as to the appropriate 
use of the medication. Pt to call if allergy symptoms are not controlled with 
the medication. If using nasal spray, instructions as follows: Nasal spray- use 
twice daily, one spray per nostril twice daily, after 30 minutes, rinse out nose
with saline spray.. Use opposite hand per nostril to spray in the nasal steroid 
allergy spray. Kenalog injection given at visit. Prednisone rx sent. Phenergan 
with codeine cough medicine sent. Screening mammogram - orders sent. Anxiety - 
controlled. Continue prn alprazolam at bedtime and escitalopram. Fasting lab 
orders sent

                                                            10/25/2021

 

                                        Appointment: Eulalia Osman 

WPtel:[phone]

                                        [address] [id number]

                                              (30 min) Complex 10/25/2021

 

             Patient Education: Patient Medication Summary                      

     Completed    10/25/2021

 

             Patient Education: prednisone- [employer] [id number]         

                  Completed    

10/25/2021

 

             Appointment:                            Injection    2021

 

             Patient Education: Patient Medication Summary                      

     Completed    2021

 

             Appointment:                            Injection    2021

 

             Patient Education: Patient Medication Summary                      

     Completed    2021

 

             Appointment:                            Injection    2021

 

             Appointment:                            Injection    2021

 

             Patient Education: Patient Medication Summary                      

     Completed    2021

 

             Appointment:                            Injection    2021

 

             Patient Education: Patient Medication Summary                      

     Completed    2021

 

             Patient Education: Patient Medication Summary                      

     Completed    11/10/2020

 

             Patient Education: Patient Medication Summary                      

     Completed    10/22/2020

 

             Appointment:                            Injection    10/20/2020

 

             Patient Education: Patient Medication Summary                      

     Completed    10/20/2020

 

                          Visit Plan:               Allergies - chronic - recomm

ended pt to use allergy medication as 

prescribed. Pt has been counseled as to the appropriate use of the medication. 
Pt to call if allergy symptoms are not controlled with the medication. If using 
nasal spray, instructions as follows: Nasal spray- use twice daily, one spray 
per nostril twice daily, after 30 minutes, rinse out nose with saline spray.. 
Use opposite hand per nostril to spray in the nasal steroid allergy spray. 
Headache -will treat allergies/congestion - instructed patient to call if does 
not improve and we will order CT Head without contrast.

                                                            2020

 

                                        Appointment: Bree Oleary 

WPtel:[phone]

                                        [id number] [place] [address]                                              (15 min) Moderate 2020

 

             Patient Education: Patient Medication Summary                      

     Completed    2020

 

                          Visit Plan:               Sinusitis - Pt has acute inf

ection - pain in face, maxillary 

region, Pt informed to use decongestant, RX given to patient, sinus rinses also 
recommended. Call if symptoms do not show improvement. Allergies - chronic - 
recommended pt to use allergy medication as prescribed. Pt has been counseled as
to the appropriate use of the medication. Pt to call if allergy symptoms are not
controlled with the medication. If using nasal spray, instructions as follows: 
Nasal spray- use twice daily, one spray per nostril twice daily, after 30 
minutes, rinse out nose with saline spray.. Use opposite hand per nostril to 
spray in the nasal steroid allergy spray. Migraine - will treat for sinus 
infection - pt is to notify clinic if symptoms do not improve, if they worsen, 
or with any changes, questions, or concerns.

                                                            2020

 

                                        Appointment: Flakita Castro 

WPtel:[phone]

                                        [facility]                                              (30 min) Complex 2020

 

             Patient Education: Patient Medication Summary                      

     Completed    2020

 

                          Visit Plan:               Anxiety - the patient has un

controlled anxiety and will benefit 

from a short term dose of xanax for control of symptoms.

                                                            2020

 

                                        Appointment: Yesica Crump 

WPtel:[phone]

                                        [facility]                                              TeleHealth      2020

 

             Patient Education: Patient Medication Summary                      

     Completed    2020

 

             Appointment:                            Injection    2020

 

             Patient Education: Patient Medication Summary                      

     Completed    2020

 

             Appointment:                            Injection    2020

 

             Appointment:                            Injection    2020

 

             Patient Education: Patient Medication Summary                      

     Completed    2020

 

                                        Appointment: Yesica Crump 

WPtel:[phone]

                                        [facility]

                                              Well Woman      03/10/2020

 

                          Visit Plan:               Well Adult Female - exam com

pleted. Pap and breast exam completed. 

Pt will be called with results of her testing. She was advised to continue with 
yearly annual exams. Safe sex practices discussed during office visit today. 
Call if any abnormal gynecologic issues during the next year, otherwise, RTC 
yearly or prn.

                                                            2020

 

                                        Appointment: Bree Oleary 

WPtel:[phone]

                                        [facility]

                                              Well Woman      2020

 

             Patient Education: Patient Medication Summary                      

     Completed    2020

 

                                        Appointment: Yesica Crump 

WPtel:[phone]

                                        [address] [place], [place] [id number]                                              (15 min) Moderate 2020

 

             Appointment:                            Injection    2020

 

             Patient Education: Patient Medication Summary                      

     Completed    2020

 

                          Visit Plan:               Bronchitis - acute case of b

ronchitis identified. Pt has been given

antibiotics, breathing treatments as appropriate, and pt has been instructed to 
call if symptoms are not improved, or if symptoms acutely worsen.

                                                            2019

 

                                        Appointment: Bree Oleary 

WPtel:[phone]

                                        [address] [place], [place] [id number]

                                              (15 min) Moderate 2019

 

             Patient Education: Patient Medication Summary                      

     Completed    2019

 

                          Visit Plan:               URI -viral -flu swab to r/o 

flu - Pt advised to increase fluids, 

vitamin C. Discussed natural and expected course of this diagnosis and need to 
alert me if symptoms do not follow expected course, or if any worse.

                                                            2019

 

                                        Appointment: Bree Oleary 

WPtel:[phone]

                                        [facility]

                                              (30 min) Complex 2019

 

             Patient Education: Patient Medication Summary                      

     Completed    2019

 

                          Visit Plan:               Post-surgical menopause - co

ntinue to taper depo dose and 

eventually off medication completely -injection today in the office.

                                                            2019

 

                                        Appointment: Bree Oleary 

WPtel:[phone]

                                        [facility]

[place]                                              (15 min) Moderate 2019

 

             Patient Education: Patient Medication Summary                      

     Completed    2019

 

                                        Appointment: Bree Oleary 

WPtel:[phone]

                                        [facility]

[place]                                              (30 min) Complex 2019

 

                          Visit Plan:               Left knee, ankle, foot pain 

- will send RX - will refer to ortho - 

The pt is to use prn antiinflammatories to manage acute pain. The patient is to 
call the office if the pain is worsening or does not improve.

                                                            2019

 

                                        Appointment: Flakita Castro 

WPtel:[phone]

                                        [facility]

[place]                                              (30 min) Complex 2019

 

             Patient Education: Patient Medication Summary                      

     Completed    2019

 

                    Care Plan: Referral Order                     SNOMED-CT : 30

[id number]

                          Pending                   2019

 

                          Visit Plan:               Left knee, ankle, foot pain 

- will send RX - if no improvement will

refer to ortho - The pt is to use prn antiinflammatories to manage acute pain. 
The patient is to call the office if the pain is worsening or does not improve.

                                                            2019

 

                                        Appointment: Flakita Castro 

WPtel:[phone]

                                        [facility]

                                              (30 min) Complex 2019

 

             Patient Education: Patient Medication Summary                      

     Completed    2019

 

                          Visit Plan:               Well Adult - pt was counsele

d about diet, exercise, and encouraged 

to follow a heart healthy diet and increase activity level. The patient was 
instructed to RTC yearly for well adult exams and PRN for acute illnesses. The 
pt was also instructed to have yearly labs for check of cholesterol, thyroid, 
chem panel, CBC, and renal functioning. Post-surgical menopause - I have advised
the pt that we will look for her medications from [facility] to find out what her depo 
dose is so I can wean her off of it. Anxiety - the patient has uncontrolled 
anxiety and will benefit from an SSRI on a daily basis to attempt control of the
symptoms of anxiety (tachycardia, overwhelming sensations, stress, insomnia, 
etc). stop clonidine due to hypotension

                                                            08/15/2019

 

             Patient Education: Patient Medication Summary                      

     Completed    08/15/2019

 

                                        [facility] 

[phone]

                                        [address] [facility]

[place]              Referral                        Appointment Requested  

 

             Referral: Álvaro Sellers  Referral                  Appointment Co

nfirmed  

 

             Referral: Álvaro Sellers  Referral                  Completed     







Instructions





                          Comment                   Date

 

                                        CHEST XRAY TODAY

ALBUTEROL INHALER 2 PUFFS EVERY 4-6 HOURS AS NEEDED

WILL SEND REFERRAL TO DR WAYNE FOR EGD

WILL DISCUSS NEXT FOLLOW UP AFTER CHEST XRAY AND EFFECTIVENESS OF INHALER. CALL 
WITH UPDATE IN 1 WEEK.

                                        . Chronic cough/laryngitis - has been pe

rsistent for the last 6 weeks, despite 

treatment of allergies, GERD, and URI with steroids and antibiotic. Will obtain 
CXR today and rx for Albuterol inhaler sent to pharmacy to see if improvement 
with use. 



Dysphagia - pt reports feeling as though food is getting caught in her 
esophagus. Pt was being treated for possible silent GERD as has had a persistent
cough despite improvement in sinus congestion and drainage. Per pt request will 
refer for EGD with Dr. Wayne. Pt reports family history of esophageal 
stricture. 



Allergies - pt reports sinus congestion and drainage has significantly improved 
since taking Allegra daily.             2021

 

                                        GENERIC ALLEGRA OTC 1 TABLET TWICE A DAY

 FOR 5 DAYS, THEN DECREASE TO ONCE A DAY



OMEPRAZOLE (GENERIC PRILOSEC) 20MG TAKE 1 DAILY X 14



CLORICIDIN HP OVER THE COUNTER DECONGESTANT AS NEEDED WHEN YOU FEEL MORE 
CONGESTED



MIRALAX 17GM 1 CAPFUL MIX WITH WATER DAILY

DUE FOR ANNUAL MAMMOGRAM- order faxed



DUE TO ANNUAL FASTING LABS- orders given at visit



CONTINUE XYZOL OR ZYRTEC



STEROID SHOT TODAY



PREDNISONE TABLETS TAKE 2 TABLETS DAILY X 5 DAYS



PHENERGAN W/CODEINE COUGH MEDICINE

                                        . Allergies - recent increase in nasal c

ongestion that has been worsening cough.

Has tried Claritin in the past without any relief. Discussed use of being 
aggressive with allergies to help decrease cough and loss of voice. Discussed 
use of Allegra twice a day x 5 days then decrease down to daily. BP elevated the
last 2 visits, so discouraged use of Sudafed. Discussed us of Cloricidin HP as 
needed for congestions. 



Laryngitis - secondary to acute bronchitis that has worsened over the last 
month. Cough has improved, but pt reports tightness when lying down. Discussed 
possible silent GERD due to increase in coughing. Recommended taking Omeprazole 
daily x 2 weeks. 



Acute Bronchitis - cough improved, denies chest congestion. Completed 2 rounds 
of antibiotics and 2 rounds of steroids. Discussed treat more aggressive with 
allergies and GERD. Pt encouraged to follow up in 2 weeks if no improvement. 



Slow transit constipation - encouraged use Miralax 1 capful daily, may titrate 
based on needed. 

Allergies exacerbation with cough - chronic - recommended pt to use allergy 
medication as prescribed.  Pt has been counseled as  to the appropriate use of 
the medication.  Pt to call if allergy symptoms are not controlled with the 
medication.

If using nasal spray, instructions as follows: Nasal spray- use twice daily, one
spray per nostril twice daily, after 30 minutes, rinse out nose with saline 
spray.. Use opposite hand per nostril to spray in the nasal steroid allergy 
spray.  Kenalog injection given at visit. Prednisone rx sent. Phenergan with 
codeine cough medicine sent. 



Screening mammogram - orders sent.



Anxiety - controlled. Continue prn alprazolam at bedtime and escitalopram. 



Fasting lab orders sent







                                        2021

 

                                        DUE FOR ANNUAL MAMMOGRAM- order faxed



DUE TO ANNUAL FASTING LABS- orders given at visit



CONTINUE XYZOL OR ZYRTEC



STEROID SHOT TODAY



PREDNISONE TABLETS TAKE 2 TABLETS DAILY X 5 DAYS



PHENERGAN W/CODEINE COUGH MEDICINE

                                        . Allergies exacerbation with cough - ch

ronic - recommended pt to use allergy 

medication as prescribed.  Pt has been counseled as  to the appropriate use of 
the medication.  Pt to call if allergy symptoms are not controlled with the 
medication.

If using nasal spray, instructions as follows: Nasal spray- use twice daily, one
spray per nostril twice daily, after 30 minutes, rinse out nose with saline 
spray.. Use opposite hand per nostril to spray in the nasal steroid allergy 
spray.  Kenalog injection given at visit. Prednisone rx sent. Phenergan with 
codeine cough medicine sent. 



Screening mammogram - orders sent.



Anxiety - controlled. Continue prn alprazolam at bedtime and escitalopram. 



Fasting lab orders sent





                                        10/25/2021

 

                                        . Allergies - chronic - recommended pt t

o use allergy medication as prescribed. 

Pt has been counseled as  to the appropriate use of the medication.  Pt to call 
if allergy symptoms are not controlled with the medication.

If using nasal spray, instructions as follows: Nasal spray- use twice daily, one
spray per nostril twice daily, after 30 minutes, rinse out nose with saline 
spray.. Use opposite hand per nostril to spray in the nasal steroid allergy 
spray.



Headache -will treat allergies/congestion - instructed patient to call if does 
not improve and we will order CT Head without contrast. 

                                        2020

 

                                        . Sinusitis - Pt has acute infection - p

ain in face, maxillary region, Pt 

informed to use decongestant, RX given to patient, sinus rinses also 
recommended.  Call if symptoms do not show improvement.



Allergies - chronic - recommended pt to use allergy medication as prescribed.  
Pt has been counseled as  to the appropriate use of the medication.  Pt to call 
if allergy symptoms are not controlled with the medication.

If using nasal spray, instructions as follows: Nasal spray- use twice daily, one
spray per nostril twice daily, after 30 minutes, rinse out nose with saline 
spray.. Use opposite hand per nostril to spray in the nasal steroid allergy 
spray.



Migraine - will treat for sinus infection - pt is to notify clinic if symptoms 
do not improve, if they worsen, or with any changes, questions, or concerns.  

2020

 

                                        . Anxiety - the patient has uncontrolled

 anxiety and will benefit from a short 

term dose of xanax for control of symptoms. 2020

 

                                        . Well Adult Female - exam completed.  P

ap and  breast exam completed.  Pt will 

be called with results of her testing.  She was advised to continue with yearly 
annual exams.   Safe sex practices discussed during office visit today.  Call if
any abnormal gynecologic issues during the next year, otherwise, RTC yearly or 
prn.

                                        2020

 

                                        kenalog 

                                        . Bronchitis - acute case of bronchitis 

identified.  Pt has been given 

antibiotics, breathing treatments as appropriate, and pt has been instructed to 
call if symptoms are not improved, or if symptoms acutely worsen.

                                        2019

 

                                        . URI -viral -flu swab to r/o flu - Pt a

dvised to increase fluids, vitamin C.  

Discussed natural and expected course of this diagnosis and need to alert me if 
symptoms do not follow expected course, or if any worse.

                                        2019

 

                                        . Post-surgical menopause - continue to 

taper depo dose and eventually off 

medication completely -injection today in the office. 

                                        2019

 

                                        tomorrow at 10:15 with the nurse elbert benavides. Left knee, ankle, foot pain - 

will send RX - will refer to ortho - The pt is to use prn antiinflammatories to 
manage acute pain. The patient is to call the office if the pain is worsening or
does not improve. 



                                        2019

 

                                        . Left knee, ankle, foot pain - will sen

d RX - if no improvement will refer to 

ortho - The pt is to use prn antiinflammatories to manage acute pain. The 
patient is to call the office if the pain is worsening or does not improve. 

                                        2019

 

                                        . Well Adult - pt was counseled about di

et, exercise, and encouraged to follow a

heart healthy diet and increase activity level.  The patient was instructed to 
RTC yearly for well adult exams and PRN for acute illnesses.  The pt was also 
instructed to have yearly labs for check of cholesterol, thyroid, chem panel, 
CBC, and renal functioning.



Post-surgical menopause - I have advised the pt that we will look for her 
medications from [facility] to find out what her depo dose is so I can wean her off of 
it.



Anxiety - the patient has uncontrolled anxiety and will benefit from an SSRI on 
a daily basis to attempt control of the symptoms of anxiety (tachycardia, 
overwhelming sensations, stress, insomnia, etc). 

stop clonidine due to hypotension       08/15/2019







Medical Equipment

No Medical Equipment data



Health Concerns Section

Health Concerns data not found



Goals Section

Goals data not found



Interventions Section

Interventions data not found



Health Status Evaluations/Outcomes Section

Health Status Evaluations/Outcomes data not found



Advance Directives

No Advance Directive data

## 2023-02-21 ENCOUNTER — HOSPITAL ENCOUNTER (EMERGENCY)
Dept: HOSPITAL 75 - ER | Age: 53
Discharge: HOME | End: 2023-02-21
Payer: COMMERCIAL

## 2023-02-21 VITALS — WEIGHT: 160.06 LBS | BODY MASS INDEX: 25.72 KG/M2 | HEIGHT: 66.02 IN

## 2023-02-21 VITALS — DIASTOLIC BLOOD PRESSURE: 73 MMHG | SYSTOLIC BLOOD PRESSURE: 143 MMHG

## 2023-02-21 DIAGNOSIS — F17.290: ICD-10-CM

## 2023-02-21 DIAGNOSIS — Z90.49: ICD-10-CM

## 2023-02-21 DIAGNOSIS — R10.11: ICD-10-CM

## 2023-02-21 DIAGNOSIS — R10.13: Primary | ICD-10-CM

## 2023-02-21 DIAGNOSIS — Z87.19: ICD-10-CM

## 2023-02-21 DIAGNOSIS — U07.1: ICD-10-CM

## 2023-02-21 LAB
ALBUMIN SERPL-MCNC: 4.1 GM/DL (ref 3.2–4.5)
ALP SERPL-CCNC: 108 U/L (ref 40–136)
ALT SERPL-CCNC: 42 U/L (ref 0–55)
APTT PPP: YELLOW S
BACTERIA #/AREA URNS HPF: (no result) /HPF
BASOPHILS # BLD AUTO: 0 10^3/UL (ref 0–0.1)
BASOPHILS NFR BLD AUTO: 1 % (ref 0–10)
BILIRUB SERPL-MCNC: 0.2 MG/DL (ref 0.1–1)
BILIRUB UR QL STRIP: NEGATIVE
BUN/CREAT SERPL: 17
CALCIUM SERPL-MCNC: 8.7 MG/DL (ref 8.5–10.1)
CHLORIDE SERPL-SCNC: 107 MMOL/L (ref 98–107)
CO2 SERPL-SCNC: 23 MMOL/L (ref 21–32)
CREAT SERPL-MCNC: 0.71 MG/DL (ref 0.6–1.3)
EOSINOPHIL # BLD AUTO: 0.5 10^3/UL (ref 0–0.3)
EOSINOPHIL NFR BLD AUTO: 12 % (ref 0–10)
FIBRINOGEN PPP-MCNC: CLEAR MG/DL
GFR SERPLBLD BASED ON 1.73 SQ M-ARVRAT: 101 ML/MIN
GLUCOSE SERPL-MCNC: 90 MG/DL (ref 70–105)
GLUCOSE UR STRIP-MCNC: NEGATIVE MG/DL
HCT VFR BLD CALC: 39 % (ref 35–52)
HGB BLD-MCNC: 12.9 G/DL (ref 11.5–16)
KETONES UR QL STRIP: NEGATIVE
LEUKOCYTE ESTERASE UR QL STRIP: NEGATIVE
LIPASE SERPL-CCNC: 33 U/L (ref 8–78)
LYMPHOCYTES # BLD AUTO: 1.4 10^3/UL (ref 1–4)
LYMPHOCYTES NFR BLD AUTO: 32 % (ref 12–44)
MANUAL DIFFERENTIAL PERFORMED BLD QL: NO
MCH RBC QN AUTO: 30 PG (ref 25–34)
MCHC RBC AUTO-ENTMCNC: 33 G/DL (ref 32–36)
MCV RBC AUTO: 90 FL (ref 80–99)
MONOCYTES # BLD AUTO: 0.5 10^3/UL (ref 0–1)
MONOCYTES NFR BLD AUTO: 10 % (ref 0–12)
NEUTROPHILS # BLD AUTO: 2.1 10^3/UL (ref 1.8–7.8)
NEUTROPHILS NFR BLD AUTO: 46 % (ref 42–75)
NITRITE UR QL STRIP: NEGATIVE
PH UR STRIP: 5.5 [PH] (ref 5–9)
PLATELET # BLD: 188 10^3/UL (ref 130–400)
PMV BLD AUTO: 10.3 FL (ref 9–12.2)
POTASSIUM SERPL-SCNC: 4.1 MMOL/L (ref 3.6–5)
PROT SERPL-MCNC: 6.9 GM/DL (ref 6.4–8.2)
PROT UR QL STRIP: NEGATIVE
RBC #/AREA URNS HPF: (no result) /HPF
SODIUM SERPL-SCNC: 141 MMOL/L (ref 135–145)
SP GR UR STRIP: >=1.03 (ref 1.02–1.02)
WBC # BLD AUTO: 4.5 10^3/UL (ref 4.3–11)
WBC #/AREA URNS HPF: (no result) /HPF

## 2023-02-21 PROCEDURE — 76705 ECHO EXAM OF ABDOMEN: CPT

## 2023-02-21 PROCEDURE — 83690 ASSAY OF LIPASE: CPT

## 2023-02-21 PROCEDURE — 80053 COMPREHEN METABOLIC PANEL: CPT

## 2023-02-21 PROCEDURE — 81000 URINALYSIS NONAUTO W/SCOPE: CPT

## 2023-02-21 PROCEDURE — 87636 SARSCOV2 & INF A&B AMP PRB: CPT

## 2023-02-21 PROCEDURE — 36415 COLL VENOUS BLD VENIPUNCTURE: CPT

## 2023-02-21 PROCEDURE — 85025 COMPLETE CBC W/AUTO DIFF WBC: CPT

## 2023-02-21 NOTE — ED ABDOMINAL PAIN
General


Chief Complaint:  Abdominal/GI Problems


Stated Complaint:  ABD PAIN


Nursing Triage Note:  


C/O ABDOMINAL PAIN FOR A FEW YEARS HOWEVER WITH IN THE LAST FEW MONTHS HAVING 


DIFFICULTY AND IS SCHEDULED TO SEE TONE FOR ASSESSMENT. ON FRIDAY STOMACH PAIN




STARTED TO GET WORSE.


Source of Information:  Patient


Exam Limitations:  No Limitations


 (FREDA RAMOS)





History of Present Illness


Date Seen by Provider:  Feb 21, 2023


Time Seen by Provider:  11:07


Initial Comments


Patient is a 52-year-old female with a history of total hysterectomy, 

appendectomy who presents ED with epigastric, right upper quadrant abdominal 

pain.  She describes it as pressure rates 8 out of 10.  Pain has been 

intermittent over the past few years.  Worsening pain over the weekend.  Seems 

to be worse with eating or when she lies down.  She reports a radiating pain to 

her mid back described as pressure.  No vomiting or diarrhea.  She has a history

of constipation and frequently has a bowel movement every 1-1/2-week.  She did 

take some Ex-Lax yesterday and did have a bowel movement that was soft.  Denies 

any blood in her stool.  Excessive NSAID use or alcohol use.  She states she has

had a upper EGD done in the past by Dr. Wayne which was unremarkable.  Schedule

follow-up with Dr. Wayne on Monday.  No urinary symptoms, fever, chest pain, 

cough or shortness of breath, headache or dizziness.  She does report some 

burning sensation to her chest certain movements when she lies down makes the 

pain worse


 (FREDA RAMOS)





Allergies and Home Medications


Allergies


Coded Allergies:  


     Penicillins (Verified  Allergy, Unknown, FROM CHILDHOOD, 2/21/23)





Patient Home Medication List


Home Medication List Reviewed:  Yes


 (FREDA RAMOS)


Acyclovir (Acyclovir) 400 Mg Tablet, 400 MG PO DAILY PRN for fever blisters, 

(Reported)


   Entered as Reported by: LETTY BURKS on 5/6/20 0852


Albuterol Sulfate (Ventolin Hfa) 1 Puff Puff, 2 PUFF INH Q4H PRN for WHEEZING, 

(Reported)


   Entered as Reported by: LETTY BURKS on 12/21/21 1147


Atorvastatin Calcium (Atorvastatin Calcium) 20 Mg Tablet, 20 MG PO HS, 

(Reported)


   Entered as Reported by: LETTY BURKS on 12/21/21 1147


Escitalopram Oxalate (Escitalopram Oxalate) 10 Mg Tablet, 10 MG PO DAILY, 

(Reported)


   Entered as Reported by: LETTY BURKS on 12/21/21 1147


Fexofenadine HCl (Allegra Allergy) 180 Mg Tablet, 180 MG PO DAILY, (Reported)


   Entered as Reported by: LETTY BURKS on 12/21/21 1147


Pantoprazole Sodium (Protonix) 40 Mg Tablet.dr, 40 MG PO DAILY


   Prescribed by: KIM WAYNE on 12/28/21 1328


Pantoprazole Sodium (Protonix) 40 Mg Tablet.dr, 40 MG PO DAILY


   Prescribed by: NAN PADILLA on 2/21/23 1335





Review of Systems


Review of Systems


Constitutional:  No chills, No diaphoresis, No malaise, No weakness


EENTM:  No Eye Pain, No Ear Pain, No Mouth Pain


Respiratory:  Denies Cough


Cardiovascular:  Denies Chest Pain


Gastrointestinal:  Abdominal Pain, Constipated; Denies Diarrhea, Denies Nausea, 

Denies Vomiting


Genitourinary:  Denies Burning, Denies Discharge


Musculoskeletal:  No back pain, No joint pain


Skin:  No change in color, No change in hair/nails (FREDA RAMOS)





All Other Systems Reviewed


Negative Unless Noted:  Yes


 (FREDA RAMOS)





Past Medical-Social-Family Hx


Patient Social History


Tobacco Use?:  No


Use of E-Cig and/or Vaping dev:  Yes


E-Cig or Vaping type used:  Nicotine


Use of E-Cig and/or Vaping Christian:  Light User


Substance use?:  No


Alcohol Use?:  Yes


Alcohol type:  Hard Liquor


Alcohol Frequency:  Once in a while


Pt feels they are or have been:  No


 (FREDA RAMOS)





Immunizations Up To Date


Tetanus Booster (TDap):  Unknown


Influenza Vaccine Up-to-Date:  No; Not Current


First/Initial COVID19 Vaccinat:  yes


Second COVID19 Vaccination Ez:  yes


 (FREDA RAMOS)





Seasonal Allergies


Seasonal Allergies:  Yes


 (FREDA RAMOS)





Past Medical History


Surgery/Hospitalization HX:  


SEASONAL ALLERGIES, ASTHMA, ANXIETY





HYSTERECTOMY REMOVAL OF OVARIES, RIGHT SHOULDER AND LEFT KNEE SCOPE, APPY


Surgeries:  Yes (SHOULDER scope, knee scope)


Appendectomy, Hysterectomy, Tonsillectomy


Respiratory:  Yes


Asthma


Cardiac:  No


Neurological:  No


GYN History:  Hysterectomy


Sexually Transmitted Disease:  No


HIV/AIDS:  No


Genitourinary:  No


Gastrointestinal:  Yes (dysphagia)


Gastroesophageal Reflux, Polyps


Musculoskeletal:  No


Endocrine:  No


HEENT:  Yes (GLASSES)


Loss of Vision:  Denies


Hearing Impairment:  Denies


Cancer:  No


Psychosocial:  Yes


Anxiety


Integumentary:  No


Blood Disorders:  Yes (HX ANEMIA )


Adverse Reaction/Blood Tranf:  No (N/A)


 (FREDA RAMOS)





Physical Exam


Vital Signs





Vital Signs - First Documented








 2/21/23





 10:38


 


Temp 37.0


 


Pulse 69


 


Resp 18


 


B/P (MAP) 143/78 (99)


 


Pulse Ox 98


 


O2 Delivery Room Air








 (JENNIFER RAYMOND MD)


Vital Signs


Capillary Refill : Less Than 3 Seconds 


 (FREDA RAMOS)


Height/Weight/BMI


Height: 5'2.00"


Weight: 156lbs. 0.0oz. [id number]; 25.00 BMI


Method:Stated


General Appearance:  WD/WN, no apparent distress


HEENT:  PERRL/EOMI, normal ENT inspection, TMs normal, pharynx normal


Neck:  non-tender, full range of motion, supple, normal inspection


Respiratory:  chest non-tender, lungs clear, normal breath sounds, no 

respiratory distress


Cardiovascular:  regular rate, rhythm, no edema, no gallop, no JVD


Gastrointestinal:  normal bowel sounds, soft, tenderness (Epigastric tenderness,

right upper quadrant tenderness.  Normal bowel sounds throughout.)


Extremities:  normal range of motion, non-tender, normal inspection, no pedal 

edema, no calf tenderness


Back:  normal inspection, no CVA tenderness


Neurologic/Psychiatric:  CNs II-XII nml as tested, no motor/sensory deficits, 

alert, normal mood/affect, oriented x 3


Skin:  normal color, warm/dry (FREDA RAMOS)





Progress/Results/Core Measures


Results/Orders


Lab Results





Laboratory Tests








Test


 2/21/23


10:35 2/21/23


10:49 2/21/23


11:00 Range/Units


 


 


Urine Color YELLOW     


 


Urine Clarity CLEAR     


 


Urine pH 5.5    5-9  


 


Urine Specific Gravity >=1.030    1.016-1.022  


 


Urine Protein NEGATIVE    NEGATIVE  


 


Urine Glucose (UA) NEGATIVE    NEGATIVE  


 


Urine Ketones NEGATIVE    NEGATIVE  


 


Urine Nitrite NEGATIVE    NEGATIVE  


 


Urine Bilirubin NEGATIVE    NEGATIVE  


 


Urine Urobilinogen 0.2    < = 1.0  MG/DL


 


Urine Leukocyte Esterase NEGATIVE    NEGATIVE  


 


Urine RBC (Auto) NEGATIVE    NEGATIVE  


 


Urine RBC NONE     /HPF


 


Urine WBC NONE     /HPF


 


Urine Squamous Epithelial


Cells 10-25 H


 


 


  /HPF





 


Urine Crystals NONE     /LPF


 


Urine Bacteria TRACE     /HPF


 


Urine Casts NONE     /LPF


 


Urine Mucus MODERATE H    /LPF


 


Urine Culture Indicated NO     


 


Influenza Type A (RT-PCR)  Not Detected   Not Detecte  


 


Influenza Type B (RT-PCR)  Not Detected   Not Detecte  


 


SARS-CoV-2 RNA (RT-PCR)  Detected H  Not Detecte  


 


White Blood Count


 


 


 4.5 


 4.3-11.0


10^3/uL


 


Red Blood Count


 


 


 4.28 


 3.80-5.11


10^6/uL


 


Hemoglobin   12.9  11.5-16.0  g/dL


 


Hematocrit   39  35-52  %


 


Mean Corpuscular Volume   90  80-99  fL


 


Mean Corpuscular Hemoglobin   30  25-34  pg


 


Mean Corpuscular Hemoglobin


Concent 


 


 33 


 32-36  g/dL





 


Red Cell Distribution Width   13.3  10.0-14.5  %


 


Platelet Count


 


 


 188 


 130-400


10^3/uL


 


Mean Platelet Volume   10.3  9.0-12.2  fL


 


Immature Granulocyte % (Auto)   0   %


 


Neutrophils (%) (Auto)   46  42-75  %


 


Lymphocytes (%) (Auto)   32  12-44  %


 


Monocytes (%) (Auto)   10  0-12  %


 


Eosinophils (%) (Auto)   12 H 0-10  %


 


Basophils (%) (Auto)   1  0-10  %


 


Neutrophils # (Auto)


 


 


 2.1 


 1.8-7.8


10^3/uL


 


Lymphocytes # (Auto)


 


 


 1.4 


 1.0-4.0


10^3/uL


 


Monocytes # (Auto)


 


 


 0.5 


 0.0-1.0


10^3/uL


 


Eosinophils # (Auto)


 


 


 0.5 H


 0.0-0.3


10^3/uL


 


Basophils # (Auto)


 


 


 0.0 


 0.0-0.1


10^3/uL


 


Immature Granulocyte # (Auto)


 


 


 0.0 


 0.0-0.1


10^3/uL


 


Sodium Level   141  135-145  MMOL/L


 


Potassium Level   4.1  3.6-5.0  MMOL/L


 


Chloride Level   107    MMOL/L


 


Carbon Dioxide Level   23  21-32  MMOL/L


 


Anion Gap   11  5-14  MMOL/L


 


Blood Urea Nitrogen   12  7-18  MG/DL


 


Creatinine


 


 


 0.71 


 0.60-1.30


MG/DL


 


Estimat Glomerular Filtration


Rate 


 


 101 


  





 


BUN/Creatinine Ratio   17   


 


Glucose Level   90    MG/DL


 


Calcium Level   8.7  8.5-10.1  MG/DL


 


Corrected Calcium   8.6  8.5-10.1  MG/DL


 


Total Bilirubin   0.2  0.1-1.0  MG/DL


 


Aspartate Amino Transf


(AST/SGOT) 


 


 28 


 5-34  U/L





 


Alanine Aminotransferase


(ALT/SGPT) 


 


 42 


 0-55  U/L





 


Alkaline Phosphatase   108    U/L


 


Total Protein   6.9  6.4-8.2  GM/DL


 


Albumin   4.1  3.2-4.5  GM/DL


 


Lipase   33  8-78  U/L





 (JENNIFER RAYMOND MD)


Medications Given in ED





Current Medications








 Medications  Dose


 Ordered  Sig/Roscoe


 Route  Start Time


 Stop Time Status Last Admin


Dose Admin


 


 Al Hydrox/Mg


 Hydrox/Simethicone  30 ml  ONCE  ONCE


 PO  2/21/23 13:15


 2/21/23 13:16 DC 2/21/23 13:08


30 ML


 


 Famotidine  20 mg  ONCE  ONCE


 PO  2/21/23 11:15


 2/21/23 11:16 DC 2/21/23 11:12


20 MG


 


 Lidocaine HCl  15 ml  ONCE  ONCE


 PO  2/21/23 13:15


 2/21/23 13:16 DC 2/21/23 13:08


15 ML





 (JENNIFER RAYMOND MD)


Vital Signs/I&O











 2/21/23 2/21/23 2/21/23





 10:38 10:38 13:45


 


Temp  37.0 


 


Pulse  69 58


 


Resp  18 18


 


B/P (MAP)  143/78 (99) 143/73


 


Pulse Ox  98 100


 


O2 Delivery Room Air Room Air Room Air





 (JENNIFER RAYMOND MD)








Blood Pressure Mean:                    99











Departure


Communication (PCP)


Patient presents ED with upper abdominal pain.  History of similar pain but 

worse over the weekend.  No vomiting or diarrhea.  History of constipation.  On 

exam she has right upper quadrant epigastric tenderness with radiation to mid 

back.  Vital signs stable.  Lab work was reassuring.  Due to the right upper 

quadrant pain ultrasound was ordered.  Ultrasound shows gallstones without 

evidence of acute cholecystitis.  She was given Pepcid with minimal improvement.

 GI cocktail with resolution of pain and back pain.  No evidence of surgical 

abdomen.  History of hysterectomy and appendectomy.  She has no chest pain, or 

shortness of breath.  She does report some flulike symptoms since last Thursday.

 She tested positive for COVID.  She states the coughing is improving.  No 

evidence of pneumonia while auscultating her lung sounds.  She is at her last 

day of isolation.  Continue wearing a mask for additional 5 days.  May return to

work after 5 days of isolation and asymptomatic.  She scheduled follow-up with 

Dr. Wayne on Monday.  Discussed diet changes.  Avoid eating late at night 

elevate head at night.  I Believe this is secondary to acid reflex.  Return 

precaution were discussed with patient.  We will start patient on Protonix.


 (FREDA RAMOS)





Impression





   Primary Impression:  


   Abdominal pain


Disposition:  01 HOME, SELF-CARE


Condition:  Stable





Departure-Patient Inst.


Decision time for Depature:  13:35


 (FREDA RAMOS)


Referrals:  


IAIN RATLIFF MD (PCP/Family)


Primary Care Physician








KIM WAYNE DO


Patient Instructions:  Abdominal Pain, Adult ED





Add. Discharge Instructions:  


If no improvement with the Protonix may consider Tums, Mylanta.  Follow-up with 

Dr. Wayne outpatient.  Watch diet





All discharge instructions reviewed with patient and/or family. Voiced 

understanding.


Scripts


Pantoprazole Sodium (Protonix) 40 Mg Tablet.


40 MG PO DAILY, #14 TAB


   Prov: FREDA RAMOS         2/21/23








ATTENDING PHYSICIAN NOTE:


I was physically present as attending physician in the emergency department 

during the care of this patient, but I was not directly involved in the decision

making or delivery of care for this patient. 


 (JENNIFER RAYMOND MD)











FREDA RAMOS          Feb 21, 2023 11:10


JENNIFER RAYMOND MD        Feb 21, 2023 19:36

## 2023-02-21 NOTE — DIAGNOSTIC IMAGING REPORT
PROCEDURE: US Gallbladder.



TECHNIQUE: Multiple real-time grayscale images were obtained over

the right upper quadrant in various projections.



INDICATION:  Right upper quadrant pain.



COMPARISON: None available.



FINDINGS:

The liver is normal in size and echogenicity. There is no focal

hepatic mass. The main portal vein is patent with antegrade flow.





There are 2 nonmobile isoechoic nodules within the gallbladder

lumen that could represent adherent stones versus small polyps.

The largest measures 0.6 cm. The common bile duct measures up to

0.5 cm in diameter. No intrahepatic biliary dilation. 



The visualized portions of the pancreas are normal. Portions of

the head and tail are obscured by overlying bowel gas. 



The right kidney is normal in size. No hydronephrosis, shadowing

calculi, or suspicious mass lesion. 



IMPRESSION: 

1. There are either 2 small adherent gallstones versus polyps

within the gallbladder lumen. No features of acute cholecystitis.

Consider followup right upper quadrant ultrasound 6 months to

ensure stability.

2. No biliary obstruction.



Dictated by: 



  Dictated on workstation # [id number]

## 2023-03-01 ENCOUNTER — HOSPITAL ENCOUNTER (OUTPATIENT)
Dept: HOSPITAL 75 - PREOP | Age: 53
LOS: 1 days | Discharge: HOME | End: 2023-03-02
Attending: SURGERY
Payer: COMMERCIAL

## 2023-03-01 VITALS — BODY MASS INDEX: 28.04 KG/M2 | HEIGHT: 62.01 IN | WEIGHT: 154.32 LBS

## 2023-03-01 DIAGNOSIS — Z01.818: Primary | ICD-10-CM

## 2023-03-08 ENCOUNTER — HOSPITAL ENCOUNTER (OUTPATIENT)
Dept: HOSPITAL 75 - SDC | Age: 53
Discharge: HOME | End: 2023-03-08
Attending: SURGERY
Payer: COMMERCIAL

## 2023-03-08 VITALS — DIASTOLIC BLOOD PRESSURE: 81 MMHG | SYSTOLIC BLOOD PRESSURE: 169 MMHG

## 2023-03-08 VITALS — SYSTOLIC BLOOD PRESSURE: 158 MMHG | DIASTOLIC BLOOD PRESSURE: 91 MMHG

## 2023-03-08 VITALS — DIASTOLIC BLOOD PRESSURE: 74 MMHG | SYSTOLIC BLOOD PRESSURE: 146 MMHG

## 2023-03-08 VITALS — DIASTOLIC BLOOD PRESSURE: 76 MMHG | SYSTOLIC BLOOD PRESSURE: 155 MMHG

## 2023-03-08 VITALS — SYSTOLIC BLOOD PRESSURE: 146 MMHG | DIASTOLIC BLOOD PRESSURE: 80 MMHG

## 2023-03-08 VITALS — SYSTOLIC BLOOD PRESSURE: 165 MMHG | DIASTOLIC BLOOD PRESSURE: 78 MMHG

## 2023-03-08 VITALS — HEIGHT: 61.81 IN | BODY MASS INDEX: 28.4 KG/M2 | WEIGHT: 154.32 LBS

## 2023-03-08 VITALS — DIASTOLIC BLOOD PRESSURE: 81 MMHG | SYSTOLIC BLOOD PRESSURE: 166 MMHG

## 2023-03-08 VITALS — DIASTOLIC BLOOD PRESSURE: 85 MMHG | SYSTOLIC BLOOD PRESSURE: 163 MMHG

## 2023-03-08 VITALS — DIASTOLIC BLOOD PRESSURE: 80 MMHG | SYSTOLIC BLOOD PRESSURE: 159 MMHG

## 2023-03-08 VITALS — SYSTOLIC BLOOD PRESSURE: 160 MMHG | DIASTOLIC BLOOD PRESSURE: 73 MMHG

## 2023-03-08 VITALS — DIASTOLIC BLOOD PRESSURE: 68 MMHG | SYSTOLIC BLOOD PRESSURE: 116 MMHG

## 2023-03-08 DIAGNOSIS — K80.10: Primary | ICD-10-CM

## 2023-03-08 DIAGNOSIS — Z79.899: ICD-10-CM

## 2023-03-08 DIAGNOSIS — K21.9: ICD-10-CM

## 2023-03-08 DIAGNOSIS — F17.290: ICD-10-CM

## 2023-03-08 PROCEDURE — 76000 FLUOROSCOPY <1 HR PHYS/QHP: CPT

## 2023-03-08 PROCEDURE — 87081 CULTURE SCREEN ONLY: CPT

## 2023-03-08 RX ADMIN — SODIUM CHLORIDE, SODIUM LACTATE, POTASSIUM CHLORIDE, AND CALCIUM CHLORIDE PRN MLS/HR: 600; 310; 30; 20 INJECTION, SOLUTION INTRAVENOUS at 09:00

## 2023-03-08 RX ADMIN — SODIUM CHLORIDE, SODIUM LACTATE, POTASSIUM CHLORIDE, AND CALCIUM CHLORIDE PRN MLS/HR: 600; 310; 30; 20 INJECTION, SOLUTION INTRAVENOUS at 11:47

## 2023-03-08 NOTE — PROGRESS NOTE-POST OPERATIVE
Post-Operative Progess Note


Surgeon (s)/Assistant (s)


Surgeon


KIM WAYNE DO


Assistant:  Dr. Solares to assist in retraction dissection and closure.





Pre-Operative Diagnosis


symptomatic cholelithiasis





Post-Operative Diagnosis





same





Procedure & Operative Findings


Date of Procedure


3/8/23


Procedure Performed/Findings


  


PROCEDURE:


Laparoscopic cholecystectomy with intraoperative


cholangiogram. 


 


COMPLICATIONS:


None. 


 


PROCEDURE:


The patient was taken to the operating suite and was prepped and


draped in sterile fashion. A surgical pause was performed. Just


superior to the umbilicus, a 12 mm incision was made. Dissection


was taken down to the fascia, which was then scored and grasped


with a Kocher and the abdomen was then entered.  A 0 Vicryl


suture was placed in a figure-of-eight fashion and a Garcia


trocar was placed and secured. Pneumoperitoneum was achieved.


A 5mm trochar place in the subxyphoid and 2 in the right upper quadrant.


Abdomen inspected and noted multiple intrabdominal adhesions pelvis and right 

gutter.


The gallbladder was then grasped and elevated. The


cystic duct, and cystic artery were then 


dissected out. Clip was placed on the distal


portion of the cystic duct which was then partially transected.


An arrow catheter was inserted into the duct. 


The cholangiogram was then performed. No filing defects and contrast


made its way into the duodenum.  Catheter removed. Clips were placed


on proximal portion of the cystic duct and then the duct was then


transected. Clips were placed along the proximal and distal


portion of the cystic artery which was then transected. Hook


cautery was used to dissect the gallbladder from the gallbladder


fossa achieving hemostasis. The gallbladder was placed in an


Endobag and removed through the 12 mm trocar site. The abdomen


was then reinspected.  Copious amounts of irrigation were used to


irrigate the abdomen and there were no signs of active bleeding.


Hemostasis had been achieved. The 12 mm fascial defect was then


closed with 0 Vicryl suture that had been placed in a


figure-of-eight fashion. The abdomen was then desufflated, the


trocars were removed. The abdomen was then washed and dried. The


skin was then closed using 4-0 Monocryl in a subcuticular fashion.


The abdomen was washed and dried and Skin Affix was place over incisions.


Patient tolerated the procedure well without any complications 


and was taken to the recovery room in stable condition.


Anesthesia Type


general





Estimated Blood Loss


Estimated blood loss (mL):  minimal





Specimens/Packing


Specimens Removed


gallbladder











KIM WAYNE DO               Mar 8, 2023 11:58

## 2023-03-08 NOTE — DIAGNOSTIC IMAGING REPORT
INDICATION: Cholecystectomy.



EXAMINATION: Operative cholangiogram performed. Single view

obtained. 2.8 seconds of fluoroscopy time was used.



FINDINGS: Single fluoroscopic view demonstrates nondilated

biliary tree with no overt filling defect. Contrast is seen

passing to the duodenum.



IMPRESSION: Unremarkable Limited operative cholangiogram. 



Dictated by: 



  Dictated on workstation # [id number]

## 2023-03-08 NOTE — DISCHARGE INST-SIMPLE/STANDARD
Discharge Inst-Standard


Discharge Medications


New, Converted or Re-Newed RX:  Transmitted to Pharmacy





Patient Instructions/Follow Up


Plan of Care/Instructions/FU:  


2 weeks Cory


Activity as Tolerated:  Yes


Discharge Diet:  Regular Diet


Other Inst to Patient


Follow up Appt:


Make appointment for 2 weeks.





Instructions:


No lifting greater than 10 pounds.


No strenuous activity. 


May shower in 24 hours, no tub bath or soaking.


Use incentive spirometer at home as directed.


No Smoking





Skin/Wound Care:


You have special glue over incision, it will fall off on it's own.





Symptoms to Report:


Appetite Changes, Extremity Discoloration, Numbness/Tingling, Swelling 

Increased, Bleeding Excessive, Eyesight Changes, Pain Increased, Urine Color 

Change, Constipation(Persistent), Fever over 101 degree F, Pain/Pressure in 

chest, Urinating Difficulty, Cough Up/Vomit Blood, Heart Beat Irreg/Pounding, 

Pain/Pressure in jaw, Vaginal Bleeding Increase, Cramps in feet or legs, 

Lightheadedness, Pain/Pressure in shoulder, Diarrhea(Persistent), Memory Changes

Suddenly, Questions/Concerns, Weight gain consecutive days, Dizziness/Fainting, 

Nausea/Vomiting, Shortness of Breath, Weight gain over 2 pounds.





If eyes or skin turn yellow notify physician.








If questions or concerns contact your physician 


Or seek help at emergency department.











KIM WAYNE DO               Mar 8, 2023 12:02

## 2023-03-08 NOTE — PROGRESS NOTE-PRE OPERATIVE
Pre-Operative Progress Note


Date H&P Reviewed:  Mar 8, 2023


Time H&P Reviewed:  08:47


History & Physical:  H&P Reviewed, Patient Examed, No changes noted


Pre-Operative Diagnosis:  symptomatic cholelithiasis











KIM WAYNE DO               Mar 8, 2023 08:47